# Patient Record
Sex: FEMALE | Race: WHITE | NOT HISPANIC OR LATINO | Employment: PART TIME | URBAN - NONMETROPOLITAN AREA
[De-identification: names, ages, dates, MRNs, and addresses within clinical notes are randomized per-mention and may not be internally consistent; named-entity substitution may affect disease eponyms.]

---

## 2017-03-03 ENCOUNTER — OFFICE VISIT (OUTPATIENT)
Dept: FAMILY MEDICINE CLINIC | Facility: CLINIC | Age: 37
End: 2017-03-03

## 2017-03-03 VITALS
WEIGHT: 163 LBS | SYSTOLIC BLOOD PRESSURE: 121 MMHG | TEMPERATURE: 98.5 F | DIASTOLIC BLOOD PRESSURE: 70 MMHG | BODY MASS INDEX: 26.2 KG/M2 | OXYGEN SATURATION: 98 % | HEIGHT: 66 IN | HEART RATE: 78 BPM

## 2017-03-03 DIAGNOSIS — F17.200 TOBACCO USE DISORDER: ICD-10-CM

## 2017-03-03 DIAGNOSIS — K58.0 IRRITABLE BOWEL SYNDROME WITH DIARRHEA: ICD-10-CM

## 2017-03-03 DIAGNOSIS — K21.9 GASTROESOPHAGEAL REFLUX DISEASE WITHOUT ESOPHAGITIS: Primary | ICD-10-CM

## 2017-03-03 PROCEDURE — 99214 OFFICE O/P EST MOD 30 MIN: CPT | Performed by: FAMILY MEDICINE

## 2017-03-03 PROCEDURE — 99406 BEHAV CHNG SMOKING 3-10 MIN: CPT | Performed by: FAMILY MEDICINE

## 2017-03-03 RX ORDER — PANTOPRAZOLE SODIUM 40 MG/1
40 TABLET, DELAYED RELEASE ORAL DAILY
Qty: 30 TABLET | Refills: 5 | Status: SHIPPED | OUTPATIENT
Start: 2017-03-03 | End: 2017-04-21 | Stop reason: SDUPTHER

## 2017-03-03 RX ORDER — BUPROPION HYDROCHLORIDE 100 MG/1
100 TABLET, EXTENDED RELEASE ORAL 2 TIMES DAILY
Qty: 60 TABLET | Refills: 3 | Status: SHIPPED | OUTPATIENT
Start: 2017-03-03 | End: 2017-04-21 | Stop reason: SDUPTHER

## 2017-03-03 NOTE — PROGRESS NOTES
Subjective   Vielka Jennings is a 36 y.o. female.     Chief Complaint   Patient presents with   • Follow-up     med refills       History of Present Illness   Pt here for sched f/u of TYRA/irritable bowel syndrome; GERD symptoms well controlled; pt states some inc freq of anxiety spells, situational; no dysphagia; no BRB/BTS; good uop.  The following portions of the patient's history were reviewed and updated as appropriate: allergies, current medications, past family history, past medical history, past social history, past surgical history and problem list.    Review of Systems   Constitutional: Negative for activity change, appetite change, chills, diaphoresis, fatigue, fever and unexpected weight change.   HENT: Negative for congestion, dental problem, drooling, ear discharge, ear pain, facial swelling, hearing loss, mouth sores, nosebleeds, postnasal drip, rhinorrhea, sinus pressure, sneezing, sore throat, tinnitus, trouble swallowing and voice change.    Eyes: Negative for photophobia, pain, discharge, redness, itching and visual disturbance.   Respiratory: Negative for apnea, cough, choking, chest tightness, shortness of breath, wheezing and stridor.    Cardiovascular: Negative for chest pain, palpitations and leg swelling.   Gastrointestinal: Negative for abdominal distention, abdominal pain, anal bleeding, blood in stool, constipation, diarrhea, nausea, rectal pain and vomiting.   Endocrine: Negative for cold intolerance, heat intolerance, polydipsia, polyphagia and polyuria.   Genitourinary: Negative for decreased urine volume, difficulty urinating, dysuria, enuresis, flank pain, frequency, genital sores, hematuria and urgency.   Musculoskeletal: Negative for arthralgias, back pain, gait problem, joint swelling, myalgias, neck pain and neck stiffness.   Skin: Negative for color change, pallor, rash and wound.   Allergic/Immunologic: Negative for food allergies and immunocompromised state.   Neurological:  "Negative for dizziness, tremors, seizures, syncope, facial asymmetry, speech difficulty, weakness, light-headedness, numbness and headaches.   Hematological: Negative for adenopathy. Does not bruise/bleed easily.   Psychiatric/Behavioral: Negative for agitation, behavioral problems, confusion, decreased concentration, dysphoric mood, hallucinations, self-injury, sleep disturbance and suicidal ideas. The patient is nervous/anxious. The patient is not hyperactive.        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     • PROAIR  (90 BASE) MCG/ACT inhaler   0   • [DISCONTINUED] pantoprazole (PROTONIX) 40 MG EC tablet Take 1 tablet by mouth Daily. 30 tablet 5   • [DISCONTINUED] sertraline (ZOLOFT) 50 MG tablet Take 1 tablet by mouth Daily. 30 tablet 5     No current facility-administered medications on file prior to visit.        Social History     Social History   • Marital status:      Spouse name: N/A   • Number of children: N/A   • Years of education: N/A     Occupational History   • Not on file.     Social History Main Topics   • Smoking status: Current Every Day Smoker   • Smokeless tobacco: Not on file   • Alcohol use No   • Drug use: No   • Sexual activity: Defer     Other Topics Concern   • Not on file     Social History Narrative       Objective   Blood pressure 121/70, pulse 78, temperature 98.5 °F (36.9 °C), height 66\" (167.6 cm), weight 163 lb (73.9 kg), SpO2 98 %.     Physical Exam   Constitutional: She is oriented to person, " place, and time. She appears well-developed and well-nourished. No distress.   HENT:   Head: Normocephalic and atraumatic.   Right Ear: External ear normal.   Left Ear: External ear normal.   Nose: Nose normal.   Mouth/Throat: Oropharynx is clear and moist. No oropharyngeal exudate.   Eyes: Conjunctivae and EOM are normal. Pupils are equal, round, and reactive to light. Right eye exhibits no discharge. Left eye exhibits no discharge. No scleral icterus.   Neck: Normal range of motion. Neck supple. No JVD present. No tracheal deviation present. No thyromegaly present.   Cardiovascular: Normal rate, normal heart sounds and intact distal pulses.  Exam reveals no gallop and no friction rub.    No murmur heard.  Pulmonary/Chest: Effort normal and breath sounds normal. No stridor. No respiratory distress. She has no wheezes. She has no rales. She exhibits no tenderness.   Abdominal: Soft. Bowel sounds are normal. She exhibits no distension and no mass. There is no tenderness. There is no rebound and no guarding. No hernia.   Genitourinary:   Genitourinary Comments: Pt defers   Musculoskeletal: Normal range of motion. She exhibits no edema, tenderness or deformity.   Lymphadenopathy:     She has no cervical adenopathy.   Neurological: She is alert and oriented to person, place, and time. She has normal reflexes. She displays normal reflexes. No cranial nerve deficit. She exhibits normal muscle tone. Coordination normal.   Skin: Skin is warm. No rash noted. She is not diaphoretic. No erythema. No pallor.   Chronic beefy red port wine stain to R scalp/ear/neck, involving R EAC, and R oral mucosa/post pharynx but not crossing midline.   Psychiatric: She has a normal mood and affect. Her behavior is normal. Judgment and thought content normal.   Nursing note and vitals reviewed.      Results for orders placed or performed during the hospital encounter of 06/10/16   TSH   Result Value Ref Range    TSH 1.012 0.350 - 5.350 UIU/mL    T4, free   Result Value Ref Range    Free T4 1.31 0.89 - 1.76 ng/dL   Lipoprotein NMR   Result Value Ref Range    Total Cholesterol 175 100 - 199 mg/dL    HDL-C 38 (L) >39 mg/dL    Triglycerides 63 0 - 149 mg/dL    LDL-P 1600 (H) <1000 nmol/L    HDL-P (Total) 30.2 (L) >=30.5 umol/L    Small LDL-P 881 (H) <=527 nmol/L    LDL Size 20.4 >20.5 nm    LP-IR Score** 53 (H) <=45    LDL Cholesterol  124 (H) 0 - 99 mg/dL       Assessment/Plan   Problems Addressed this Visit        Digestive    Gastroesophageal reflux disease without esophagitis - Primary    Relevant Medications    pantoprazole (PROTONIX) 40 MG EC tablet    Irritable bowel syndrome with diarrhea    Relevant Medications    buPROPion SR (WELLBUTRIN SR) 100 MG 12 hr tablet       Other    Tobacco use disorder    Relevant Medications    buPROPion SR (WELLBUTRIN SR) 100 MG 12 hr tablet               Discussion/Summary:Discussed plan of care in detail with pt today; pt verb understanding and agrees; counseled for approx 15 min of total 25 min exam time.    Discussed need for stress/anxiety reducing techniques such as prayer/meditation/breathing and counting exercises and avoidance of stress producing environments/situations; will follow clinically; consider anxiolytic med for prn use; will start with hydroxyzine and follow clinically.    Anti - reflux measures, trigger foods and drinks to avoid: Fatty foods, alcohol, chocolate, coffee, tea, caffeinated soft drinks (decaffeinated coffee still has some caffeine), peppermint and spearmint, spices and vinegar, citrus fruits and juices, tomatoes and tomato sauces, and smoking. Other antireflux measures include weight reduction if overweight, avoid tight clothing around the abdomen, elevate the head of her bed 6 inches (May use a bed wedge which is placed between the mattress in box [place]) or blocks under the head of the bed, don't drink or eat for 2 hours before going to bed and avoid lying down immediately after  meals.    Counseled on numerous health conseq of tob use for a total of 8 minutes of exam time; pt verb understanding and agrees with need for cessation; pt chooses gradual cessation; instructed pt to use meditation/exercise/prayer or any other stress reducing activities to assist; will follow clinically and assist pt as needed; will change mood stabilizer to wellbutrin and follow clinically; stop zoloft.    There are no Patient Instructions on file for this visit.

## 2017-03-13 ENCOUNTER — TELEPHONE (OUTPATIENT)
Dept: OBSTETRICS AND GYNECOLOGY | Facility: CLINIC | Age: 37
End: 2017-03-13

## 2017-03-13 RX ORDER — LEVONORGESTREL AND ETHINYL ESTRADIOL 100-20(84)
1 KIT ORAL DAILY
Qty: 91 TABLET | Refills: 0 | Status: SHIPPED | OUTPATIENT
Start: 2017-03-13 | End: 2017-06-12

## 2017-03-13 NOTE — TELEPHONE ENCOUNTER
----- Message from Bria Shen sent at 3/13/2017 10:27 AM EDT -----  Contact: pt  This is Ora's pt.  She is scheduled for annual in April, but needs 1 refill of LoSeasonique sent to [facility] in [place].  thanks

## 2017-04-21 ENCOUNTER — TELEPHONE (OUTPATIENT)
Dept: FAMILY MEDICINE CLINIC | Facility: CLINIC | Age: 37
End: 2017-04-21

## 2017-04-21 DIAGNOSIS — K58.0 IRRITABLE BOWEL SYNDROME WITH DIARRHEA: ICD-10-CM

## 2017-04-21 DIAGNOSIS — F17.200 TOBACCO USE DISORDER: ICD-10-CM

## 2017-04-21 DIAGNOSIS — K21.9 GASTROESOPHAGEAL REFLUX DISEASE WITHOUT ESOPHAGITIS: ICD-10-CM

## 2017-04-21 RX ORDER — PANTOPRAZOLE SODIUM 40 MG/1
40 TABLET, DELAYED RELEASE ORAL DAILY
Qty: 30 TABLET | Refills: 5 | Status: SHIPPED | OUTPATIENT
Start: 2017-04-21 | End: 2018-02-23 | Stop reason: SDDI

## 2017-04-21 RX ORDER — BUPROPION HYDROCHLORIDE 100 MG/1
100 TABLET, EXTENDED RELEASE ORAL 2 TIMES DAILY
Qty: 60 TABLET | Refills: 3 | Status: SHIPPED | OUTPATIENT
Start: 2017-04-21 | End: 2018-02-23 | Stop reason: SINTOL

## 2017-04-21 NOTE — TELEPHONE ENCOUNTER
"----- Message from Tess Montenegro sent at 4/21/2017  8:58 AM EDT -----  Contact: ROD   Pt came into the office for an appointment with dr melissa. i advised pt that he is no longer in the practice and that we have tried to contact her regarding appt. Pt began to be upset stating \"if we would have called she would have answered.\" i verified phone number with pt which was correct. She stated \" it doesn't matter if she says we didn't call her its only whats documented that is correct\" i advised pt i could try to get her in with another provider if she needed to be seen and that she could have a seat. She then stated again \" if you all had called me i would not have gotten my daughter out since she just had surgery\" then len came up to speak with patient.   "

## 2017-05-25 ENCOUNTER — OFFICE VISIT (OUTPATIENT)
Dept: OBSTETRICS AND GYNECOLOGY | Facility: CLINIC | Age: 37
End: 2017-05-25

## 2017-05-25 VITALS
SYSTOLIC BLOOD PRESSURE: 122 MMHG | BODY MASS INDEX: 26.84 KG/M2 | HEIGHT: 66 IN | WEIGHT: 167 LBS | DIASTOLIC BLOOD PRESSURE: 80 MMHG

## 2017-05-25 DIAGNOSIS — Z12.4 SCREENING FOR MALIGNANT NEOPLASM OF CERVIX: ICD-10-CM

## 2017-05-25 DIAGNOSIS — Z01.419 ENCOUNTER FOR GYNECOLOGICAL EXAMINATION WITHOUT ABNORMAL FINDING: Primary | ICD-10-CM

## 2017-05-25 PROCEDURE — 99395 PREV VISIT EST AGE 18-39: CPT | Performed by: PHYSICIAN ASSISTANT

## 2017-05-25 RX ORDER — LEVONORGESTREL AND ETHINYL ESTRADIOL 100-20(84)
1 KIT ORAL DAILY
Qty: 91 TABLET | Refills: 3 | Status: SHIPPED | OUTPATIENT
Start: 2017-05-25 | End: 2018-02-23 | Stop reason: SDUPTHER

## 2017-06-08 DIAGNOSIS — Z01.419 ENCOUNTER FOR GYNECOLOGICAL EXAMINATION WITHOUT ABNORMAL FINDING: ICD-10-CM

## 2017-12-01 ENCOUNTER — OFFICE VISIT (OUTPATIENT)
Dept: OBSTETRICS AND GYNECOLOGY | Facility: CLINIC | Age: 37
End: 2017-12-01

## 2017-12-01 VITALS
HEIGHT: 66 IN | DIASTOLIC BLOOD PRESSURE: 78 MMHG | SYSTOLIC BLOOD PRESSURE: 126 MMHG | BODY MASS INDEX: 27.97 KG/M2 | WEIGHT: 174 LBS

## 2017-12-01 DIAGNOSIS — R87.610 ATYPICAL SQUAMOUS CELL CHANGES OF UNDETERMINED SIGNIFICANCE (ASCUS) ON CERVICAL CYTOLOGY WITH POSITIVE HIGH RISK HUMAN PAPILLOMA VIRUS (HPV): Primary | ICD-10-CM

## 2017-12-01 DIAGNOSIS — R87.810 ATYPICAL SQUAMOUS CELL CHANGES OF UNDETERMINED SIGNIFICANCE (ASCUS) ON CERVICAL CYTOLOGY WITH POSITIVE HIGH RISK HUMAN PAPILLOMA VIRUS (HPV): Primary | ICD-10-CM

## 2017-12-01 PROCEDURE — 99213 OFFICE O/P EST LOW 20 MIN: CPT | Performed by: PHYSICIAN ASSISTANT

## 2017-12-01 NOTE — PROGRESS NOTES
Subjective   Chief Complaint   Patient presents with   • Abnormal Pap Smear     Pt here for repeat pap due to previous abnormal pap.        Vielka Jennings is a 37 y.o. 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 2016   • Heart murmur    • IBS (irritable bowel syndrome)    • Irritable bowel syndrome with diarrhea 6/3/2016        Current Outpatient Prescriptions:   •  Levonorgest-Eth Estrad 91-Day (CAMRESE LO) 0.1-0.02 & 0.01 MG tablet, Take 1 tablet by mouth Daily., Disp: 91 tablet, Rfl: 3  •  buPROPion SR (WELLBUTRIN SR) 100 MG 12 hr tablet, Take 1 tablet by mouth 2 (Two) Times a Day., Disp: 60 tablet, Rfl: 3  •  pantoprazole (PROTONIX) 40 MG EC tablet, Take 1 tablet by mouth Daily., Disp: 30 tablet, Rfl: 5  •  PROAIR  (90 BASE) MCG/ACT inhaler, , Disp: , Rfl: 0   Allergies   Allergen Reactions   • Levaquin [Levofloxacin]    • Sulfa Antibiotics    • Zithromax [Azithromycin]       Past Surgical History:   Procedure Laterality Date   • ANKLE SURGERY     • CHOLECYSTECTOMY     • DILATATION AND CURETTAGE        Social History     Social History   • Marital status:      Spouse name: N/A   • Number of children: N/A   • Years of education: N/A     Occupational History   • Not on file.     Social History Main Topics   • Smoking status: Current Every Day Smoker   • Smokeless tobacco: Never Used   • Alcohol use No   • Drug use: No   • Sexual activity: Defer     Other Topics Concern   • Not on file     Social History Narrative      Family History   Problem Relation Age of Onset   • Cancer Mother    • Hyperlipidemia Mother    • Melanoma Mother    • Hyperlipidemia Father    • Hypertension Father    •  "Hyperlipidemia Brother    • Hypertension Brother        Review of Systems        Objective   /78  Ht 66\" (167.6 cm)  Wt 174 lb (78.9 kg)  BMI 28.08 kg/m2    Physical Exam   Constitutional: She appears well-developed and well-nourished. She is cooperative.   Abdominal: Soft. Normal appearance. There is no tenderness.   Genitourinary: Vagina normal and uterus normal. There is no tenderness or lesion on the right labia. There is no tenderness or lesion on the left labia. Cervix exhibits no motion tenderness and no discharge. Right adnexum displays no mass and no tenderness. Left adnexum displays no mass and no tenderness.   Neurological: She is alert.   Skin: Skin is warm and dry.   Psychiatric: She has a normal mood and affect. Her behavior is normal.            Assessment and Plan  Vielka was seen today for abnormal pap smear.    Diagnoses and all orders for this visit:    Atypical squamous cell changes of undetermined significance (ASCUS) on cervical cytology with positive high risk human papilloma virus (HPV)  -     Liquid-based Pap Smear, Screening - ThinPrep Vial, Cervix      Patient Instructions   Will call with pap results             This note was electronically signed.    Sarah Biggs PA-C   December 1, 2017  "

## 2018-02-23 ENCOUNTER — OFFICE VISIT (OUTPATIENT)
Dept: FAMILY MEDICINE CLINIC | Facility: CLINIC | Age: 38
End: 2018-02-23

## 2018-02-23 VITALS
OXYGEN SATURATION: 99 % | BODY MASS INDEX: 27.64 KG/M2 | HEIGHT: 66 IN | HEART RATE: 86 BPM | DIASTOLIC BLOOD PRESSURE: 84 MMHG | TEMPERATURE: 99.2 F | SYSTOLIC BLOOD PRESSURE: 130 MMHG | WEIGHT: 172 LBS

## 2018-02-23 DIAGNOSIS — F17.200 TOBACCO DEPENDENCE: ICD-10-CM

## 2018-02-23 DIAGNOSIS — F41.9 ANXIETY: ICD-10-CM

## 2018-02-23 DIAGNOSIS — Z13.29 THYROID DISORDER SCREEN: ICD-10-CM

## 2018-02-23 DIAGNOSIS — E53.8 VITAMIN B 12 DEFICIENCY: ICD-10-CM

## 2018-02-23 DIAGNOSIS — R07.89 OTHER CHEST PAIN: ICD-10-CM

## 2018-02-23 DIAGNOSIS — R53.83 FATIGUE, UNSPECIFIED TYPE: ICD-10-CM

## 2018-02-23 DIAGNOSIS — H65.31 CHRONIC MUCOID OTITIS MEDIA OF RIGHT EAR: ICD-10-CM

## 2018-02-23 DIAGNOSIS — E55.9 VITAMIN D DEFICIENCY: ICD-10-CM

## 2018-02-23 DIAGNOSIS — H91.91 HEARING LOSS ASSOCIATED WITH SYNDROME OF RIGHT EAR: ICD-10-CM

## 2018-02-23 DIAGNOSIS — R00.2 INTERMITTENT PALPITATIONS: ICD-10-CM

## 2018-02-23 PROCEDURE — 99214 OFFICE O/P EST MOD 30 MIN: CPT | Performed by: NURSE PRACTITIONER

## 2018-02-23 PROCEDURE — 99406 BEHAV CHNG SMOKING 3-10 MIN: CPT | Performed by: NURSE PRACTITIONER

## 2018-02-23 PROCEDURE — 36415 COLL VENOUS BLD VENIPUNCTURE: CPT | Performed by: NURSE PRACTITIONER

## 2018-02-23 RX ORDER — LEVONORGESTREL AND ETHINYL ESTRADIOL 100-20(84)
KIT ORAL
COMMUNITY
End: 2018-05-08 | Stop reason: SDUPTHER

## 2018-02-23 RX ORDER — VENLAFAXINE HYDROCHLORIDE 37.5 MG/1
37.5 CAPSULE, EXTENDED RELEASE ORAL DAILY
Qty: 30 CAPSULE | Refills: 1 | Status: SHIPPED | OUTPATIENT
Start: 2018-02-23 | End: 2018-03-25

## 2018-02-23 RX ORDER — ALPRAZOLAM 0.5 MG/1
0.5 TABLET ORAL NIGHTLY PRN
Qty: 30 TABLET | Refills: 0 | Status: SHIPPED | OUTPATIENT
Start: 2018-02-23 | End: 2018-03-25

## 2018-02-23 NOTE — PROGRESS NOTES
Subjective   Vielka Jennings is a 37 y.o. female.     HPI Comments: Patient is a 37 year old female here today to establish care with a PCP. She has been having chest pain, palpitations and fatigue for the past 2 years. She states she was told 2 years ago, that it was thought to be anxiety causing her symptoms. She states she had an EKG and an ECHO, at [facility] and they were both normal. She states feels as well her symptoms could be anxiety related. She states she does not sleep well either, and feels fatigued a lot, so could be depressed as well. She has had anxiety in the past but has never taken anything for it.    She has history of IBS and GERD, but both are currently controlled with diet and prn meds. She states she has take Zoloft in the past, for her IBS, but it did not help anything so she stopped it.    She states she also has a history of chronic otitis media of her right ear, and hearing loss, which she follows ENT for and recently had a tube placed in the ear. It is felt that it is possible that her Port Wine stain, which extends down into her ear, is the cause of her chronic ear problems.    Social History    Marital status:          Number of children: 1              Occupational History    None on file    Social History Main Topics    Smoking status: Current Every Day Smoker                                                     Packs/day: 0.50      Years: 0.00           Types: Cigarettes    Smokeless status: Never Used                        Alcohol use: No              Drug use: No              Sexual activity: Defer                Other Topics            Concern    None on file    Social History Narrative    None on file    Review of patient's family history indicates:    Cancer                         Mother                    Hyperlipidemia                 Mother                    Melanoma                       Mother                    Hyperlipidemia                 Father                     Hypertension                   Father                    Hyperlipidemia                 Brother                   Hypertension                   Brother                   Thyroid disease                Maternal Aunt             Hyperlipidemia                 Maternal Grandmother      Hyperlipidemia                 Maternal Grandfather      Heart attack                   Paternal Grandmother      Hyperlipidemia                 Paternal Grandmother      Heart attack                   Paternal Grandfather      Hyperlipidemia                 Paternal Grandfather                 The following portions of the patient's history were reviewed and updated as appropriate: allergies, current medications, past family history, past medical history, past social history, past surgical history and problem list.    Review of Systems   Constitutional: Positive for fatigue. Negative for activity change, appetite change, chills, diaphoresis, fever and unexpected weight change.   HENT: Positive for ear pain and hearing loss. Negative for congestion, dental problem, ear discharge, postnasal drip, rhinorrhea, sinus pain, sinus pressure, sneezing, sore throat, tinnitus and trouble swallowing.    Eyes: Negative.    Respiratory: Negative for apnea, cough, chest tightness, shortness of breath and wheezing.    Cardiovascular: Positive for chest pain and palpitations. Negative for leg swelling.   Gastrointestinal: Negative.         Constipation or diarrhea at times, r/t IBS   Endocrine: Negative.    Genitourinary: Negative.    Musculoskeletal: Negative.    Skin: Negative.    Allergic/Immunologic: Negative.    Neurological: Negative.    Hematological: Negative.    Psychiatric/Behavioral: Negative for agitation, behavioral problems, confusion, decreased concentration, dysphoric mood, hallucinations, self-injury, sleep disturbance and suicidal ideas. The patient is nervous/anxious. The patient is not hyperactive.      Blood  "pressure 130/84, pulse 86, temperature 99.2 °F (37.3 °C), height 167.6 cm (66\"), weight 78 kg (172 lb), last menstrual period 01/01/2015, SpO2 99 %.  Objective   Physical Exam   Constitutional: She is oriented to person, place, and time. She appears well-developed and well-nourished. No distress.   HENT:   Head: Normocephalic.   Right Ear: External ear normal.   Left Ear: External ear normal.   Nose: Nose normal.   Mouth/Throat: Oropharynx is clear and moist. No oropharyngeal exudate.   Tympanostomy tube noted in right ear   Eyes: Conjunctivae are normal.   Neck: Normal range of motion. Neck supple. No tracheal deviation present. No thyromegaly present.   Cardiovascular: Normal rate, regular rhythm, normal heart sounds and intact distal pulses.    No murmur heard.  Pulmonary/Chest: Effort normal and breath sounds normal. No respiratory distress. She has no wheezes. She has no rales. She exhibits no tenderness.   Abdominal: Soft. Bowel sounds are normal. She exhibits no distension and no mass. There is no hepatosplenomegaly or splenomegaly. There is no tenderness. There is no rebound and no guarding. No hernia.   Musculoskeletal: Normal range of motion. She exhibits no edema or tenderness.   Lymphadenopathy:     She has no cervical adenopathy.        Right cervical: No superficial cervical, no deep cervical and no posterior cervical adenopathy present.       Left cervical: No superficial cervical, no deep cervical and no posterior cervical adenopathy present.   Neurological: She is alert and oriented to person, place, and time. Coordination and gait normal.   Skin: Skin is warm and dry. No rash noted.        Psychiatric: She has a normal mood and affect. Her behavior is normal. Judgment and thought content normal.   Nursing note and vitals reviewed.      Assessment/Plan   Vielka was seen today for establish care, fatigue and anxiety.    Diagnoses and all orders for this visit:    Other chest pain    Intermittent " palpitations    Fatigue, unspecified type  -     CBC (No Diff)  -     Comprehensive Metabolic Panel  -     TSH  -     T4, Free  -     Vitamin B12    Anxiety  -     venlafaxine XR (EFFEXOR XR) 37.5 MG 24 hr capsule; Take 1 capsule by mouth Daily for 30 days.  -     ALPRAZolam (XANAX) 0.5 MG tablet; Take 1 tablet by mouth At Night As Needed for Anxiety for up to 30 days.  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 Effexor and Xanax prescribed for treatment of her anxiety and accompanying symptoms. Risks, benefits, and potential side effects of current/new medications reviewed with patient.  Patient voiced understanding and wished to proceed with treatment. NADIA report reviewed and scanned into chart.  Last NADIA date 2/23/18.    Screening labs obtained in the clinic today, for further assessment of her symptoms. She does have history of Vitamin D and B 12 deficiency. I will contact patient regarding test results and provide instructions regarding any necessary changes in plan of care.    Patient advised to continue to follow with ENT as directed, for her chronic ear conditions.     Smoking cessation advised.  Pt voiced understanding of health risks of cont' smoking. Patient wishes to continue smoking at this time. 3 minutes spent discussing this.    Patient was encouraged to keep me informed of any acute changes, lack of improvement, or any new concerning symptoms.    Patient to RTC in 4 weeks for follow up.      New Medications Ordered This Visit   Medications   • Levonorgest-Eth Estrad  91-Day (AMETHIA LO) 0.1-0.02 & 0.01 MG tablet     Sig: Take  by mouth.   • venlafaxine XR (EFFEXOR XR) 37.5 MG 24 hr capsule     Sig: Take 1 capsule by mouth Daily for 30 days.     Dispense:  30 capsule     Refill:  1   • ALPRAZolam (XANAX) 0.5 MG tablet     Sig: Take 1 tablet by mouth At Night As Needed for Anxiety for up to 30 days.     Dispense:  30 tablet     Refill:  0

## 2018-02-24 LAB
25(OH)D3+25(OH)D2 SERPL-MCNC: 11.9 NG/ML (ref 30–100)
ALBUMIN SERPL-MCNC: 4.4 G/DL (ref 3.5–5.5)
ALBUMIN/GLOB SERPL: 1.7 {RATIO} (ref 1.2–2.2)
ALP SERPL-CCNC: 80 IU/L (ref 39–117)
ALT SERPL-CCNC: 12 IU/L (ref 0–32)
AST SERPL-CCNC: 14 IU/L (ref 0–40)
BILIRUB SERPL-MCNC: 0.2 MG/DL (ref 0–1.2)
BUN SERPL-MCNC: 9 MG/DL (ref 6–20)
BUN/CREAT SERPL: 12 (ref 9–23)
CALCIUM SERPL-MCNC: 9.7 MG/DL (ref 8.7–10.2)
CHLORIDE SERPL-SCNC: 102 MMOL/L (ref 96–106)
CO2 SERPL-SCNC: 24 MMOL/L (ref 18–29)
CREAT SERPL-MCNC: 0.75 MG/DL (ref 0.57–1)
ERYTHROCYTE [DISTWIDTH] IN BLOOD BY AUTOMATED COUNT: 14 % (ref 12.3–15.4)
GFR SERPLBLD CREATININE-BSD FMLA CKD-EPI: 103 ML/MIN/1.73
GFR SERPLBLD CREATININE-BSD FMLA CKD-EPI: 119 ML/MIN/1.73
GLOBULIN SER CALC-MCNC: 2.6 G/DL (ref 1.5–4.5)
GLUCOSE SERPL-MCNC: 84 MG/DL (ref 65–99)
HCT VFR BLD AUTO: 45.1 % (ref 34–46.6)
HGB BLD-MCNC: 15.4 G/DL (ref 11.1–15.9)
Lab: NORMAL
MCH RBC QN AUTO: 31 PG (ref 26.6–33)
MCHC RBC AUTO-ENTMCNC: 34.1 G/DL (ref 31.5–35.7)
MCV RBC AUTO: 91 FL (ref 79–97)
PLATELET # BLD AUTO: 345 X10E3/UL (ref 150–379)
POTASSIUM SERPL-SCNC: 4.3 MMOL/L (ref 3.5–5.2)
PROT SERPL-MCNC: 7 G/DL (ref 6–8.5)
RBC # BLD AUTO: 4.96 X10E6/UL (ref 3.77–5.28)
SODIUM SERPL-SCNC: 143 MMOL/L (ref 134–144)
T4 FREE SERPL-MCNC: 1.42 NG/DL (ref 0.82–1.77)
TSH SERPL DL<=0.005 MIU/L-ACNC: 2.28 UIU/ML (ref 0.45–4.5)
VIT B12 SERPL-MCNC: 386 PG/ML (ref 232–1245)
WBC # BLD AUTO: 9.3 X10E3/UL (ref 3.4–10.8)

## 2018-02-26 DIAGNOSIS — E55.9 VITAMIN D DEFICIENCY: Primary | ICD-10-CM

## 2018-02-26 RX ORDER — ERGOCALCIFEROL 1.25 MG/1
50000 CAPSULE ORAL WEEKLY
Qty: 8 CAPSULE | Refills: 0 | Status: SHIPPED | OUTPATIENT
Start: 2018-02-26 | End: 2018-04-17

## 2018-04-16 ENCOUNTER — TELEPHONE (OUTPATIENT)
Dept: FAMILY MEDICINE CLINIC | Facility: CLINIC | Age: 38
End: 2018-04-16

## 2018-04-16 NOTE — TELEPHONE ENCOUNTER
Pt had sent a my chart message that she had tried calling several times and could not get a return call and that she wanted her apt canceled.    Message was sent on 4/16. I called pt and apologized several times and stated that I didn't know she had tried to call because I didn't get a message from her on my voice mail and I asked her if there was anything else we could do and she said no that was fine.

## 2018-04-17 DIAGNOSIS — E55.9 VITAMIN D DEFICIENCY: ICD-10-CM

## 2018-04-17 RX ORDER — ERGOCALCIFEROL 1.25 MG/1
CAPSULE ORAL
Qty: 8 CAPSULE | Refills: 0 | OUTPATIENT
Start: 2018-04-17

## 2018-05-08 ENCOUNTER — TELEPHONE (OUTPATIENT)
Dept: OBSTETRICS AND GYNECOLOGY | Facility: CLINIC | Age: 38
End: 2018-05-08

## 2018-05-08 RX ORDER — LEVONORGESTREL AND ETHINYL ESTRADIOL 100-20(84)
1 KIT ORAL DAILY
Qty: 91 TABLET | Refills: 1 | Status: SHIPPED | OUTPATIENT
Start: 2018-05-08 | End: 2018-06-14 | Stop reason: SDUPTHER

## 2018-05-08 NOTE — TELEPHONE ENCOUNTER
----- Message from Filippo Vaca sent at 5/8/2018  8:51 AM EDT -----  Contact: patient  Patient is requesting a refill on her birth control.  She is scheduled 06/14/2018 for her annual.       [facility] [place]

## 2018-06-14 ENCOUNTER — OFFICE VISIT (OUTPATIENT)
Dept: OBSTETRICS AND GYNECOLOGY | Facility: CLINIC | Age: 38
End: 2018-06-14

## 2018-06-14 VITALS
DIASTOLIC BLOOD PRESSURE: 70 MMHG | HEIGHT: 66 IN | WEIGHT: 170.4 LBS | SYSTOLIC BLOOD PRESSURE: 124 MMHG | BODY MASS INDEX: 27.38 KG/M2

## 2018-06-14 DIAGNOSIS — Z87.42 HISTORY OF ABNORMAL CERVICAL PAP SMEAR: ICD-10-CM

## 2018-06-14 DIAGNOSIS — Z12.39 SCREENING FOR BREAST CANCER: ICD-10-CM

## 2018-06-14 DIAGNOSIS — Z01.419 ENCOUNTER FOR GYNECOLOGICAL EXAMINATION WITHOUT ABNORMAL FINDING: Primary | ICD-10-CM

## 2018-06-14 DIAGNOSIS — Z12.4 SCREENING FOR MALIGNANT NEOPLASM OF CERVIX: ICD-10-CM

## 2018-06-14 PROCEDURE — 99395 PREV VISIT EST AGE 18-39: CPT | Performed by: PHYSICIAN ASSISTANT

## 2018-06-14 RX ORDER — LEVONORGESTREL AND ETHINYL ESTRADIOL 100-20(84)
1 KIT ORAL DAILY
Qty: 91 TABLET | Refills: 3 | Status: SHIPPED | OUTPATIENT
Start: 2018-06-14 | End: 2019-08-22 | Stop reason: SDUPTHER

## 2018-06-14 NOTE — PROGRESS NOTES
Subjective   Chief Complaint   Patient presents with   • Gynecologic Exam     Hx of abnormal paps; last pap -LSIL       Vielka Jennings is a 38 y.o. year old  presenting to be seen for her annual gynecological exam.   She desires refills Amethia-lo  Usually does not have bleed with amethia-lo or very light bleed  Has history of ascus positive hi risk HPV pap . Colposcopy was done  confirming DAREN 1.  She has had 2 follow up paps with ASCUS positive hpv May 2017 then LSIL pap 2017  Has never had screening mammogram but would like to do. 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   • Heart murmur    • IBS (irritable bowel syndrome)    • Irritable bowel syndrome with diarrhea 6/3/2016   • Port-wine stain         Current Outpatient Prescriptions:   •  Levonorgest-Eth Estrad 91-Day (AMETHIA LO) 0.1-0.02 & 0.01 MG tablet, Take 1 tablet by mouth Daily., Disp: 91 tablet, Rfl: 3   Allergies   Allergen Reactions   • Levaquin [Levofloxacin] Hives   • Sulfa Antibiotics Hives   • Zithromax [Azithromycin] Diarrhea      Past Surgical History:   Procedure Laterality Date   • ANKLE SURGERY     • CHOLECYSTECTOMY     • DILATATION AND CURETTAGE        Social History     Social History   • Marital status:      Spouse name: N/A   • Number of children: N/A   • Years of education: N/A     Occupational History   • Not on file.     Social History Main Topics   • Smoking status: Current Every Day Smoker     Packs/day: 0.50     Types: Cigarettes   • Smokeless tobacco: Never Used   • Alcohol use No   • Drug use: No   • Sexual activity: No     Other Topics Concern   • Not on file     Social History Narrative   • No narrative on file      Family History   Problem Relation Age of Onset   • Cancer Mother    • Hyperlipidemia Mother    • Melanoma Mother    •  "Hyperlipidemia Father    • Hypertension Father    • Hyperlipidemia Brother    • Hypertension Brother    • Thyroid disease Maternal Aunt    • Hyperlipidemia Maternal Grandmother    • Hyperlipidemia Maternal Grandfather    • Heart attack Paternal Grandmother    • Hyperlipidemia Paternal Grandmother    • Heart attack Paternal Grandfather    • Hyperlipidemia Paternal Grandfather        Review of Systems   Constitutional: Negative.    Gastrointestinal: Negative.    Genitourinary: Negative.            Objective   /70   Ht 167.6 cm (66\")   Wt 77.3 kg (170 lb 6.4 oz)   Breastfeeding? No   BMI 27.50 kg/m²     Physical Exam   Constitutional: She appears well-developed and well-nourished. She is cooperative.   Pulmonary/Chest: Right breast exhibits no inverted nipple, no mass, no nipple discharge, no skin change and no tenderness. Left breast exhibits no inverted nipple, no mass, no nipple discharge, no skin change and no tenderness.   Abdominal: Soft. Normal appearance. There is no tenderness.   Genitourinary: Vagina normal and uterus normal. There is no tenderness or lesion on the right labia. There is no tenderness or lesion on the left labia. Cervix exhibits no motion tenderness and no discharge. Right adnexum displays no mass and no tenderness. Left adnexum displays no mass and no tenderness.   Neurological: She is alert.   Skin: Skin is warm and dry.   Psychiatric: She has a normal mood and affect. Her behavior is normal.            Assessment and Plan  Vielka was seen today for gynecologic exam.    Diagnoses and all orders for this visit:    Encounter for gynecological examination without abnormal finding    Screening for malignant neoplasm of cervix  -     Liquid-based Pap Smear, Screening; Future    Screening for breast cancer  -     Mammo Screening Digital Tomosynthesis Bilateral With CAD; Future    History of abnormal cervical Pap smear    Other orders  -     Levonorgest-Eth Estrad 91-Day (AMETHIA LO) " 0.1-0.02 & 0.01 MG tablet; Take 1 tablet by mouth Daily.      Patient Instructions   Self breast exam monthly  Regular exercise  Mammogram every 2 years starting at age 40 then annual mammogram at age 50              This note was electronically signed.    Sarah Biggs PA-C   June 14, 2018

## 2018-06-27 DIAGNOSIS — Z12.4 SCREENING FOR MALIGNANT NEOPLASM OF CERVIX: ICD-10-CM

## 2018-06-29 ENCOUNTER — HOSPITAL ENCOUNTER (OUTPATIENT)
Dept: MAMMOGRAPHY | Facility: HOSPITAL | Age: 38
Discharge: HOME | End: 2018-06-29
Admitting: PHYSICIAN ASSISTANT

## 2018-06-29 DIAGNOSIS — Z12.39 SCREENING FOR BREAST CANCER: ICD-10-CM

## 2018-06-29 PROCEDURE — 77067 SCR MAMMO BI INCL CAD: CPT

## 2018-06-29 PROCEDURE — 77063 BREAST TOMOSYNTHESIS BI: CPT

## 2018-07-03 ENCOUNTER — TELEPHONE (OUTPATIENT)
Dept: OBSTETRICS AND GYNECOLOGY | Facility: CLINIC | Age: 38
End: 2018-07-03

## 2018-07-03 NOTE — TELEPHONE ENCOUNTER
----- Message from Vielka Jennings sent at 7/3/2018  2:57 PM EDT -----  Regarding: Test Results Question  Contact: [phone]  I had my pap on 6/14/2018.  Are the results back yet?

## 2018-07-03 NOTE — TELEPHONE ENCOUNTER
Please inform her it is still ASCUS with positive hi risk HPV and recommend colposcopy again as has been 2 years since previous colposcopy and persistently abnormal pap . Thanks

## 2018-07-19 ENCOUNTER — OFFICE VISIT (OUTPATIENT)
Dept: OBSTETRICS AND GYNECOLOGY | Facility: CLINIC | Age: 38
End: 2018-07-19

## 2018-07-19 VITALS
DIASTOLIC BLOOD PRESSURE: 78 MMHG | BODY MASS INDEX: 27.42 KG/M2 | WEIGHT: 170.6 LBS | HEIGHT: 66 IN | SYSTOLIC BLOOD PRESSURE: 130 MMHG

## 2018-07-19 DIAGNOSIS — R87.810 ATYPICAL SQUAMOUS CELL CHANGES OF UNDETERMINED SIGNIFICANCE (ASCUS) ON CERVICAL CYTOLOGY WITH POSITIVE HIGH RISK HUMAN PAPILLOMA VIRUS (HPV): Primary | ICD-10-CM

## 2018-07-19 DIAGNOSIS — R87.610 ATYPICAL SQUAMOUS CELL CHANGES OF UNDETERMINED SIGNIFICANCE (ASCUS) ON CERVICAL CYTOLOGY WITH POSITIVE HIGH RISK HUMAN PAPILLOMA VIRUS (HPV): Primary | ICD-10-CM

## 2018-07-19 PROCEDURE — 57454 BX/CURETT OF CERVIX W/SCOPE: CPT | Performed by: PHYSICIAN ASSISTANT

## 2018-07-19 NOTE — PATIENT INSTRUCTIONS
Remove tampon in 2 hours  No intercourse for 1 week   RTO if any heavy bleeding, pelvic pain, vaginal odor, fever or chills   Will call with biopsy results when available

## 2018-07-19 NOTE — PROGRESS NOTES
I have reviewed the notes, assessments, and/or procedures performed by   Oar Biggs, I concur with her/his documentation of Vielka Jennings.

## 2018-07-19 NOTE — PROGRESS NOTES
Colposcopy    Date of procedure:  7/19/2018    Risks and benefits discussed? yes  All questions answered? yes  Consents given by the patient  Written consent obtained? yes    Pre-op indication: ASCUS with POSITIVE high risk HPV. Persistent ASCUS pap alternating with LSIL pap. Had colposcopy and cervical biopsy 2016 for ASCUS positive hi risk HPV confirming DAREN l  Procedure documentation:    The cervix was bathed in acetic acid.   The findings were as follows:      The transformation zone was able to be seen adequately.    acetowhite noted at 12 oclock and 2 oclock    Ectocervical biopsies were taken from 12 o'clock and 2 oclock.  Monsels solution was applied to the biopsy sites..    An ECC was performed.      Colposcopic Impression: 1. Adequate colposcopy  2. Colposcopic findings are consistent with PAP       Plan: Will base further treatment on pathology results      This note was electronically signed.    Sarah Biggs PA-C  July 19, 2018

## 2018-07-25 DIAGNOSIS — R87.810 ATYPICAL SQUAMOUS CELL CHANGES OF UNDETERMINED SIGNIFICANCE (ASCUS) ON CERVICAL CYTOLOGY WITH POSITIVE HIGH RISK HUMAN PAPILLOMA VIRUS (HPV): ICD-10-CM

## 2018-07-25 DIAGNOSIS — R87.610 ATYPICAL SQUAMOUS CELL CHANGES OF UNDETERMINED SIGNIFICANCE (ASCUS) ON CERVICAL CYTOLOGY WITH POSITIVE HIGH RISK HUMAN PAPILLOMA VIRUS (HPV): ICD-10-CM

## 2018-07-31 ENCOUNTER — TELEPHONE (OUTPATIENT)
Dept: OBSTETRICS AND GYNECOLOGY | Facility: CLINIC | Age: 38
End: 2018-07-31

## 2018-07-31 NOTE — TELEPHONE ENCOUNTER
----- Message from Bria Shen sent at 7/31/2018 11:35 AM EDT -----  Contact: PT  PT IS SCHEDULED FOR LEEP IN OFFICE WITH DR MARTINEZ ON 8/24/18.  SHE CALLED TODAY WANTING TO HAVE IT DONE IN THE OR INSTEAD.  THANKS

## 2018-08-03 ENCOUNTER — PREP FOR SURGERY (OUTPATIENT)
Dept: OTHER | Facility: HOSPITAL | Age: 38
End: 2018-08-03

## 2018-08-03 DIAGNOSIS — N87.9 CERVICAL DYSPLASIA: Primary | ICD-10-CM

## 2018-08-03 RX ORDER — SODIUM CHLORIDE 0.9 % (FLUSH) 0.9 %
1-10 SYRINGE (ML) INJECTION AS NEEDED
Status: CANCELLED | OUTPATIENT
Start: 2018-08-03

## 2018-08-17 ENCOUNTER — APPOINTMENT (OUTPATIENT)
Dept: PREADMISSION TESTING | Facility: HOSPITAL | Age: 38
End: 2018-08-17

## 2018-08-17 VITALS — BODY MASS INDEX: 26.84 KG/M2 | HEIGHT: 66 IN | WEIGHT: 167 LBS

## 2018-08-17 DIAGNOSIS — N87.9 CERVICAL DYSPLASIA: ICD-10-CM

## 2018-08-17 LAB
ANION GAP SERPL CALCULATED.3IONS-SCNC: 12.6 MMOL/L (ref 10–20)
BASOPHILS # BLD AUTO: 0.06 10*3/MM3 (ref 0–0.2)
BASOPHILS NFR BLD AUTO: 0.7 % (ref 0–2.5)
BUN BLD-MCNC: 10 MG/DL (ref 7–20)
BUN/CREAT SERPL: 14.3 (ref 7.1–23.5)
CALCIUM SPEC-SCNC: 9.1 MG/DL (ref 8.4–10.2)
CHLORIDE SERPL-SCNC: 106 MMOL/L (ref 98–107)
CO2 SERPL-SCNC: 25 MMOL/L (ref 26–30)
CREAT BLD-MCNC: 0.7 MG/DL (ref 0.6–1.3)
DEPRECATED RDW RBC AUTO: 43.5 FL (ref 37–54)
EOSINOPHIL # BLD AUTO: 0.01 10*3/MM3 (ref 0–0.7)
EOSINOPHIL NFR BLD AUTO: 0.1 % (ref 0–7)
ERYTHROCYTE [DISTWIDTH] IN BLOOD BY AUTOMATED COUNT: 13 % (ref 11.5–14.5)
GFR SERPL CREATININE-BSD FRML MDRD: 94 ML/MIN/1.73
GLUCOSE BLD-MCNC: 77 MG/DL (ref 74–98)
HCT VFR BLD AUTO: 43.6 % (ref 37–47)
HGB BLD-MCNC: 14.5 G/DL (ref 12–16)
IMM GRANULOCYTES # BLD: 0.02 10*3/MM3 (ref 0–0.06)
IMM GRANULOCYTES NFR BLD: 0.2 % (ref 0–0.6)
LYMPHOCYTES # BLD AUTO: 2.27 10*3/MM3 (ref 0.6–3.4)
LYMPHOCYTES NFR BLD AUTO: 27 % (ref 10–50)
MCH RBC QN AUTO: 30.3 PG (ref 27–31)
MCHC RBC AUTO-ENTMCNC: 33.3 G/DL (ref 30–37)
MCV RBC AUTO: 91 FL (ref 81–99)
MONOCYTES # BLD AUTO: 0.47 10*3/MM3 (ref 0–0.9)
MONOCYTES NFR BLD AUTO: 5.6 % (ref 0–12)
NEUTROPHILS # BLD AUTO: 5.58 10*3/MM3 (ref 2–6.9)
NEUTROPHILS NFR BLD AUTO: 66.4 % (ref 37–80)
NRBC BLD MANUAL-RTO: 0 /100 WBC (ref 0–0)
PLATELET # BLD AUTO: 316 10*3/MM3 (ref 130–400)
PMV BLD AUTO: 10.6 FL (ref 6–12)
POTASSIUM BLD-SCNC: 3.6 MMOL/L (ref 3.5–5.1)
RBC # BLD AUTO: 4.79 10*6/MM3 (ref 4.2–5.4)
SODIUM BLD-SCNC: 140 MMOL/L (ref 137–145)
WBC NRBC COR # BLD: 8.41 10*3/MM3 (ref 4.8–10.8)

## 2018-08-17 PROCEDURE — 80048 BASIC METABOLIC PNL TOTAL CA: CPT | Performed by: OBSTETRICS & GYNECOLOGY

## 2018-08-17 PROCEDURE — 36415 COLL VENOUS BLD VENIPUNCTURE: CPT

## 2018-08-17 PROCEDURE — 85025 COMPLETE CBC W/AUTO DIFF WBC: CPT | Performed by: OBSTETRICS & GYNECOLOGY

## 2018-08-17 RX ORDER — CALCIUM CARBONATE 200(500)MG
1 TABLET,CHEWABLE ORAL DAILY PRN
COMMUNITY
End: 2020-08-25

## 2018-08-17 NOTE — DISCHARGE INSTRUCTIONS

## 2018-08-21 LAB
BACTERIA UR QL AUTO: ABNORMAL /HPF
BILIRUB UR QL STRIP: NEGATIVE
CLARITY UR: CLEAR
COLOR UR: YELLOW
GLUCOSE UR STRIP-MCNC: NEGATIVE MG/DL
HGB UR QL STRIP.AUTO: ABNORMAL
HYALINE CASTS UR QL AUTO: ABNORMAL /LPF
KETONES UR QL STRIP: NEGATIVE
LEUKOCYTE ESTERASE UR QL STRIP.AUTO: ABNORMAL
NITRITE UR QL STRIP: NEGATIVE
PH UR STRIP.AUTO: 6 [PH] (ref 5–8)
PROT UR QL STRIP: NEGATIVE
RBC # UR: ABNORMAL /HPF
REF LAB TEST METHOD: ABNORMAL
SP GR UR STRIP: 1.01 (ref 1–1.03)
SQUAMOUS #/AREA URNS HPF: ABNORMAL /HPF
UROBILINOGEN UR QL STRIP: ABNORMAL
WBC UR QL AUTO: ABNORMAL /HPF

## 2018-08-21 PROCEDURE — 87086 URINE CULTURE/COLONY COUNT: CPT | Performed by: OBSTETRICS & GYNECOLOGY

## 2018-08-21 PROCEDURE — 81001 URINALYSIS AUTO W/SCOPE: CPT | Performed by: OBSTETRICS & GYNECOLOGY

## 2018-08-22 PROCEDURE — S0260 H&P FOR SURGERY: HCPCS | Performed by: OBSTETRICS & GYNECOLOGY

## 2018-08-23 LAB — BACTERIA SPEC AEROBE CULT: NORMAL

## 2018-08-24 ENCOUNTER — ANESTHESIA EVENT (OUTPATIENT)
Dept: PERIOP | Facility: HOSPITAL | Age: 38
End: 2018-08-24

## 2018-08-24 ENCOUNTER — HOSPITAL ENCOUNTER (OUTPATIENT)
Facility: HOSPITAL | Age: 38
Setting detail: HOSPITAL OUTPATIENT SURGERY
Discharge: HOME | End: 2018-08-24
Attending: OBSTETRICS & GYNECOLOGY | Admitting: OBSTETRICS & GYNECOLOGY

## 2018-08-24 ENCOUNTER — ANESTHESIA (OUTPATIENT)
Dept: PERIOP | Facility: HOSPITAL | Age: 38
End: 2018-08-24

## 2018-08-24 VITALS
HEART RATE: 72 BPM | RESPIRATION RATE: 16 BRPM | DIASTOLIC BLOOD PRESSURE: 68 MMHG | TEMPERATURE: 98 F | SYSTOLIC BLOOD PRESSURE: 121 MMHG | OXYGEN SATURATION: 100 %

## 2018-08-24 DIAGNOSIS — N87.9 CERVICAL DYSPLASIA: ICD-10-CM

## 2018-08-24 PROCEDURE — 25010000002 PROPOFOL 10 MG/ML EMULSION: Performed by: NURSE ANESTHETIST, CERTIFIED REGISTERED

## 2018-08-24 PROCEDURE — 57522 CONIZATION OF CERVIX: CPT | Performed by: OBSTETRICS & GYNECOLOGY

## 2018-08-24 PROCEDURE — 25010000002 ONDANSETRON PER 1 MG: Performed by: NURSE ANESTHETIST, CERTIFIED REGISTERED

## 2018-08-24 PROCEDURE — 81025 URINE PREGNANCY TEST: CPT | Performed by: OBSTETRICS & GYNECOLOGY

## 2018-08-24 PROCEDURE — 25010000002 MIDAZOLAM PER 1 MG: Performed by: NURSE ANESTHETIST, CERTIFIED REGISTERED

## 2018-08-24 RX ORDER — HYDROCODONE BITARTRATE AND ACETAMINOPHEN 5; 325 MG/1; MG/1
2 TABLET ORAL EVERY 4 HOURS PRN
Status: CANCELLED | OUTPATIENT
Start: 2018-08-24 | End: 2018-09-03

## 2018-08-24 RX ORDER — ONDANSETRON 2 MG/ML
INJECTION INTRAMUSCULAR; INTRAVENOUS AS NEEDED
Status: DISCONTINUED | OUTPATIENT
Start: 2018-08-24 | End: 2018-08-24 | Stop reason: SURG

## 2018-08-24 RX ORDER — LIDOCAINE HYDROCHLORIDE 10 MG/ML
INJECTION, SOLUTION INFILTRATION; PERINEURAL AS NEEDED
Status: DISCONTINUED | OUTPATIENT
Start: 2018-08-24 | End: 2018-08-24 | Stop reason: HOSPADM

## 2018-08-24 RX ORDER — PROMETHAZINE HYDROCHLORIDE 25 MG/1
25 TABLET ORAL EVERY 6 HOURS PRN
Qty: 20 TABLET | Refills: 0 | Status: SHIPPED | OUTPATIENT
Start: 2018-08-24 | End: 2019-09-16

## 2018-08-24 RX ORDER — PROMETHAZINE HYDROCHLORIDE 25 MG/ML
12.5 INJECTION, SOLUTION INTRAMUSCULAR; INTRAVENOUS ONCE AS NEEDED
Status: DISCONTINUED | OUTPATIENT
Start: 2018-08-24 | End: 2018-08-24 | Stop reason: HOSPADM

## 2018-08-24 RX ORDER — MIDAZOLAM HYDROCHLORIDE 1 MG/ML
INJECTION INTRAMUSCULAR; INTRAVENOUS AS NEEDED
Status: DISCONTINUED | OUTPATIENT
Start: 2018-08-24 | End: 2018-08-24 | Stop reason: SURG

## 2018-08-24 RX ORDER — SODIUM CHLORIDE 9 MG/ML
INJECTION, SOLUTION INTRAVENOUS CONTINUOUS PRN
Status: DISCONTINUED | OUTPATIENT
Start: 2018-08-24 | End: 2018-08-24 | Stop reason: SURG

## 2018-08-24 RX ORDER — PROMETHAZINE HYDROCHLORIDE 12.5 MG/1
12.5 TABLET ORAL ONCE AS NEEDED
Status: DISCONTINUED | OUTPATIENT
Start: 2018-08-24 | End: 2018-08-24 | Stop reason: HOSPADM

## 2018-08-24 RX ORDER — ONDANSETRON 4 MG/1
4 TABLET, FILM COATED ORAL ONCE AS NEEDED
Status: DISCONTINUED | OUTPATIENT
Start: 2018-08-24 | End: 2018-08-24 | Stop reason: HOSPADM

## 2018-08-24 RX ORDER — ONDANSETRON 2 MG/ML
4 INJECTION INTRAMUSCULAR; INTRAVENOUS ONCE AS NEEDED
Status: DISCONTINUED | OUTPATIENT
Start: 2018-08-24 | End: 2018-08-24 | Stop reason: HOSPADM

## 2018-08-24 RX ORDER — SODIUM CHLORIDE 0.9 % (FLUSH) 0.9 %
1-10 SYRINGE (ML) INJECTION AS NEEDED
Status: DISCONTINUED | OUTPATIENT
Start: 2018-08-24 | End: 2018-08-24 | Stop reason: HOSPADM

## 2018-08-24 RX ORDER — FERRIC SUBSULFATE 0.21 G/G
LIQUID TOPICAL AS NEEDED
Status: DISCONTINUED | OUTPATIENT
Start: 2018-08-24 | End: 2018-08-24 | Stop reason: HOSPADM

## 2018-08-24 RX ORDER — HYDROCODONE BITARTRATE AND ACETAMINOPHEN 5; 325 MG/1; MG/1
1 TABLET ORAL EVERY 6 HOURS PRN
Qty: 12 TABLET | Refills: 0 | Status: SHIPPED | OUTPATIENT
Start: 2018-08-24 | End: 2019-09-16

## 2018-08-24 RX ORDER — SODIUM CHLORIDE, SODIUM LACTATE, POTASSIUM CHLORIDE, CALCIUM CHLORIDE 600; 310; 30; 20 MG/100ML; MG/100ML; MG/100ML; MG/100ML
80 INJECTION, SOLUTION INTRAVENOUS CONTINUOUS
Status: DISCONTINUED | OUTPATIENT
Start: 2018-08-24 | End: 2018-08-24 | Stop reason: HOSPADM

## 2018-08-24 RX ORDER — PROPOFOL 10 MG/ML
VIAL (ML) INTRAVENOUS AS NEEDED
Status: DISCONTINUED | OUTPATIENT
Start: 2018-08-24 | End: 2018-08-24 | Stop reason: SURG

## 2018-08-24 RX ORDER — METRONIDAZOLE 500 MG/1
500 TABLET ORAL 2 TIMES DAILY
Qty: 14 TABLET | Refills: 0 | Status: SHIPPED | OUTPATIENT
Start: 2018-08-24 | End: 2018-08-31

## 2018-08-24 RX ADMIN — PROPOFOL 50 MG: 10 INJECTION, EMULSION INTRAVENOUS at 11:35

## 2018-08-24 RX ADMIN — SODIUM CHLORIDE: 9 INJECTION, SOLUTION INTRAVENOUS at 11:45

## 2018-08-24 RX ADMIN — ONDANSETRON 4 MG: 2 INJECTION INTRAMUSCULAR; INTRAVENOUS at 11:27

## 2018-08-24 RX ADMIN — LIDOCAINE HYDROCHLORIDE 60 MG: 20 INJECTION, SOLUTION INTRAVENOUS at 11:27

## 2018-08-24 RX ADMIN — MIDAZOLAM HYDROCHLORIDE 2 MG: 1 INJECTION, SOLUTION INTRAMUSCULAR; INTRAVENOUS at 11:23

## 2018-08-24 RX ADMIN — PROPOFOL 100 MG: 10 INJECTION, EMULSION INTRAVENOUS at 11:47

## 2018-08-24 RX ADMIN — PROPOFOL 50 MG: 10 INJECTION, EMULSION INTRAVENOUS at 11:30

## 2018-08-24 RX ADMIN — PROPOFOL 50 MG: 10 INJECTION, EMULSION INTRAVENOUS at 11:25

## 2018-08-24 RX ADMIN — PROPOFOL 50 MG: 10 INJECTION, EMULSION INTRAVENOUS at 11:41

## 2018-08-24 RX ADMIN — SODIUM CHLORIDE, POTASSIUM CHLORIDE, SODIUM LACTATE AND CALCIUM CHLORIDE 80 ML/HR: 600; 310; 30; 20 INJECTION, SOLUTION INTRAVENOUS at 08:30

## 2018-08-24 NOTE — ANESTHESIA PREPROCEDURE EVALUATION
Anesthesia Evaluation     Patient summary reviewed and Nursing notes reviewed   no history of anesthetic complications:  NPO Solid Status: > 8 hours  NPO Liquid Status: > 8 hours           Airway   Mallampati: II  TM distance: >3 FB  Neck ROM: full  no difficulty expected  Dental - normal exam     Pulmonary - normal exam   (+) a smoker Current Abstained day of surgery,   Cardiovascular - normal exam    Rhythm: regular  Rate: normal    (+) valvular problems/murmurs murmur,       Neuro/Psych  (+) psychiatric history Anxiety and Depression,     GI/Hepatic/Renal/Endo    (+)  GERD,      Musculoskeletal (-) negative ROS    Abdominal    Substance History - negative use     OB/GYN negative ob/gyn ROS         Other - negative ROS                       Anesthesia Plan    ASA 2     MAC   (Pt told that intravenous sedation will be used as the primary anesthetic along with local anesthesia if necessary. Every effort will be made to make sure the patient is comfortable.     The patient was told they may or may not have recall for the procedure. It was further explained that if the MAC was not adequate that a general anesthetic with either an LMA or endotracheal tube would be required.     Will proceed with the plan of care.)  intravenous induction   Anesthetic plan and risks discussed with patient.

## 2018-08-24 NOTE — ANESTHESIA POSTPROCEDURE EVALUATION
Patient: Vielka Jennings    Procedure Summary     Date:  08/24/18 Room / Location:  [facility] OR 2 /  [facility]    Anesthesia Start:  1123 Anesthesia Stop:  1203    Procedure:  LOOP ELECTROCAUTERY EXCISION PROCEDURE (N/A Vagina) Diagnosis:       Cervical dysplasia      (Cervical dysplasia [N87.9])    Surgeon:  Candy Knight MD Provider:  Micheal Correa CRNA    Anesthesia Type:  MAC ASA Status:  2          Anesthesia Type: MAC  Last vitals  BP   130/55   Temp 97.9   Pulse   99   Resp   18   SpO2   100%     Post Anesthesia Care and Evaluation    Patient location during evaluation: PHASE II  Patient participation: complete - patient participated  Level of consciousness: awake and awake and alert  Pain management: satisfactory to patient  Airway patency: patent  Anesthetic complications: No anesthetic complications  PONV Status: none  Cardiovascular status: acceptable  Respiratory status: acceptable and room air  Hydration status: acceptable

## 2018-09-06 ENCOUNTER — OFFICE VISIT (OUTPATIENT)
Dept: OBSTETRICS AND GYNECOLOGY | Facility: CLINIC | Age: 38
End: 2018-09-06

## 2018-09-06 VITALS
SYSTOLIC BLOOD PRESSURE: 124 MMHG | DIASTOLIC BLOOD PRESSURE: 72 MMHG | HEIGHT: 66 IN | BODY MASS INDEX: 27.32 KG/M2 | WEIGHT: 170 LBS

## 2018-09-06 DIAGNOSIS — Z98.890 STATUS POST LEEP (LOOP ELECTROSURGICAL EXCISION PROCEDURE) OF CERVIX: ICD-10-CM

## 2018-09-06 DIAGNOSIS — Z09 POSTOP CHECK: Primary | ICD-10-CM

## 2018-09-06 DIAGNOSIS — N89.8 VAGINAL ITCHING: ICD-10-CM

## 2018-09-06 DIAGNOSIS — R39.89 POSSIBLE URINARY TRACT INFECTION: ICD-10-CM

## 2018-09-06 LAB
BILIRUB BLD-MCNC: NEGATIVE MG/DL
CLARITY, POC: CLEAR
COLOR UR: YELLOW
GLUCOSE UR STRIP-MCNC: NEGATIVE MG/DL
KETONES UR QL: NEGATIVE
LEUKOCYTE EST, POC: ABNORMAL
NITRITE UR-MCNC: NEGATIVE MG/ML
PH UR: 5.5 [PH] (ref 5–8)
PROT UR STRIP-MCNC: NEGATIVE MG/DL
RBC # UR STRIP: ABNORMAL /UL
SP GR UR: 1.02 (ref 1–1.03)
UROBILINOGEN UR QL: NORMAL

## 2018-09-06 PROCEDURE — 81003 URINALYSIS AUTO W/O SCOPE: CPT | Performed by: PHYSICIAN ASSISTANT

## 2018-09-06 PROCEDURE — 99024 POSTOP FOLLOW-UP VISIT: CPT | Performed by: PHYSICIAN ASSISTANT

## 2018-09-06 RX ORDER — NITROFURANTOIN 25; 75 MG/1; MG/1
CAPSULE ORAL
Refills: 0 | COMMUNITY
Start: 2018-09-01 | End: 2018-09-13 | Stop reason: SINTOL

## 2018-09-06 RX ORDER — PHENAZOPYRIDINE HYDROCHLORIDE 100 MG/1
TABLET, FILM COATED ORAL
Refills: 0 | COMMUNITY
Start: 2018-09-01 | End: 2019-09-16

## 2018-09-06 RX ORDER — FLUCONAZOLE 150 MG/1
TABLET ORAL
Qty: 2 TABLET | Refills: 0 | Status: SHIPPED | OUTPATIENT
Start: 2018-09-06 | End: 2018-09-13 | Stop reason: DRUGHIGH

## 2018-09-06 NOTE — PROGRESS NOTES
"Subjective   Chief Complaint   Patient presents with   • Post-op     two weeks post-op LEEP; being treated by Urgent Care for a UTI with Macrobid and still having symptoms       Vielka Jennings is a 38 y.o. year old  presenting to be seen for post op visit  She is 12 days post op LEEP  Pathology shows DAREN lll with close endocervical margin  Patient reports he is having some vaginal itching. Having light brown discharge. She was seen at urgent care 4 days ago with UTI symptoms of dysuria and started on macrobid. Reports symptoms are better but she has only been able to take macrobid once daily as it causes a lot of nausea  Has had normal bowel function. No fever or chills      She requests diflucan for yeast infection    Past Medical History:   Diagnosis Date   • Abnormal Pap smear of cervix    • Acid reflux    • Body piercing     EARS   • Cough     REPORTS SECONDARY TO SEASONAL ALLERGIES. REPORTS BEGAN AROUND 19. REPORTS NO FEVER OR PRODUCTION OF COUGH.   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 2016   • Heart murmur     REPORTS \"MURMUR AS A BABY\"   • IBS (irritable bowel syndrome)     REPORTS PAST HISTORY, NO CURRENT ISSUES   • Irritable bowel syndrome with diarrhea 6/3/2016   • Port-wine stain    • Seasonal allergies         Current Outpatient Prescriptions:   •  calcium carbonate (TUMS) 500 MG chewable tablet, Chew 1 tablet Daily As Needed for Indigestion or Heartburn., Disp: , Rfl:   •  Levonorgest-Eth Estrad -Day (AMETHIA LO) 0.1-0.02 & 0.01 MG tablet, Take 1 tablet by mouth Daily., Disp: 91 tablet, Rfl: 3  •  nitrofurantoin, macrocrystal-monohydrate, (MACROBID) 100 MG capsule, TK 1 C PO BID, Disp: , Rfl: 0  •  fluconazole (DIFLUCAN) 150 MG tablet, One po now and repeat in 3 days, Disp: 2 tablet, Rfl: 0  •  HYDROcodone-acetaminophen (NORCO) 5-325 MG per tablet, Take 1 tablet by mouth Every 6 (Six) Hours As Needed (pain) for up to 12 doses., Disp: 12 tablet, Rfl: 0  •  " "phenazopyridine (PYRIDIUM) 100 MG tablet, TK 1 T PO BID, Disp: , Rfl: 0  •  promethazine (PHENERGAN) 25 MG tablet, Take 1 tablet by mouth Every 6 (Six) Hours As Needed for Nausea or Vomiting for up to 20 doses., Disp: 20 tablet, Rfl: 0   Allergies   Allergen Reactions   • Levaquin [Levofloxacin] Hives   • Sulfa Antibiotics Hives   • Zithromax [Azithromycin] Diarrhea      Past Surgical History:   Procedure Laterality Date   • ANKLE SURGERY Left     REPORTS LIGAMENT REPAIR, THEN LATER HAD STITCHES REMOVED (1 YEAR POST OP)   • CHOLECYSTECTOMY     • DILATATION AND CURETTAGE     • ENDOSCOPY     • HYSTEROSCOPY ENDOMETRIAL ABLATION     • LEEP N/A 8/24/2018    Procedure: LOOP ELECTROCAUTERY EXCISION PROCEDURE;  Surgeon: Candy Knight MD;  Location: [facility];  Service: Obstetrics/Gynecology   • OTHER SURGICAL HISTORY      AFTER DELIVERY OF DAUGHTER REPORTS SURGICAL INTERVENTION FROM \"TEAR THAT WAS INTERNAL\"   • SKIN BIOPSY        Social History     Social History   • Marital status:      Spouse name: N/A   • Number of children: N/A   • Years of education: N/A     Occupational History   • Not on file.     Social History Main Topics   • Smoking status: Current Every Day Smoker     Packs/day: 0.50     Years: 18.00     Types: Cigarettes   • Smokeless tobacco: Never Used   • Alcohol use No   • Drug use: No   • Sexual activity: No     Other Topics Concern   • Not on file     Social History Narrative   • No narrative on file      Family History   Problem Relation Age of Onset   • Cancer Mother    • Hyperlipidemia Mother    • Melanoma Mother    • Hyperlipidemia Father    • Hypertension Father    • Hyperlipidemia Brother    • Hypertension Brother    • Thyroid disease Maternal Aunt    • Hyperlipidemia Maternal Grandmother    • Hyperlipidemia Maternal Grandfather    • Heart attack Paternal Grandmother    • Hyperlipidemia Paternal Grandmother    • Heart attack Paternal Grandfather    • Hyperlipidemia Paternal Grandfather  " "      Review of Systems   Constitutional: Negative.    Gastrointestinal: Negative.    Genitourinary: Positive for dysuria and vaginal discharge.           Objective   /72   Ht 167.6 cm (66\")   Wt 77.1 kg (170 lb)   LMP  (LMP Unknown) Comment: REPORTS LMP APPROXIMATELY 2016.  HISTORY OF UTERINE ABLATION.   Breastfeeding? No   BMI 27.44 kg/m²     Physical Exam         Assessment and Plan  Vielka was seen today for post-op.    Diagnoses and all orders for this visit:    Postop check    Status post LEEP (loop electrosurgical excision procedure) of cervix    Possible urinary tract infection  -     POC Urinalysis Dipstick, Automated    Vaginal itching    Other orders  -     fluconazole (DIFLUCAN) 150 MG tablet; One po now and repeat in 3 days      Patient Instructions   Follow up 3 months for pap and ECC             This note was electronically signed.    Sarah Biggs PA-C   September 6, 2018  "

## 2018-09-07 NOTE — PROGRESS NOTES
I have reviewed the notes, assessments, and/or procedures performed by Ora Biggs PA-C, I concur with her/his documentation of Vielka Jennings.

## 2018-09-13 ENCOUNTER — OFFICE VISIT (OUTPATIENT)
Dept: FAMILY MEDICINE CLINIC | Facility: CLINIC | Age: 38
End: 2018-09-13

## 2018-09-13 VITALS
SYSTOLIC BLOOD PRESSURE: 122 MMHG | BODY MASS INDEX: 27.48 KG/M2 | WEIGHT: 171 LBS | TEMPERATURE: 98.4 F | DIASTOLIC BLOOD PRESSURE: 80 MMHG | OXYGEN SATURATION: 98 % | HEIGHT: 66 IN | RESPIRATION RATE: 12 BRPM | HEART RATE: 94 BPM

## 2018-09-13 DIAGNOSIS — N30.00 ACUTE CYSTITIS WITHOUT HEMATURIA: ICD-10-CM

## 2018-09-13 DIAGNOSIS — E55.9 VITAMIN D DEFICIENCY: ICD-10-CM

## 2018-09-13 DIAGNOSIS — R30.0 DYSURIA: Primary | ICD-10-CM

## 2018-09-13 LAB
25(OH)D3+25(OH)D2 SERPL-MCNC: 24.3 NG/ML
ALBUMIN SERPL-MCNC: 4 G/DL (ref 3.5–5)
ALBUMIN/GLOB SERPL: 1.7 G/DL (ref 1–2)
ALP SERPL-CCNC: 68 U/L (ref 38–126)
ALT SERPL-CCNC: 28 U/L (ref 13–69)
AST SERPL-CCNC: 22 U/L (ref 15–46)
BILIRUB SERPL-MCNC: 0.4 MG/DL (ref 0.2–1.3)
BUN SERPL-MCNC: 8 MG/DL (ref 7–20)
BUN/CREAT SERPL: 11.4 (ref 7.1–23.5)
CALCIUM SERPL-MCNC: 9.5 MG/DL (ref 8.4–10.2)
CHLORIDE SERPL-SCNC: 105 MMOL/L (ref 98–107)
CO2 SERPL-SCNC: 28 MMOL/L (ref 26–30)
CREAT SERPL-MCNC: 0.7 MG/DL (ref 0.6–1.3)
GLOBULIN SER CALC-MCNC: 2.4 GM/DL
GLUCOSE SERPL-MCNC: 86 MG/DL (ref 74–98)
POTASSIUM SERPL-SCNC: 4.1 MMOL/L (ref 3.5–5.1)
PROT SERPL-MCNC: 6.4 G/DL (ref 6.3–8.2)
SODIUM SERPL-SCNC: 140 MMOL/L (ref 137–145)

## 2018-09-13 PROCEDURE — 99214 OFFICE O/P EST MOD 30 MIN: CPT | Performed by: FAMILY MEDICINE

## 2018-09-13 RX ORDER — FLUCONAZOLE 100 MG/1
100 TABLET ORAL DAILY
Qty: 3 TABLET | Refills: 0 | Status: SHIPPED | OUTPATIENT
Start: 2018-09-13 | End: 2018-09-16

## 2018-09-13 RX ORDER — CEFDINIR 300 MG/1
300 CAPSULE ORAL 2 TIMES DAILY WITH MEALS
Qty: 10 CAPSULE | Refills: 0 | Status: SHIPPED | OUTPATIENT
Start: 2018-09-13 | End: 2018-09-18

## 2018-09-13 NOTE — PROGRESS NOTES
Established Patient        Chief Complaint:   Chief Complaint   Patient presents with   • Establish Care     Re-establish care. Recent LEEP procedure and UTI        Vielka Jennings is a 38 y.o. female    History of Present Illness:     Patient is a 38-year-old female who is here for reestablishment with a primary care provider.  Patient had a recent LEEP procedure, without complications since.  Patient admits to increased frequency of urination with mild burning for approximately 5-7 days.  She denies any hematuria.  No reported fever or chills.  Denies any nausea or vomiting.  She has had no significant vaginal discharge.  No abnormal rashes.     Subjective     The following portions of the patient's history were reviewed and updated as appropriate: allergies, current medications, past family history, past medical history, past social history, past surgical history and problem list.    Allergies   Allergen Reactions   • Levaquin [Levofloxacin] Hives   • Sulfa Antibiotics Hives   • Zithromax [Azithromycin] Diarrhea       Review of Systems  1. Constitutional: Negative for fever. Negative for chills, diaphoresis, fatigue and unexpected weight change.   2. HENT: No dysphagia; no changes to vision/hearing/smell/taste; no epistaxis  3. Eyes: Negative for redness and visual disturbance.   4. Respiratory: negative for shortness of breath. Negative for chest pain . Negative for cough and chest tightness.   5. Cardiovascular: Negative for chest pain and palpitations.   6. Gastrointestinal: Negative for abdominal distention, abdominal pain and blood in stool.   7. Endocrine: Negative for cold intolerance and heat intolerance.   8. Genitourinary: Mild burning with urination, increased frequency.  No hematuria.  9. Musculoskeletal: Negative for arthralgias, back pain and myalgias.   10. Skin: Negative for color change, rash and wound.   11. Neurological: Negative for syncope, weakness and headaches.  "  12. Hematological: Negative for adenopathy. Does not bruise/bleed easily.   13. Psychiatric/Behavioral: Negative for confusion. The patient is not nervous/anxious.    Objective     Physical Exam   Vital Signs: /80   Pulse 94   Temp 98.4 °F (36.9 °C)   Resp 12   Ht 167.6 cm (66\")   Wt 77.6 kg (171 lb)   LMP  (LMP Unknown) Comment: REPORTS LMP APPROXIMATELY 2016.  HISTORY OF UTERINE ABLATION.   SpO2 98%   BMI 27.60 kg/m²     General Appearance: alert, oriented x 3, no acute distress.  Skin: warm and dry.  Known port wine stain to the right face area  HEENT: Atraumatic.  pupils round and reactive to light and accommodation, oral mucosa pink and moist.  Nares patent without epistaxis.  External auditory canals are patent tympanic membranes intact.  Neck: supple, no JVD, trachea midline.  No thyromegaly  Lungs: CTA, unlabored breathing effort.  Heart: RRR, normal S1 and S2, no S3, no rub.  Abdomen: soft, non-tender, no palpable bladder, present bowel sounds to auscultation ×4.  No guarding or rigidity.  Extremities: no clubbing, cyanosis or edema.  Good range of motion actively and passively.  Symmetric muscle strength and development  Neuro: normal speech and mental status.  Cranial nerves II through XII intact.  No anosmia. DTR 2+; proprioception intact.  No focal motor/sensory deficits.    Assessment and Plan      Assessment:   Vielka was seen today for establish care.    Diagnoses and all orders for this visit:    Dysuria  -     cefdinir (OMNICEF) 300 MG capsule; Take 1 capsule by mouth 2 (Two) Times a Day With Meals for 5 days.  -     fluconazole (DIFLUCAN) 100 MG tablet; Take 1 tablet by mouth Daily for 3 days.    Acute cystitis without hematuria  -     cefdinir (OMNICEF) 300 MG capsule; Take 1 capsule by mouth 2 (Two) Times a Day With Meals for 5 days.  -     fluconazole (DIFLUCAN) 100 MG tablet; Take 1 tablet by mouth Daily for 3 days.    Vitamin D deficiency  -     Comprehensive Metabolic Panel  -  "    Vitamin D 25 hydroxy        Plan:  Planned initiation of antibiotic therapy, fluconazole given for 3 day course.  Follow culture results, change to treatment regimen as needed.  Check CMP and vitamin D level, patient has not been on vitamin D supplementation for an extended period of time now.  Discussion Summary:    Discussed plan of care in detail with pt today; pt verb understanding and agrees; counseled for approx 15 min of total 25 min exam time.  Follow up:  Return in about 8 weeks (around 11/8/2018) for Recheck.     Patient Instructions   Vitamin D Deficiency  Vitamin D deficiency is when your body does not have enough vitamin D. Vitamin D is important to your body for many reasons:  · It helps the body to absorb two important minerals, called calcium and phosphorus.  · It plays a role in bone health.  · It may help to prevent some diseases, such as diabetes and multiple sclerosis.  · It plays a role in muscle function, including heart function.    You can get vitamin D by:  · Eating foods that naturally contain vitamin D.  · Eating or drinking milk or other dairy products that have vitamin D added to them.  · Taking a vitamin D supplement or a multivitamin supplement that contains vitamin D.  · Being in the sun. Your body naturally makes vitamin D when your skin is exposed to sunlight. Your body changes the sunlight into a form of the vitamin that the body can use.    If vitamin D deficiency is severe, it can cause a condition in which your bones become soft. In adults, this condition is called osteomalacia. In children, this condition is called rickets.  What are the causes?  Vitamin D deficiency may be caused by:  · Not eating enough foods that contain vitamin D.  · Not getting enough sun exposure.  · Having certain digestive system diseases that make it difficult for your body to absorb vitamin D. These diseases include Crohn disease, chronic pancreatitis, and cystic fibrosis.  · Having a surgery in  which a part of the stomach or a part of the small intestine is removed.  · Being obese.  · Having chronic kidney disease or liver disease.    What increases the risk?  This condition is more likely to develop in:  · Older people.  · People who do not spend much time outdoors.  · People who live in a long-term care facility.  · People who have had broken bones.  · People with weak or thin bones (osteoporosis).  · People who have a disease or condition that changes how the body absorbs vitamin D.  · People who have dark skin.  · People who take certain medicines, such as steroid medicines or certain seizure medicines.  · People who are overweight or obese.    What are the signs or symptoms?  In mild cases of vitamin D deficiency, there may not be any symptoms. If the condition is severe, symptoms may include:  · Bone pain.  · Muscle pain.  · Falling often.  · Broken bones caused by a minor injury.    How is this diagnosed?  This condition is usually diagnosed with a blood test.  How is this treated?  Treatment for this condition may depend on what caused the condition. Treatment options include:  · Taking vitamin D supplements.  · Taking a calcium supplement. Your health care provider will suggest what dose is best for you.    Follow these instructions at home:  · Take medicines and supplements only as told by your health care provider.  · Eat foods that contain vitamin D. Choices include:  ? Fortified dairy products, cereals, or juices. Fortified means that vitamin D has been added to the food. Check the label on the package to be sure.  ? Fatty fish, such as salmon or trout.  ? Eggs.  ? Oysters.  · Do not use a tanning bed.  · Maintain a healthy weight. Lose weight, if needed.  · Keep all follow-up visits as told by your health care provider. This is important.  Contact a health care provider if:  · Your symptoms do not go away.  · You feel like throwing up (nausea) or you throw up (vomit).  · You have fewer bowel  movements than usual or it is difficult for you to have a bowel movement (constipation).  This information is not intended to replace advice given to you by your health care provider. Make sure you discuss any questions you have with your health care provider.  Document Released: 03/11/2013 Document Revised: 05/31/2017 Document Reviewed: 05/04/2016  [employer] Patient Education © 2018 [employer]        Patricio Bass DO  09/13/18  8:57 AM    Please note that portions of this note may have been completed with a voice recognition program. Efforts were made to edit the dictations, but occasionally words are mistranscribed.

## 2018-09-13 NOTE — PATIENT INSTRUCTIONS
Vitamin D Deficiency  Vitamin D deficiency is when your body does not have enough vitamin D. Vitamin D is important to your body for many reasons:  · It helps the body to absorb two important minerals, called calcium and phosphorus.  · It plays a role in bone health.  · It may help to prevent some diseases, such as diabetes and multiple sclerosis.  · It plays a role in muscle function, including heart function.    You can get vitamin D by:  · Eating foods that naturally contain vitamin D.  · Eating or drinking milk or other dairy products that have vitamin D added to them.  · Taking a vitamin D supplement or a multivitamin supplement that contains vitamin D.  · Being in the sun. Your body naturally makes vitamin D when your skin is exposed to sunlight. Your body changes the sunlight into a form of the vitamin that the body can use.    If vitamin D deficiency is severe, it can cause a condition in which your bones become soft. In adults, this condition is called osteomalacia. In children, this condition is called rickets.  What are the causes?  Vitamin D deficiency may be caused by:  · Not eating enough foods that contain vitamin D.  · Not getting enough sun exposure.  · Having certain digestive system diseases that make it difficult for your body to absorb vitamin D. These diseases include Crohn disease, chronic pancreatitis, and cystic fibrosis.  · Having a surgery in which a part of the stomach or a part of the small intestine is removed.  · Being obese.  · Having chronic kidney disease or liver disease.    What increases the risk?  This condition is more likely to develop in:  · Older people.  · People who do not spend much time outdoors.  · People who live in a long-term care facility.  · People who have had broken bones.  · People with weak or thin bones (osteoporosis).  · People who have a disease or condition that changes how the body absorbs vitamin D.  · People who have dark skin.  · People who take certain  medicines, such as steroid medicines or certain seizure medicines.  · People who are overweight or obese.    What are the signs or symptoms?  In mild cases of vitamin D deficiency, there may not be any symptoms. If the condition is severe, symptoms may include:  · Bone pain.  · Muscle pain.  · Falling often.  · Broken bones caused by a minor injury.    How is this diagnosed?  This condition is usually diagnosed with a blood test.  How is this treated?  Treatment for this condition may depend on what caused the condition. Treatment options include:  · Taking vitamin D supplements.  · Taking a calcium supplement. Your health care provider will suggest what dose is best for you.    Follow these instructions at home:  · Take medicines and supplements only as told by your health care provider.  · Eat foods that contain vitamin D. Choices include:  ? Fortified dairy products, cereals, or juices. Fortified means that vitamin D has been added to the food. Check the label on the package to be sure.  ? Fatty fish, such as salmon or trout.  ? Eggs.  ? Oysters.  · Do not use a tanning bed.  · Maintain a healthy weight. Lose weight, if needed.  · Keep all follow-up visits as told by your health care provider. This is important.  Contact a health care provider if:  · Your symptoms do not go away.  · You feel like throwing up (nausea) or you throw up (vomit).  · You have fewer bowel movements than usual or it is difficult for you to have a bowel movement (constipation).  This information is not intended to replace advice given to you by your health care provider. Make sure you discuss any questions you have with your health care provider.  Document Released: 03/11/2013 Document Revised: 05/31/2017 Document Reviewed: 05/04/2016  [employer] Interactive Patient Education © 2018 [employer]

## 2019-08-22 RX ORDER — LEVONORGESTREL AND ETHINYL ESTRADIOL 100-20(84)
KIT ORAL
Qty: 91 TABLET | Refills: 0 | Status: SHIPPED | OUTPATIENT
Start: 2019-08-22 | End: 2019-09-05 | Stop reason: SDUPTHER

## 2019-09-05 ENCOUNTER — OFFICE VISIT (OUTPATIENT)
Dept: OBSTETRICS AND GYNECOLOGY | Facility: CLINIC | Age: 39
End: 2019-09-05

## 2019-09-05 VITALS
BODY MASS INDEX: 28.28 KG/M2 | DIASTOLIC BLOOD PRESSURE: 76 MMHG | SYSTOLIC BLOOD PRESSURE: 118 MMHG | HEIGHT: 66 IN | WEIGHT: 176 LBS

## 2019-09-05 DIAGNOSIS — Z30.41 ENCOUNTER FOR SURVEILLANCE OF CONTRACEPTIVE PILLS: ICD-10-CM

## 2019-09-05 DIAGNOSIS — Z32.00 POSSIBLE PREGNANCY, NOT CONFIRMED: ICD-10-CM

## 2019-09-05 DIAGNOSIS — Z87.410 HISTORY OF CERVICAL DYSPLASIA: Primary | ICD-10-CM

## 2019-09-05 LAB
B-HCG UR QL: NEGATIVE
INTERNAL NEGATIVE CONTROL: NEGATIVE
INTERNAL POSITIVE CONTROL: POSITIVE
Lab: NORMAL

## 2019-09-05 PROCEDURE — 57505 ENDOCERVICAL CURETTAGE: CPT | Performed by: PHYSICIAN ASSISTANT

## 2019-09-05 PROCEDURE — 99213 OFFICE O/P EST LOW 20 MIN: CPT | Performed by: PHYSICIAN ASSISTANT

## 2019-09-05 PROCEDURE — 81025 URINE PREGNANCY TEST: CPT | Performed by: PHYSICIAN ASSISTANT

## 2019-09-05 RX ORDER — LEVONORGESTREL AND ETHINYL ESTRADIOL 100-20(84)
1 KIT ORAL DAILY
Qty: 91 TABLET | Refills: 3 | Status: SHIPPED | OUTPATIENT
Start: 2019-09-05 | End: 2021-08-13 | Stop reason: SDUPTHER

## 2019-09-05 NOTE — PROGRESS NOTES
"Subjective   Chief Complaint   Patient presents with   • Follow-up     Repeat pap and ECC, History of LEEP, Requesting a refill on oral contraceptives       Vielka Jennings is a 39 y.o. year old  presenting to be seen for follow up pap and ECC.  She had LEEP 2018 noting DAREN lll with close endocervical margin  This is her first follow up pap today  She is requesting refills of Seasonique oc's also  Has no bleeds with seasonique  No complaints or concerns today    Past Medical History:   Diagnosis Date   • Abnormal Pap smear of cervix    • Acid reflux    • Body piercing     EARS   • Cough     REPORTS SECONDARY TO SEASONAL ALLERGIES. REPORTS BEGAN AROUND 18. REPORTS NO FEVER OR PRODUCTION OF COUGH.   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 2016   • Heart murmur     REPORTS \"MURMUR AS A BABY\"   • IBS (irritable bowel syndrome)     REPORTS PAST HISTORY, NO CURRENT ISSUES   • Irritable bowel syndrome with diarrhea 6/3/2016   • Port-wine stain    • Seasonal allergies         Current Outpatient Medications:   •  calcium carbonate (TUMS) 500 MG chewable tablet, Chew 1 tablet Daily As Needed for Indigestion or Heartburn., Disp: , Rfl:   •  Levonorgest-Eth Estrad -Day 0.1-0.02 & 0.01 MG tablet, Take 1 tablet by mouth Daily., Disp: 91 tablet, Rfl: 3  •  cephalexin (KEFLEX) 500 MG capsule, Take 1 capsule by mouth 3 (Three) Times a Day., Disp: 30 capsule, Rfl: 0  •  guaiFENesin (MUCINEX) 600 MG 12 hr tablet, Take 1,200 mg by mouth 2 (Two) Times a Day., Disp: , Rfl:   •  HYDROcodone-acetaminophen (NORCO) 5-325 MG per tablet, Take 1 tablet by mouth Every 6 (Six) Hours As Needed (pain) for up to 12 doses., Disp: 12 tablet, Rfl: 0  •  phenazopyridine (PYRIDIUM) 100 MG tablet, TK 1 T PO BID, Disp: , Rfl: 0  •  predniSONE (DELTASONE) 10 MG tablet, Daily taper - 6/5/4/3//, Disp: 21 tablet, Rfl: 0  •  promethazine (PHENERGAN) 25 MG tablet, Take 1 tablet by mouth Every 6 (Six) Hours As " "Needed for Nausea or Vomiting for up to 20 doses., Disp: 20 tablet, Rfl: 0  •  promethazine-codeine (PHENERGAN with CODEINE) 6.25-10 MG/5ML syrup, Take 5 mL by mouth Every 4 (Four) Hours As Needed for Cough., Disp: 90 mL, Rfl: 0   Allergies   Allergen Reactions   • Levaquin [Levofloxacin] Hives   • Sulfa Antibiotics Hives   • Zithromax [Azithromycin] Diarrhea      Past Surgical History:   Procedure Laterality Date   • ANKLE SURGERY Left     REPORTS LIGAMENT REPAIR, THEN LATER HAD STITCHES REMOVED (1 YEAR POST OP)   • CERVICAL BIOPSY  W/ LOOP ELECTRODE EXCISION     • CHOLECYSTECTOMY     • DILATATION AND CURETTAGE     • ENDOSCOPY     • HYSTEROSCOPY ENDOMETRIAL ABLATION     • LEEP N/A 8/24/2018    Procedure: LOOP ELECTROCAUTERY EXCISION PROCEDURE;  Surgeon: Candy Knight MD;  Location: [facility];  Service: Obstetrics/Gynecology   • OTHER SURGICAL HISTORY      AFTER DELIVERY OF DAUGHTER REPORTS SURGICAL INTERVENTION FROM \"TEAR THAT WAS INTERNAL\"   • SKIN BIOPSY        Social History     Socioeconomic History   • Marital status:      Spouse name: Not on file   • Number of children: Not on file   • Years of education: Not on file   • Highest education level: Not on file   Tobacco Use   • Smoking status: Current Every Day Smoker     Packs/day: 0.50     Years: 18.00     Pack years: 9.00     Types: Cigarettes   • Smokeless tobacco: Never Used   Substance and Sexual Activity   • Alcohol use: No   • Drug use: No   • Sexual activity: No     Birth control/protection: OCP      Family History   Problem Relation Age of Onset   • Cancer Mother    • Hyperlipidemia Mother    • Melanoma Mother    • Hyperlipidemia Father    • Hypertension Father    • Hyperlipidemia Brother    • Hypertension Brother    • Thyroid disease Maternal Aunt    • Hyperlipidemia Maternal Grandmother    • Hyperlipidemia Maternal Grandfather    • Heart attack Paternal Grandmother    • Hyperlipidemia Paternal Grandmother    • Heart attack Paternal Grandfather " "   • Hyperlipidemia Paternal Grandfather        Review of Systems   Constitutional: Negative.    Gastrointestinal: Negative.    Genitourinary: Negative.            Objective   /76   Ht 167.6 cm (66\")   Wt 79.8 kg (176 lb)   Breastfeeding? No   BMI 28.41 kg/m²     Physical Exam   Constitutional: She appears well-developed and well-nourished. She is cooperative.   Genitourinary: Vagina normal. There is no tenderness or lesion on the right labia. There is no tenderness or lesion on the left labia. Cervix exhibits no motion tenderness, no discharge and no friability.   Genitourinary Comments: Pap and ECC done   Neurological: She is alert.   Skin: Skin is warm and dry.   Psychiatric: She has a normal mood and affect. Her behavior is normal.            Assessment and Plan  Vielka was seen today for follow-up.    Diagnoses and all orders for this visit:    History of cervical dysplasia  -     Liquid-based Pap Smear, Screening; Future    Encounter for surveillance of contraceptive pills    Possible pregnancy, not confirmed  -     POC Pregnancy, Urine    Other orders  -     Levonorgest-Eth Estrad 91-Day 0.1-0.02 & 0.01 MG tablet; Take 1 tablet by mouth Daily.      Patient Instructions   If pap normal repeat pap in 6 months             This note was electronically signed.    aSrah Biggs PA-C   September 5, 2019  "

## 2019-09-11 DIAGNOSIS — Z87.410 HISTORY OF CERVICAL DYSPLASIA: ICD-10-CM

## 2019-09-16 ENCOUNTER — OFFICE VISIT (OUTPATIENT)
Dept: FAMILY MEDICINE CLINIC | Facility: CLINIC | Age: 39
End: 2019-09-16

## 2019-09-16 VITALS
WEIGHT: 177 LBS | SYSTOLIC BLOOD PRESSURE: 120 MMHG | RESPIRATION RATE: 14 BRPM | HEIGHT: 66 IN | DIASTOLIC BLOOD PRESSURE: 82 MMHG | TEMPERATURE: 98.8 F | HEART RATE: 84 BPM | OXYGEN SATURATION: 95 % | BODY MASS INDEX: 28.45 KG/M2

## 2019-09-16 DIAGNOSIS — H65.191 ACUTE SEROMUCINOUS OTITIS MEDIA, RIGHT: ICD-10-CM

## 2019-09-16 DIAGNOSIS — J02.9 SORE THROAT: Primary | ICD-10-CM

## 2019-09-16 DIAGNOSIS — R52 BODY ACHES: ICD-10-CM

## 2019-09-16 DIAGNOSIS — J01.00 ACUTE MAXILLARY SINUSITIS, RECURRENCE NOT SPECIFIED: ICD-10-CM

## 2019-09-16 LAB
EXPIRATION DATE: NORMAL
EXPIRATION DATE: NORMAL
FLUAV AG NPH QL: NEGATIVE
FLUBV AG NPH QL: NEGATIVE
INTERNAL CONTROL: NORMAL
INTERNAL CONTROL: NORMAL
Lab: NORMAL
Lab: NORMAL
S PYO AG THROAT QL: NEGATIVE

## 2019-09-16 PROCEDURE — 87880 STREP A ASSAY W/OPTIC: CPT | Performed by: FAMILY MEDICINE

## 2019-09-16 PROCEDURE — 87804 INFLUENZA ASSAY W/OPTIC: CPT | Performed by: FAMILY MEDICINE

## 2019-09-16 PROCEDURE — 99213 OFFICE O/P EST LOW 20 MIN: CPT | Performed by: FAMILY MEDICINE

## 2019-09-16 PROCEDURE — 96372 THER/PROPH/DIAG INJ SC/IM: CPT | Performed by: FAMILY MEDICINE

## 2019-09-16 RX ORDER — RANITIDINE HCL 75 MG
TABLET ORAL
COMMUNITY
Start: 2019-07-01 | End: 2020-01-27

## 2019-09-16 RX ORDER — DEXAMETHASONE 0.5 MG/1
TABLET ORAL
Qty: 21 TABLET | Refills: 0 | Status: SHIPPED | OUTPATIENT
Start: 2019-09-16 | End: 2020-01-27 | Stop reason: SDUPTHER

## 2019-09-16 RX ORDER — CEFDINIR 300 MG/1
300 CAPSULE ORAL 2 TIMES DAILY
Qty: 14 CAPSULE | Refills: 0 | Status: SHIPPED | OUTPATIENT
Start: 2019-09-16 | End: 2020-01-27 | Stop reason: SDUPTHER

## 2019-09-16 RX ORDER — METHYLPREDNISOLONE ACETATE 40 MG/ML
80 INJECTION, SUSPENSION INTRA-ARTICULAR; INTRALESIONAL; INTRAMUSCULAR; SOFT TISSUE ONCE
Status: COMPLETED | OUTPATIENT
Start: 2019-09-16 | End: 2019-09-16

## 2019-09-16 RX ADMIN — METHYLPREDNISOLONE ACETATE 80 MG: 40 INJECTION, SUSPENSION INTRA-ARTICULAR; INTRALESIONAL; INTRAMUSCULAR; SOFT TISSUE at 11:57

## 2019-09-16 NOTE — PROGRESS NOTES
Established Patient        Chief Complaint:   Chief Complaint   Patient presents with   • Sore Throat   • CLEO Jennings is a 39 y.o. female    History of Present Illness:   Presenting with continued heavy nasal congestion and frontal facial pressure.  She describes tenderness to the face additionally.  She has had copious postnasal drainage which is resulted in persistence of mildly productive phlegm cough.  She describes pain to the right ear additionally.  She denies any fever or chills.  She denies any known contacts.       Subjective     The following portions of the patient's history were reviewed and updated as appropriate: allergies, current medications, past family history, past medical history, past social history, past surgical history and problem list.    Allergies   Allergen Reactions   • Levaquin [Levofloxacin] Hives   • Sulfa Antibiotics Hives   • Zithromax [Azithromycin] Diarrhea       Review of Systems  1. Constitutional: Negative for fever. Negative for chills, diaphoresis, fatigue and unexpected weight change.   2. HENT: No dysphagia; no changes to vision/hearing/smell/taste; no epistaxis.  As per above otherwise.  3. Eyes: Negative for redness and visual disturbance.   4. Respiratory: Cough as per above.  5. Cardiovascular: Negative for chest pain and palpitations.   6. Gastrointestinal: Negative for abdominal distention, abdominal pain and blood in stool.   7. Endocrine: Negative for cold intolerance and heat intolerance.   8. Genitourinary: Mild burning with urination, increased frequency.  No hematuria.  9. Musculoskeletal: Negative for arthralgias, back pain and myalgias.   10. Skin: Negative for color change, rash and wound.   11. Neurological: Negative for syncope, weakness and headaches.   12. Hematological: Negative for adenopathy. Does not bruise/bleed easily.   13. Psychiatric/Behavioral: Negative for confusion. The patient is not nervous/anxious.    Objective  "    Physical Exam   Vital Signs: /82   Pulse 84   Temp 98.8 °F (37.1 °C)   Resp 14   Ht 167.6 cm (66\")   Wt 80.3 kg (177 lb)   SpO2 95%   BMI 28.57 kg/m²     General Appearance: alert, oriented x 3, no acute distress.  Skin: warm and dry.  Known port wine stain to the right face area  HEENT: Atraumatic.  pupils round and reactive to light and accommodation, oral mucosa pink and moist.  Nares patent without epistaxis.  External auditory canals are patent tympanic membranes intact.  Neck: supple, no JVD, trachea midline.  No thyromegaly  Lungs: CTA, unlabored breathing effort.  Heart: RRR, normal S1 and S2, no S3, no rub.  Abdomen: soft, non-tender, no palpable bladder, present bowel sounds to auscultation ×4.  No guarding or rigidity.  Extremities: no clubbing, cyanosis or edema.  Good range of motion actively and passively.  Symmetric muscle strength and development  Neuro: normal speech and mental status.  Cranial nerves II through XII intact.  No anosmia. DTR 2+; proprioception intact.  No focal motor/sensory deficits.    Assessment and Plan      Assessment:   Vielka was seen today for sore throat and uri.    Diagnoses and all orders for this visit:    Sore throat  -     POCT rapid strep A    Body aches  -     POCT Influenza A/B    Acute maxillary sinusitis, recurrence not specified  -     methylPREDNISolone acetate (DEPO-medrol) injection 80 mg  -     cefdinir (OMNICEF) 300 MG capsule; Take 1 capsule by mouth 2 (Two) Times a Day.  -     dexamethasone (DECADRON) 0.5 MG tablet; 6 po x1d; then 5 po x 1d; then 4 po x 1d; then 3 po x 1d; then 2 po x 1d; then 1 po x1d; then STOP    Acute seromucinous otitis media, right  -     methylPREDNISolone acetate (DEPO-medrol) injection 80 mg  -     cefdinir (OMNICEF) 300 MG capsule; Take 1 capsule by mouth 2 (Two) Times a Day.  -     dexamethasone (DECADRON) 0.5 MG tablet; 6 po x1d; then 5 po x 1d; then 4 po x 1d; then 3 po x 1d; then 2 po x 1d; then 1 po x1d; then " STOP        Plan:  Flu and strep swab are negative.  Depo-Medrol 80 mg IM injection given today.  Instructions for a dexamethasone low-dose taper to begin tomorrow.  Twice daily dosing of Omnicef.  Return to clinic should she not improve, or if she worsens.  I recommended over-the-counter hypertonic nasal saline spray periodically through the day to aid in symptom relief additionally.  Discussion Summary:    Discussed plan of care in detail with pt today; pt verb understanding and agrees.  Follow up:  No Follow-up on file.     There are no Patient Instructions on file for this visit.    Patricio Bass,   09/16/19  11:56 AM    Please note that portions of this note may have been completed with a voice recognition program. Efforts were made to edit the dictations, but occasionally words are mistranscribed.

## 2019-09-21 ENCOUNTER — OFFICE VISIT (OUTPATIENT)
Dept: RETAIL CLINIC | Facility: CLINIC | Age: 39
End: 2019-09-21

## 2019-09-21 VITALS
SYSTOLIC BLOOD PRESSURE: 130 MMHG | TEMPERATURE: 98 F | DIASTOLIC BLOOD PRESSURE: 90 MMHG | HEIGHT: 66 IN | OXYGEN SATURATION: 98 % | RESPIRATION RATE: 16 BRPM | HEART RATE: 92 BPM | WEIGHT: 176.4 LBS | BODY MASS INDEX: 28.35 KG/M2

## 2019-09-21 DIAGNOSIS — J40 BRONCHITIS: ICD-10-CM

## 2019-09-21 DIAGNOSIS — Q82.5 PORT-WINE STAIN OF FACE: ICD-10-CM

## 2019-09-21 DIAGNOSIS — J06.9 UPPER RESPIRATORY TRACT INFECTION, UNSPECIFIED TYPE: Primary | ICD-10-CM

## 2019-09-21 DIAGNOSIS — H66.91 RIGHT OTITIS MEDIA, UNSPECIFIED OTITIS MEDIA TYPE: ICD-10-CM

## 2019-09-21 PROCEDURE — 99213 OFFICE O/P EST LOW 20 MIN: CPT | Performed by: NURSE PRACTITIONER

## 2019-09-21 RX ORDER — NEOMYCIN SULFATE, POLYMYXIN B SULFATE, HYDROCORTISONE 3.5; 10000; 1 MG/ML; [USP'U]/ML; MG/ML
4 SOLUTION/ DROPS AURICULAR (OTIC) 3 TIMES DAILY
Qty: 1 BOTTLE | Refills: 0 | Status: SHIPPED | OUTPATIENT
Start: 2019-09-21 | End: 2019-10-01

## 2019-09-21 RX ORDER — BENZONATATE 100 MG/1
100 CAPSULE ORAL 3 TIMES DAILY PRN
Qty: 21 CAPSULE | Refills: 0 | Status: SHIPPED | OUTPATIENT
Start: 2019-09-21 | End: 2019-09-28

## 2019-09-21 RX ORDER — CEFDINIR 300 MG/1
300 CAPSULE ORAL 2 TIMES DAILY
Qty: 6 CAPSULE | Refills: 0 | Status: SHIPPED | OUTPATIENT
Start: 2019-09-21 | End: 2019-09-24

## 2019-09-21 RX ORDER — ALBUTEROL SULFATE 90 UG/1
2 AEROSOL, METERED RESPIRATORY (INHALATION) EVERY 4 HOURS PRN
Qty: 1 INHALER | Refills: 0 | Status: SHIPPED | OUTPATIENT
Start: 2019-09-21 | End: 2020-01-27

## 2019-09-21 NOTE — PATIENT INSTRUCTIONS
"Upper Respiratory Infection, Adult  An upper respiratory infection (URI) affects the nose, throat, and upper air passages. URIs are caused by germs (viruses). The most common type of URI is often called \"the common cold.\"  Medicines cannot cure URIs, but you can do things at home to relieve your symptoms. URIs usually get better within 7-10 days.  Follow these instructions at home:  Activity  · Rest as needed.  · If you have a fever, stay home from work or school until your fever is gone, or until your doctor says you may return to work or school.  ? You should stay home until you cannot spread the infection anymore (you are not contagious).  ? Your doctor may have you wear a face mask so you have less risk of spreading the infection.  Relieving symptoms  · Gargle with a salt-water mixture 3-4 times a day or as needed. To make a salt-water mixture, completely dissolve ½-1 tsp of salt in 1 cup of warm water.  · Use a cool-mist humidifier to add moisture to the air. This can help you breathe more easily.  Eating and drinking    · Drink enough fluid to keep your pee (urine) pale yellow.  · Eat soups and other clear broths.  General instructions    · Take over-the-counter and prescription medicines only as told by your doctor. These include cold medicines, fever reducers, and cough suppressants.  · Do not use any products that contain nicotine or tobacco. These include cigarettes and e-cigarettes. If you need help quitting, ask your doctor.  · Avoid being where people are smoking (avoid secondhand smoke).  · Make sure you get regular shots and get the flu shot every year.  · Keep all follow-up visits as told by your doctor. This is important.  How to avoid spreading infection to others    · Wash your hands often with soap and water. If you do not have soap and water, use hand .  · Avoid touching your mouth, face, eyes, or nose.  · Cough or sneeze into a tissue or your sleeve or elbow. Do not cough or sneeze " "into your hand or into the air.  Contact a doctor if:  · You are getting worse, not better.  · You have any of these:  ? A fever.  ? Chills.  ? Brown or red mucus in your nose.  ? Yellow or brown fluid (discharge)coming from your nose.  ? Pain in your face, especially when you bend forward.  ? Swollen neck glands.  ? Pain with swallowing.  ? White areas in the back of your throat.  Get help right away if:  · You have shortness of breath that gets worse.  · You have very bad or constant:  ? Headache.  ? Ear pain.  ? Pain in your forehead, behind your eyes, and over your cheekbones (sinus pain).  ? Chest pain.  · You have long-lasting (chronic) lung disease along with any of these:  ? Wheezing.  ? Long-lasting cough.  ? Coughing up blood.  ? A change in your usual mucus.  · You have a stiff neck.  · You have changes in your:  ? Vision.  ? Hearing.  ? Thinking.  ? Mood.  Summary  · An upper respiratory infection (URI) is caused by a germ called a virus. The most common type of URI is often called \"the common cold.\"  · URIs usually get better within 7-10 days.  · Take over-the-counter and prescription medicines only as told by your doctor.  This information is not intended to replace advice given to you by your health care provider. Make sure you discuss any questions you have with your health care provider.  Document Released: 06/05/2009 Document Revised: 08/10/2018 Document Reviewed: 08/10/2018  [employer] Interactive Patient Education © 2019 [employer]    Acute Bronchitis, Adult  Acute bronchitis is when air tubes (bronchi) in the lungs suddenly get swollen. The condition can make it hard to breathe. It can also cause these symptoms:  · A cough.  · Coughing up clear, yellow, or green mucus.  · Wheezing.  · Chest congestion.  · Shortness of breath.  · A fever.  · Body aches.  · Chills.  · A sore throat.  Follow these instructions at home:    Medicines  · Take over-the-counter and prescription medicines only as told " by your doctor.  · If you were prescribed an antibiotic medicine, take it as told by your doctor. Do not stop taking the antibiotic even if you start to feel better.  General instructions  · Rest.  · Drink enough fluids to keep your pee (urine) pale yellow.  · Avoid smoking and secondhand smoke. If you smoke and you need help quitting, ask your doctor. Quitting will help your lungs heal faster.  · Use an inhaler, cool mist vaporizer, or humidifier as told by your doctor.  · Keep all follow-up visits as told by your doctor. This is important.  How is this prevented?  To lower your risk of getting this condition again:  · Wash your hands often with soap and water. If you cannot use soap and water, use hand .  · Avoid contact with people who have cold symptoms.  · Try not to touch your hands to your mouth, nose, or eyes.  · Make sure to get the flu shot every year.  Contact a doctor if:  · Your symptoms do not get better in 2 weeks.  Get help right away if:  · You cough up blood.  · You have chest pain.  · You have very bad shortness of breath.  · You become dehydrated.  · You faint (pass out) or keep feeling like you are going to pass out.  · You keep throwing up (vomiting).  · You have a very bad headache.  · Your fever or chills gets worse.  This information is not intended to replace advice given to you by your health care provider. Make sure you discuss any questions you have with your health care provider.  Document Released: 06/05/2009 Document Revised: 08/01/2018 Document Reviewed: 06/07/2017  [employer] Interactive Patient Education © 2019 [employer]    Otitis Media, Adult    Otitis media means that the middle ear is red and swollen (inflamed) and full of fluid. The condition usually goes away on its own.  Follow these instructions at home:  · Take over-the-counter and prescription medicines only as told by your doctor.  · If you were prescribed an antibiotic medicine, take it as told by your doctor.  Do not stop taking the antibiotic even if you start to feel better.  · Keep all follow-up visits as told by your doctor. This is important.  Contact a doctor if:  · You have bleeding from your nose.  · There is a lump on your neck.  · You are not getting better in 5 days.  · You feel worse instead of better.  Get help right away if:  · You have pain that is not helped with medicine.  · You have swelling, redness, or pain around your ear.  · You get a stiff neck.  · You cannot move part of your face (paralyzed).  · You notice that the bone behind your ear hurts when you touch it.  · You get a very bad headache.  Summary  · Otitis media means that the middle ear is red, swollen, and full of fluid.  · This condition usually goes away on its own. In some cases, treatment may be needed.  · If you were prescribed an antibiotic medicine, take it as told by your doctor.  This information is not intended to replace advice given to you by your health care provider. Make sure you discuss any questions you have with your health care provider.  Document Released: 06/05/2009 Document Revised: 01/08/2018 Document Reviewed: 01/08/2018  [employer] Interactive Patient Education © 2019 [employer]

## 2019-09-21 NOTE — PROGRESS NOTES
Subjective   Vielka Jennings is a 39 y.o. female.   Chief Complaint   Patient presents with   • URI   • Bronchitis      Pt presents to the clinic today for evaluation.  Complaints of worsening cough and chest congestion.  St and fever are better.  R ear seems to be worse.  She is a smoker, no hx of asthma.  Has several days of steroids but will finish antibiotics tomorrow.  No n/v/d.  Was seen at pcp and received steroids, a shot and rx for antibiotics earlier this week.        URI    This is a new problem. The current episode started in the past 7 days. The problem has been waxing and waning. There has been no fever. Associated symptoms include congestion, coughing, ear pain, headaches and rhinorrhea. Pertinent negatives include no abdominal pain, diarrhea, nausea, rash, sneezing, sore throat, vomiting or wheezing.   Bronchitis   Associated symptoms include congestion, coughing and headaches. Pertinent negatives include no abdominal pain, chills, fatigue, fever, nausea, rash, sore throat or vomiting.        The following portions of the patient's history were reviewed and updated as appropriate: allergies, current medications, past family history, past medical history, past social history, past surgical history and problem list.    Current Outpatient Medications:   •  albuterol sulfate  (90 Base) MCG/ACT inhaler, Inhale 2 puffs Every 4 (Four) Hours As Needed for Wheezing., Disp: 1 inhaler, Rfl: 0  •  benzonatate (TESSALON PERLES) 100 MG capsule, Take 1 capsule by mouth 3 (Three) Times a Day As Needed for Cough for up to 7 days., Disp: 21 capsule, Rfl: 0  •  calcium carbonate (TUMS) 500 MG chewable tablet, Chew 1 tablet Daily As Needed for Indigestion or Heartburn., Disp: , Rfl:   •  cefdinir (OMNICEF) 300 MG capsule, Take 1 capsule by mouth 2 (Two) Times a Day., Disp: 14 capsule, Rfl: 0  •  cefdinir (OMNICEF) 300 MG capsule, Take 1 capsule by mouth 2 (Two) Times a Day for 3 days., Disp: 6 capsule,  "Rfl: 0  •  dexamethasone (DECADRON) 0.5 MG tablet, 6 po x1d; then 5 po x 1d; then 4 po x 1d; then 3 po x 1d; then 2 po x 1d; then 1 po x1d; then STOP, Disp: 21 tablet, Rfl: 0  •  Levonorgest-Eth Estrad 91-Day 0.1-0.02 & 0.01 MG tablet, Take 1 tablet by mouth Daily., Disp: 91 tablet, Rfl: 3  •  neomycin-polymyxin-hydrocortisone (CORTISPORIN) 1 % solution otic solution, Administer 4 drops to the right ear 3 (Three) Times a Day for 10 days., Disp: 1 bottle, Rfl: 0  •  raNITIdine (ZANTAC) 75 MG tablet, , Disp: , Rfl:     Review of Systems   Constitutional: Negative for activity change, appetite change, chills, fatigue and fever.   HENT: Positive for congestion, ear pain and rhinorrhea. Negative for drooling, ear discharge, mouth sores, nosebleeds, postnasal drip, sneezing and sore throat.    Eyes: Negative for pain, discharge and visual disturbance.   Respiratory: Positive for cough. Negative for shortness of breath and wheezing.    Gastrointestinal: Negative for abdominal pain, constipation, diarrhea, nausea and vomiting.   Skin: Negative for rash and wound.   Neurological: Positive for headaches.     /90 (BP Location: Right arm, Patient Position: Sitting, Cuff Size: Adult)   Pulse 92   Temp 98 °F (36.7 °C) (Oral)   Resp 16   Ht 167.6 cm (66\")   Wt 80 kg (176 lb 6.4 oz)   SpO2 98%   BMI 28.47 kg/m²     Objective   Allergies   Allergen Reactions   • Levaquin [Levofloxacin] Hives   • Sulfa Antibiotics Hives   • Zithromax [Azithromycin] Diarrhea       Physical Exam   Constitutional: Vital signs are normal. She appears well-developed and well-nourished.   HENT:   Head: Normocephalic.   Right Ear: Tympanic membrane is erythematous.   Left Ear: Tympanic membrane normal.   Nose: Nose normal.   Mouth/Throat: Uvula is midline, oropharynx is clear and moist and mucous membranes are normal.   Port wine stain noted in R ear canal and R pharynx    Eyes: Conjunctivae and EOM are normal. Pupils are equal, round, and " reactive to light.   Neck: Normal range of motion. Neck supple. No thyromegaly present.   Cardiovascular: Normal rate and regular rhythm.   Pulmonary/Chest: Effort normal. She has wheezes (diffusely throughtout di roberts).   Deep croupy cough in exam room   Abdominal: Soft. Normal appearance and bowel sounds are normal. She exhibits no mass. There is no hepatosplenomegaly. There is no tenderness.   Musculoskeletal: Normal range of motion.   Neurological: She is alert.   Skin: Skin is warm and dry.       Assessment/Plan   Vielka was seen today for uri and bronchitis.    Diagnoses and all orders for this visit:    Upper respiratory tract infection, unspecified type    Right otitis media, unspecified otitis media type    Port-wine stain of face    Bronchitis    Other orders  -     cefdinir (OMNICEF) 300 MG capsule; Take 1 capsule by mouth 2 (Two) Times a Day for 3 days.  -     benzonatate (TESSALON PERLES) 100 MG capsule; Take 1 capsule by mouth 3 (Three) Times a Day As Needed for Cough for up to 7 days.  -     albuterol sulfate  (90 Base) MCG/ACT inhaler; Inhale 2 puffs Every 4 (Four) Hours As Needed for Wheezing.  -     neomycin-polymyxin-hydrocortisone (CORTISPORIN) 1 % solution otic solution; Administer 4 drops to the right ear 3 (Three) Times a Day for 10 days.        Continue current medications and extend antibiotic therapy, symptomatic and supportive therapy and follow up if not improving or worsening in symptoms.  She expresses understanding.

## 2019-12-02 RX ORDER — LEVONORGESTREL AND ETHINYL ESTRADIOL 100-20(84)
KIT ORAL
Qty: 91 TABLET | Refills: 2 | Status: SHIPPED | OUTPATIENT
Start: 2019-12-02 | End: 2020-01-27 | Stop reason: SDUPTHER

## 2020-01-27 ENCOUNTER — OFFICE VISIT (OUTPATIENT)
Dept: FAMILY MEDICINE CLINIC | Facility: CLINIC | Age: 40
End: 2020-01-27

## 2020-01-27 VITALS
WEIGHT: 174 LBS | SYSTOLIC BLOOD PRESSURE: 130 MMHG | TEMPERATURE: 97.9 F | OXYGEN SATURATION: 100 % | DIASTOLIC BLOOD PRESSURE: 92 MMHG | HEIGHT: 66 IN | HEART RATE: 96 BPM | BODY MASS INDEX: 27.97 KG/M2

## 2020-01-27 DIAGNOSIS — L01.00 IMPETIGO: ICD-10-CM

## 2020-01-27 DIAGNOSIS — J01.00 ACUTE MAXILLARY SINUSITIS, RECURRENCE NOT SPECIFIED: Primary | ICD-10-CM

## 2020-01-27 PROCEDURE — 99213 OFFICE O/P EST LOW 20 MIN: CPT | Performed by: FAMILY MEDICINE

## 2020-01-27 PROCEDURE — 96372 THER/PROPH/DIAG INJ SC/IM: CPT | Performed by: FAMILY MEDICINE

## 2020-01-27 RX ORDER — CEFDINIR 300 MG/1
300 CAPSULE ORAL 2 TIMES DAILY
Qty: 14 CAPSULE | Refills: 0 | OUTPATIENT
Start: 2020-01-27 | End: 2020-04-22

## 2020-01-27 RX ORDER — METHYLPREDNISOLONE ACETATE 80 MG/ML
80 INJECTION, SUSPENSION INTRA-ARTICULAR; INTRALESIONAL; INTRAMUSCULAR; SOFT TISSUE ONCE
Status: COMPLETED | OUTPATIENT
Start: 2020-01-27 | End: 2020-01-27

## 2020-01-27 RX ORDER — DEXAMETHASONE 0.5 MG/1
TABLET ORAL
Qty: 21 TABLET | Refills: 0 | OUTPATIENT
Start: 2020-01-27 | End: 2020-04-22

## 2020-01-27 RX ADMIN — METHYLPREDNISOLONE ACETATE 80 MG: 80 INJECTION, SUSPENSION INTRA-ARTICULAR; INTRALESIONAL; INTRAMUSCULAR; SOFT TISSUE at 09:50

## 2020-01-27 NOTE — PROGRESS NOTES
Established Patient        Chief Complaint:   Chief Complaint   Patient presents with   • Blister     painful blisters on face x 1 day        Vielka Jennings is a 39 y.o. female    History of Present Illness:   She is here today complaining of a crusty rash to her bilateral nares, as well as progressive and persistent nasal congestion and frontal facial pressure.  She describes postnasal drainage with productive cough of similar color.  Denies any hemoptysis.  No known sick contacts.  Denies fever or chills.    Subjective     The following portions of the patient's history were reviewed and updated as appropriate: allergies, current medications, past family history, past medical history, past social history, past surgical history and problem list.    Allergies   Allergen Reactions   • Levaquin [Levofloxacin] Hives   • Sulfa Antibiotics Hives   • Zithromax [Azithromycin] Diarrhea       Review of Systems  1. Constitutional: Negative for fever. Negative for chills, diaphoresis, fatigue and unexpected weight change.   2. HENT: No dysphagia; no changes to vision/hearing/smell/taste; no epistaxis  3. Eyes: Negative for redness and visual disturbance.   4. Respiratory: negative for shortness of breath. Negative for chest pain . Negative for cough and chest tightness.   5. Cardiovascular: Negative for chest pain and palpitations.   6. Gastrointestinal: Negative for abdominal distention, abdominal pain and blood in stool.   7. Endocrine: Negative for cold intolerance and heat intolerance.   8. Genitourinary: Negative for difficulty urinating, dysuria and frequency.   9. Musculoskeletal: Negative for arthralgias, back pain and myalgias.   10. Skin: Negative for color change.  Rash noted around her nares.  11. Neurological: Negative for syncope, weakness and headaches.   12. Hematological: Negative for adenopathy. Does not bruise/bleed easily.   13. Psychiatric/Behavioral: Negative for confusion. The patient is not  "nervous/anxious.    Objective     Physical Exam   Vital Signs: /92   Pulse 96   Temp 97.9 °F (36.6 °C)   Ht 167.6 cm (66\")   Wt 78.9 kg (174 lb)   SpO2 100%   BMI 28.08 kg/m²     General Appearance: alert, oriented x 3, no acute distress.  Mildly ill-appearing female.  Interactive during questioning and examination.  Skin: warm and dry.  Chronic port wine stain of the face.  Did brown sugar crusted areas consistent with impetigo to bilateral naris.  HEENT: Atraumatic.  pupils round and reactive to light and accommodation, oral mucosa pink and moist.  Nares patent without epistaxis.  External auditory canals are patent tympanic membranes intact.  Boggy/inflamed nasal turbinates bilaterally, mucus and interest of bilateral maxillary sinuses.  Tenderness on palpation of the sinuses additionally.  Mild effusion noted bilateral tympanic membranes, of serous quality, intact mobility on Valsalva and insufflation.  Moderate postnasal drainage without pustules or exudate.  Neck: supple, no JVD, trachea midline.  No thyromegaly  Lungs: CTA, unlabored breathing effort.  Heart: RRR, normal S1 and S2, no S3, no rub.  Abdomen: soft, non-tender, no palpable bladder, present bowel sounds to auscultation ×4.  No guarding or rigidity.  Extremities: no clubbing, cyanosis or edema.  Good range of motion actively and passively.  Symmetric muscle strength and development  Neuro: normal speech and mental status.  Cranial nerves II through XII intact.  No anosmia. DTR 2+; proprioception intact.  No focal motor/sensory deficits.    Assessment and Plan      Assessment:   Vielka was seen today for blister.    Diagnoses and all orders for this visit:    Acute maxillary sinusitis, recurrence not specified  -     cefdinir (OMNICEF) 300 MG capsule; Take 1 capsule by mouth 2 (Two) Times a Day.  -     dexamethasone (DECADRON) 0.5 MG tablet; 6 po x1d; then 5 po x 1d; then 4 po x 1d; then 3 po x 1d; then 2 po x 1d; then 1 po x1d; then " STOP  -     methylPREDNISolone acetate (DEPO-medrol) injection 80 mg    Impetigo  -     cefdinir (OMNICEF) 300 MG capsule; Take 1 capsule by mouth 2 (Two) Times a Day.        Plan:  I have recommended patient remain off work for the remainder of today.    Depo-Medrol 80 mg IM given today.    7-day course of cefdinir, twice daily dosing.  Patient was also given a low-dose 6-day dexamethasone taper.  Should she not improve, or worsen, she is to return to clinic sooner than her next scheduled follow-up visit.  Discussion Summary:    Discussed plan of care in detail with pt today; pt verb understanding and agrees.  Follow up:  Return if symptoms worsen or fail to improve.     There are no Patient Instructions on file for this visit.    Patricio Bass,   01/27/20  9:49 AM          Please note that portions of this note may have been completed with a voice recognition program. Efforts were made to edit the dictations, but occasionally words are mistranscribed.

## 2020-06-17 ENCOUNTER — TRANSCRIBE ORDERS (OUTPATIENT)
Dept: ADMINISTRATIVE | Facility: HOSPITAL | Age: 40
End: 2020-06-17

## 2020-06-17 DIAGNOSIS — M25.571 RIGHT ANKLE PAIN, UNSPECIFIED CHRONICITY: Primary | ICD-10-CM

## 2020-06-25 ENCOUNTER — HOSPITAL ENCOUNTER (OUTPATIENT)
Dept: MRI IMAGING | Facility: HOSPITAL | Age: 40
Discharge: HOME | End: 2020-06-25
Admitting: ORTHOPAEDIC SURGERY

## 2020-06-25 DIAGNOSIS — M25.571 RIGHT ANKLE PAIN, UNSPECIFIED CHRONICITY: ICD-10-CM

## 2020-06-25 PROCEDURE — 73721 MRI JNT OF LWR EXTRE W/O DYE: CPT

## 2020-08-25 ENCOUNTER — OFFICE VISIT (OUTPATIENT)
Dept: FAMILY MEDICINE CLINIC | Facility: CLINIC | Age: 40
End: 2020-08-25

## 2020-08-25 VITALS
DIASTOLIC BLOOD PRESSURE: 80 MMHG | HEIGHT: 66 IN | WEIGHT: 178 LBS | HEART RATE: 99 BPM | SYSTOLIC BLOOD PRESSURE: 130 MMHG | TEMPERATURE: 99.5 F | BODY MASS INDEX: 28.61 KG/M2 | OXYGEN SATURATION: 99 %

## 2020-08-25 DIAGNOSIS — J01.00 ACUTE MAXILLARY SINUSITIS, RECURRENCE NOT SPECIFIED: Primary | ICD-10-CM

## 2020-08-25 DIAGNOSIS — H65.02 ACUTE SEROUS OTITIS MEDIA OF LEFT EAR, RECURRENCE NOT SPECIFIED: ICD-10-CM

## 2020-08-25 PROCEDURE — 99213 OFFICE O/P EST LOW 20 MIN: CPT | Performed by: FAMILY MEDICINE

## 2020-08-25 RX ORDER — OMEPRAZOLE 20 MG/1
20 CAPSULE, DELAYED RELEASE ORAL DAILY
COMMUNITY
End: 2021-06-15

## 2020-08-25 RX ORDER — DEXAMETHASONE 0.5 MG/1
TABLET ORAL
Qty: 21 TABLET | Refills: 0 | Status: SHIPPED | OUTPATIENT
Start: 2020-08-25 | End: 2021-06-15

## 2020-08-25 RX ORDER — BROMPHENIRAMINE MALEATE, PSEUDOEPHEDRINE HYDROCHLORIDE, AND DEXTROMETHORPHAN HYDROBROMIDE 2; 30; 10 MG/5ML; MG/5ML; MG/5ML
5 SYRUP ORAL 3 TIMES DAILY PRN
Qty: 118 ML | Refills: 0 | Status: SHIPPED | OUTPATIENT
Start: 2020-08-25 | End: 2021-06-15

## 2020-08-25 RX ORDER — CEFDINIR 300 MG/1
300 CAPSULE ORAL 2 TIMES DAILY
Qty: 14 CAPSULE | Refills: 0 | Status: SHIPPED | OUTPATIENT
Start: 2020-08-25 | End: 2021-06-15

## 2020-08-25 NOTE — PROGRESS NOTES
Established Patient        Chief Complaint:   Chief Complaint   Patient presents with   • Nasal Congestion     allergies; patient had negative COVID test this AM        Vielka Jennings is a 40 y.o. female    History of Present Illness:   Here today with complaints of heavy nasal congestion and frontal facial pressure.  Patient states she had a negative cover test this morning.  She denies any fever, chills or night sweats.  No known sick contacts.  She does have a past history of recurrent maxillary sinusitis.  She describes bilateral ear fullness, slightly worse to the left.  No reports of any epistaxis or hemoptysis.  Denies any dysuria or hematuria.  Denies any aspiration or dysphagia.  Maintains good urine output, normal bowel function.       Subjective     The following portions of the patient's history were reviewed and updated as appropriate: allergies, current medications, past family history, past medical history, past social history, past surgical history and problem list.    Allergies   Allergen Reactions   • Levaquin [Levofloxacin] Hives   • Sulfa Antibiotics Hives   • Zithromax [Azithromycin] Diarrhea       Review of Systems  1. Constitutional: Negative for fever. Negative for chills, diaphoresis, fatigue and unexpected weight change.   2. HENT: No dysphagia; no changes to vision/hearing/smell/taste; no epistaxis.  As per above otherwise.  3. Eyes: Negative for redness and visual disturbance.   4. Respiratory: Cough as per above.  5. Cardiovascular: Negative for chest pain and palpitations.   6. Gastrointestinal: Negative for abdominal distention, abdominal pain and blood in stool.   7. Endocrine: Negative for cold intolerance and heat intolerance.   8. Genitourinary: Mild burning with urination, increased frequency.  No hematuria.  9. Musculoskeletal: Negative for arthralgias, back pain and myalgias.   10. Skin: Negative for color change, rash and wound.   11. Neurological: Negative for  "syncope, weakness and headaches.   12. Hematological: Negative for adenopathy. Does not bruise/bleed easily.   13. Psychiatric/Behavioral: Negative for confusion. The patient is not nervous/anxious.    Objective     Physical Exam   Vital Signs: /80   Pulse 99   Temp 99.5 °F (37.5 °C)   Ht 167.6 cm (66\")   Wt 80.7 kg (178 lb)   SpO2 99%   BMI 28.73 kg/m²     General Appearance: alert, oriented x 3, no acute distress.  Skin: warm and dry.  Known port wine stain to the right face area  HEENT: Atraumatic.  pupils round and reactive to light and accommodation, oral mucosa pink and moist.  Nares patent without epistaxis.  External auditory canals are patent tympanic membranes intact.  Boggy/inflamed nasal turbinates bilaterally with maxillary tenderness on palpation and mucus on entrance to sinuses.  Serous effusion noted to the left tympanic membrane with decreased mobility of Valsalva and insufflation.  Moderate postnasal drainage without pustules or exudate.  Neck: supple, no JVD, trachea midline.  No thyromegaly  Lungs: CTA, unlabored breathing effort.  Heart: RRR, normal S1 and S2, no S3, no rub.  Abdomen: soft, non-tender, no palpable bladder, present bowel sounds to auscultation ×4.  No guarding or rigidity.  Extremities: no clubbing, cyanosis or edema.  Good range of motion actively and passively.  Symmetric muscle strength and development  Neuro: normal speech and mental status.  Cranial nerves II through XII intact.  No anosmia. DTR 2+; proprioception intact.  No focal motor/sensory deficits.    Assessment and Plan      Assessment:   Vielka was seen today for nasal congestion.    Diagnoses and all orders for this visit:    Acute maxillary sinusitis, recurrence not specified  -     cefdinir (OMNICEF) 300 MG capsule; Take 1 capsule by mouth 2 (Two) Times a Day.  -     dexamethasone (DECADRON) 0.5 MG tablet; 6 po x1d; then 5 po x 1d; then 4 po x 1d; then 3 po x 1d; then 2 po x 1d; then 1 po x1d; then " STOP  -     brompheniramine-pseudoephedrine-DM 30-2-10 MG/5ML syrup; Take 5 mL by mouth 3 (Three) Times a Day As Needed for Congestion or Cough.    Acute serous otitis media of left ear, recurrence not specified  -     dexamethasone (DECADRON) 0.5 MG tablet; 6 po x1d; then 5 po x 1d; then 4 po x 1d; then 3 po x 1d; then 2 po x 1d; then 1 po x1d; then STOP  -     brompheniramine-pseudoephedrine-DM 30-2-10 MG/5ML syrup; Take 5 mL by mouth 3 (Three) Times a Day As Needed for Congestion or Cough.        Plan:  Planned utilization of Bromfed for symptomatic management.    Low-dose 6-day dexamethasone taper and 7-day twice daily dosing cefdinir.    Patient can utilize over-the-counter hypertonic nasal saline spray additionally to aid in her symptom management.  Return to the clinic should she not improve, or if she worsens.  Discussion Summary:    Discussed plan of care in detail with pt today; pt verb understanding and agrees.  Follow up:  No follow-ups on file.     There are no Patient Instructions on file for this visit.    Patricio Bass, DO  08/25/20  15:15    Please note that portions of this note may have been completed with a voice recognition program. Efforts were made to edit the dictations, but occasionally words are mistranscribed.

## 2021-06-15 ENCOUNTER — OFFICE VISIT (OUTPATIENT)
Dept: FAMILY MEDICINE CLINIC | Facility: CLINIC | Age: 41
End: 2021-06-15

## 2021-06-15 VITALS
SYSTOLIC BLOOD PRESSURE: 128 MMHG | OXYGEN SATURATION: 98 % | WEIGHT: 179 LBS | HEART RATE: 77 BPM | BODY MASS INDEX: 28.89 KG/M2 | RESPIRATION RATE: 16 BRPM | DIASTOLIC BLOOD PRESSURE: 82 MMHG

## 2021-06-15 DIAGNOSIS — K21.9 GASTROESOPHAGEAL REFLUX DISEASE WITHOUT ESOPHAGITIS: Primary | ICD-10-CM

## 2021-06-15 PROCEDURE — 99213 OFFICE O/P EST LOW 20 MIN: CPT | Performed by: NURSE PRACTITIONER

## 2021-06-15 RX ORDER — PANTOPRAZOLE SODIUM 40 MG/1
40 TABLET, DELAYED RELEASE ORAL EVERY MORNING
Qty: 90 TABLET | Refills: 3 | Status: SHIPPED | OUTPATIENT
Start: 2021-06-15 | End: 2022-03-03 | Stop reason: SDUPTHER

## 2021-06-15 RX ORDER — FAMOTIDINE 20 MG/1
20 TABLET, FILM COATED ORAL NIGHTLY PRN
Qty: 30 TABLET | Refills: 2 | Status: SHIPPED | OUTPATIENT
Start: 2021-06-15 | End: 2021-08-27 | Stop reason: SDUPTHER

## 2021-06-15 NOTE — PROGRESS NOTES
Subjective     Chief Complaint:    Chief Complaint   Patient presents with   • Heartburn       History of Present Illness:   Notes worsening gerd. Now happening nightly. She will gave abdominal burning, nausea, will feel acid come up in her throat, she will vomit, sometimes that helps. She sleeps on 1 pillow. She takes prilosec for about a year. She takes it at night.   No change in stool. BM are normal.   Had EGD in 2006 with Dr. Gallardo that showed ulcer. She was given protonix at that time and it worked. No h pylori.   She is having trouble sleeping due to symptoms.       Review of Systems  Gen- No fevers, chills  CV- No chest pain, palpitations  Resp- No cough, dyspnea  Neuro-No dizziness, headaches      I have reviewed and/or updated the patient's past medical, surgical, family, social history and problem list as appropriate.     Medications:    Current Outpatient Medications:   •  famotidine (Pepcid) 20 MG tablet, Take 1 tablet by mouth At Night As Needed for Heartburn., Disp: 30 tablet, Rfl: 2  •  Levonorgest-Eth Estrad 91-Day 0.1-0.02 & 0.01 MG tablet, Take 1 tablet by mouth Daily., Disp: 91 tablet, Rfl: 3  •  pantoprazole (Protonix) 40 MG EC tablet, Take 1 tablet by mouth Every Morning., Disp: 90 tablet, Rfl: 3    Allergies:  Allergies   Allergen Reactions   • Levaquin [Levofloxacin] Hives   • Sulfa Antibiotics Hives   • Zithromax [Azithromycin] Diarrhea       Objective     Vital Signs:   Vitals:    06/15/21 1307   BP: 128/82   Pulse: 77   Resp: 16   SpO2: 98%   Weight: 81.2 kg (179 lb)       Physical Exam:    Physical Exam  Vitals and nursing note reviewed.   Constitutional:       Appearance: She is well-developed.   HENT:      Head: Normocephalic and atraumatic.   Eyes:      Pupils: Pupils are equal, round, and reactive to light.   Cardiovascular:      Rate and Rhythm: Normal rate and regular rhythm.      Heart sounds: Normal heart sounds.   Pulmonary:      Effort: Pulmonary effort is normal.       Breath sounds: Normal breath sounds.   Abdominal:      General: Bowel sounds are normal. There is no distension.      Palpations: Abdomen is soft.      Tenderness: There is abdominal tenderness.      Comments: Epigastric    Musculoskeletal:      Cervical back: Neck supple.   Skin:     General: Skin is warm and dry.      Capillary Refill: Capillary refill takes less than 2 seconds.   Neurological:      General: No focal deficit present.      Mental Status: She is alert and oriented to person, place, and time.   Psychiatric:         Mood and Affect: Mood normal.         Behavior: Behavior normal.         Assessment / Plan     Assessment/Plan:   Problem List Items Addressed This Visit        Gastrointestinal Abdominal     Gastroesophageal reflux disease without esophagitis - Primary    Relevant Medications    pantoprazole (Protonix) 40 MG EC tablet    famotidine (Pepcid) 20 MG tablet        -- change to PPI to protonix. pepcid at night for 2 weeks then prn. - Avoid eating spicy foods, Avoid caffeinated beverages, Avoid carbonated beverage, Do not eat or drink within 2-3 hours of going to bed in the evening, Elevate head of bed on 4-6 inch blocks, Eat smaller portions of food throughout the day    Follow up:  Return 6-8 weeks, for F/U Dr. Bass. f/u gerd    Electronically signed by SHER Lynn   06/15/2021 13:33 EDT      Please note that portions of this note may have been completed with a voice recognition program. Efforts were made to edit the dictations, but occasionally words are mistranscribed.

## 2021-06-15 NOTE — PATIENT INSTRUCTIONS
Food Choices for Gastroesophageal Reflux Disease, Adult  When you have gastroesophageal reflux disease (GERD), the foods you eat and your eating habits are very important. Choosing the right foods can help ease your discomfort. Think about working with a food expert (dietitian) to help you make good choices.  What are tips for following this plan?  Reading food labels  · Read the label for foods that are low in saturated fat. Foods that may help with your symptoms include:  ? Foods that have less than 5% of daily value (DV) of fat.  ? Foods that have 0 grams of trans fat.  Cooking  · Do not vasquez your food. Cook your food by baking, steaming, grilling, or broiling. These are all methods that do not need a lot of fat for cooking.  · To add flavor, try to use herbs that are low in spice and acidity.  Meal planning    · Choose healthy foods that are low in fat, such as:  ? Fruits and vegetables.  ? Whole grains.  ? Low-fat dairy products.  ? Lean meats, fish, and poultry.  · Eat small meals often instead of eating 3 large meals each day. Eat your meals slowly in a place where you are relaxed. Avoid bending over or lying down until 2-3 hours after eating.  · Limit high-fat foods such as fatty meats or fried foods.  · Limit your intake of oils, butter, and shortening to less than 8 teaspoons each day.  · Avoid the following:  ? Foods that cause symptoms. These may be different for different people. Keep a food diary to keep track of foods that cause symptoms.  ? Alcohol.  ? Drinking a lot of liquid with meals.  ? Eating meals during the 2-3 hours before bed.  Lifestyle  · Stay at a healthy weight. Ask your doctor what weight is healthy for you. If you need to lose weight, work with your doctor to do so safely.  · Exercise for at least 30 minutes on 5 or more days each week, or as told by your doctor.  · Wear loose-fitting clothes.  · Do not use any products that contain nicotine or tobacco, such as cigarettes,  e-cigarettes, and chewing tobacco. If you need help quitting, ask your doctor.  · Sleep with the head of your bed higher than your feet. Use a wedge under the mattress or blocks under the bed frame to raise the head of the bed.  What foods should eat?    Eat a healthy, well-balanced diet of fruits, vegetables, whole grains, low-fat dairy products, lean meats, fish, and poultry. Each person is different. Foods that may cause symptoms in one person may not cause any symptoms in another person. Work with your doctor to find foods that are safe for you.  The items listed above may not be a complete list of foods and beverages you can eat. Contact a dietitian for more information.  What foods should I avoid?  Limiting some of these foods may help in managing the symptoms of GERD. Everyone is different. Talk with a food expert or your doctor to help you find the exact foods to avoid, if any.  Fruits  Any fruits prepared with added fat. Any fruits that cause symptoms. For some people, this may include citrus fruits, such as oranges, grapefruit, pineapple, and yolanda.  Vegetables  Deep-fried vegetables. French fries. Any vegetables prepared with added fat. Any vegetables that cause symptoms. For some people, this may include tomatoes and tomato products, chili peppers, onions and garlic, and horseradish.  Grains  Pastries or quick breads with added fat.  Meats and other proteins  High-fat meats, such as fatty beef or pork, hot dogs, ribs, ham, sausage, salami, and molina. Fried meat or protein, including fried fish and fried chicken. Nuts and nut butters.  Dairy  Whole milk and chocolate milk. Sour cream. Cream. Ice cream. Cream cheese. Milkshakes.  Fats and oils  Butter. Margarine. Shortening. Ghee.  Beverages  Coffee and tea, with or without caffeine. Carbonated beverages. Sodas. Energy drinks. Fruit juice made with acidic fruits (such as orange or grapefruit). Tomato juice. Alcoholic drinks.  Sweets and  desserts  Chocolate and cocoa. Donuts.  Seasonings and condiments  Pepper. Peppermint and spearmint. Added salt. Any condiments, herbs, or seasonings that cause symptoms. For some people, this may include castillo, hot sauce, or vinegar-based salad dressings.  The items listed above may not be a complete list of foods and beverages you should avoid. Contact a dietitian for more information.  Questions to ask your doctor  Diet and lifestyle changes are often the first steps that are taken to manage symptoms of GERD. If diet and lifestyle changes do not help, talk with your doctor about taking medicines.  Where to find more information  · [employer]: aboutgerd.org  Summary  · When you have GERD, food and lifestyle choices are very important in easing your symptoms.  · Eat small meals often instead of 3 large meals a day. Eat your meals slowly and in a place where you are relaxed.  · Avoid bending over or lying down until 2-3 hours after eating.  · Limit high-fat foods such as fatty meat or fried foods.  This information is not intended to replace advice given to you by your health care provider. Make sure you discuss any questions you have with your health care provider.  Document Revised: 10/12/2020 Document Reviewed: 10/12/2020  Elsevier Patient Education © 2021 [employer]

## 2021-07-12 ENCOUNTER — TELEPHONE (OUTPATIENT)
Dept: OBSTETRICS AND GYNECOLOGY | Facility: CLINIC | Age: 41
End: 2021-07-12

## 2021-07-12 RX ORDER — LEVONORGESTREL AND ETHINYL ESTRADIOL 100-20(84)
1 KIT ORAL DAILY
Qty: 91 TABLET | Refills: 0 | Status: CANCELLED | OUTPATIENT
Start: 2021-07-12

## 2021-07-12 NOTE — TELEPHONE ENCOUNTER
Patient has not been seen for almost 2 years. Even though she has appt scheduled will need to be seen before refills. Thanks

## 2021-07-12 NOTE — TELEPHONE ENCOUNTER
----- Message from Lula Polk sent at 7/12/2021 11:27 AM EDT -----  Pt is agnes'd with Ora on 08-13 for her annual.     She requested her birth control refill.    RX: [facility]

## 2021-08-13 ENCOUNTER — OFFICE VISIT (OUTPATIENT)
Dept: OBSTETRICS AND GYNECOLOGY | Facility: CLINIC | Age: 41
End: 2021-08-13

## 2021-08-13 VITALS
SYSTOLIC BLOOD PRESSURE: 134 MMHG | DIASTOLIC BLOOD PRESSURE: 80 MMHG | BODY MASS INDEX: 28.8 KG/M2 | HEIGHT: 66 IN | WEIGHT: 179.2 LBS

## 2021-08-13 DIAGNOSIS — Z01.419 ENCOUNTER FOR GYNECOLOGICAL EXAMINATION WITHOUT ABNORMAL FINDING: Primary | ICD-10-CM

## 2021-08-13 DIAGNOSIS — Z12.31 ENCOUNTER FOR SCREENING MAMMOGRAM FOR MALIGNANT NEOPLASM OF BREAST: ICD-10-CM

## 2021-08-13 DIAGNOSIS — Z12.4 SCREENING FOR CERVICAL CANCER: ICD-10-CM

## 2021-08-13 PROCEDURE — 99396 PREV VISIT EST AGE 40-64: CPT | Performed by: PHYSICIAN ASSISTANT

## 2021-08-13 RX ORDER — LEVONORGESTREL AND ETHINYL ESTRADIOL 100-20(84)
1 KIT ORAL DAILY
Qty: 91 TABLET | Refills: 3 | Status: SHIPPED | OUTPATIENT
Start: 2021-08-13 | End: 2022-09-15 | Stop reason: SDUPTHER

## 2021-08-13 NOTE — PROGRESS NOTES
"Subjective   Chief Complaint   Patient presents with   • Gynecologic Exam     Last pap done 19-WNL, MMG is due, requesting a refill on birth control, No complaints       Vielka Jennings is a 41 y.o. year old  presenting to be seen for her annual gynecological exam.   No complaints or concerns  Desires refills of Seasonique ocp. Usually does not have bleed on ocp  Would like screening mammogram [facility] ([place])     Past Medical History:   Diagnosis Date   • Abnormal Pap smear of cervix    • Acid reflux    • Body piercing     EARS   • Cough     REPORTS SECONDARY TO SEASONAL ALLERGIES. REPORTS BEGAN AROUND 19. REPORTS NO FEVER OR PRODUCTION OF COUGH.   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 2016   • Heart murmur     REPORTS \"MURMUR AS A BABY\"   • IBS (irritable bowel syndrome)     REPORTS PAST HISTORY, NO CURRENT ISSUES   • Irritable bowel syndrome with diarrhea 6/3/2016   • Port-wine stain    • Seasonal allergies         Current Outpatient Medications:   •  famotidine (Pepcid) 20 MG tablet, Take 1 tablet by mouth At Night As Needed for Heartburn., Disp: 30 tablet, Rfl: 2  •  Levonorgest-Eth Estrad -Day 0.1-0.02 & 0.01 MG tablet, Take 1 tablet by mouth Daily., Disp: 91 tablet, Rfl: 3  •  pantoprazole (Protonix) 40 MG EC tablet, Take 1 tablet by mouth Every Morning., Disp: 90 tablet, Rfl: 3   Allergies   Allergen Reactions   • Levaquin [Levofloxacin] Hives   • Sulfa Antibiotics Hives   • Zithromax [Azithromycin] Diarrhea      Past Surgical History:   Procedure Laterality Date   • ANKLE SURGERY Left     REPORTS LIGAMENT REPAIR, THEN LATER HAD STITCHES REMOVED (1 YEAR POST OP)   • CERVICAL BIOPSY  W/ LOOP ELECTRODE EXCISION     • CHOLECYSTECTOMY     • DILATATION AND CURETTAGE     • ENDOSCOPY     • HYSTEROSCOPY ENDOMETRIAL ABLATION     • LEEP N/A 2018    Procedure: LOOP ELECTROCAUTERY EXCISION PROCEDURE;  Surgeon: Candy Knight MD;  Location: [facility];  Service: Obstetrics/Gynecology " "  • OTHER SURGICAL HISTORY      AFTER DELIVERY OF DAUGHTER REPORTS SURGICAL INTERVENTION FROM \"TEAR THAT WAS INTERNAL\"   • SKIN BIOPSY        Social History     Socioeconomic History   • Marital status:      Spouse name: Not on file   • Number of children: Not on file   • Years of education: Not on file   • Highest education level: Not on file   Tobacco Use   • Smoking status: Current Every Day Smoker     Packs/day: 0.50     Years: 18.00     Pack years: 9.00     Types: Cigarettes     Start date: 2000   • Smokeless tobacco: Never Used   Vaping Use   • Vaping Use: Never used   Substance and Sexual Activity   • Alcohol use: No   • Drug use: No   • Sexual activity: Defer     Birth control/protection: OCP      Family History   Problem Relation Age of Onset   • Cancer Mother    • Hyperlipidemia Mother    • Melanoma Mother    • Hyperlipidemia Father    • Hypertension Father    • Hyperlipidemia Brother    • Hypertension Brother    • Thyroid disease Maternal Aunt    • Hyperlipidemia Maternal Grandmother    • Hyperlipidemia Maternal Grandfather    • Heart attack Paternal Grandmother    • Hyperlipidemia Paternal Grandmother    • Heart attack Paternal Grandfather    • Hyperlipidemia Paternal Grandfather        Review of Systems   Constitutional: Negative for chills, diaphoresis and fever.   Gastrointestinal: Negative.    Genitourinary: Negative for difficulty urinating, dysuria, menstrual problem and pelvic pain.           Objective   /80   Ht 167.6 cm (66\")   Wt 81.3 kg (179 lb 3.2 oz)   Breastfeeding No   BMI 28.92 kg/m²     Physical Exam  Constitutional:       Appearance: Normal appearance. She is well-developed and well-groomed.   Eyes:      General: Lids are normal.      Extraocular Movements: Extraocular movements intact.      Conjunctiva/sclera: Conjunctivae normal.   Neck:      Thyroid: No thyroid mass or thyromegaly.   Chest:      Breasts: Breasts are symmetrical.         Right: No inverted nipple, " mass, nipple discharge, skin change or tenderness.         Left: No inverted nipple, mass, nipple discharge, skin change or tenderness.   Abdominal:      General: There is no distension.      Palpations: Abdomen is soft. There is no hepatomegaly or splenomegaly.      Tenderness: There is no abdominal tenderness.   Genitourinary:     Exam position: Lithotomy position.      Labia:         Right: No rash, tenderness or lesion.         Left: No rash, tenderness or lesion.       Urethra: No prolapse, urethral pain, urethral swelling or urethral lesion.      Vagina: No vaginal discharge, tenderness or lesions.      Cervix: No cervical motion tenderness, discharge, friability or lesion.      Uterus: Not enlarged and not tender.       Adnexa:         Right: No mass or tenderness.          Left: No mass or tenderness.     Musculoskeletal:      Cervical back: Neck supple.   Lymphadenopathy:      Upper Body:      Right upper body: No axillary adenopathy.      Left upper body: No axillary adenopathy.   Skin:     General: Skin is warm and dry.      Findings: No lesion.   Neurological:      General: No focal deficit present.      Mental Status: She is alert and oriented to person, place, and time.   Psychiatric:         Attention and Perception: Attention normal.         Mood and Affect: Mood normal.         Speech: Speech normal.         Behavior: Behavior normal.         Thought Content: Thought content normal.            Result Review :                   Assessment and Plan  Diagnoses and all orders for this visit:    1. Encounter for gynecological examination without abnormal finding (Primary)    2. Screening for cervical cancer  -     Pap IG, HPV-hr; Future    3. Encounter for screening mammogram for malignant neoplasm of breast  -     Mammo Screening Digital Tomosynthesis Bilateral With CAD; Future    Other orders  -     Levonorgest-Eth Estrad 91-Day 0.1-0.02 & 0.01 MG tablet; Take 1 tablet by mouth Daily.  Dispense: 91  tablet; Refill: 3      Patient Instructions   Self breast exam monthly  Regular exercise                This note was electronically signed.    Sarah Biggs PA-C   August 13, 2021

## 2021-08-27 ENCOUNTER — OFFICE VISIT (OUTPATIENT)
Dept: FAMILY MEDICINE CLINIC | Facility: CLINIC | Age: 41
End: 2021-08-27

## 2021-08-27 VITALS
SYSTOLIC BLOOD PRESSURE: 128 MMHG | DIASTOLIC BLOOD PRESSURE: 80 MMHG | TEMPERATURE: 97.9 F | HEART RATE: 79 BPM | HEIGHT: 66 IN | WEIGHT: 179 LBS | BODY MASS INDEX: 28.77 KG/M2 | OXYGEN SATURATION: 98 %

## 2021-08-27 DIAGNOSIS — K21.9 GASTROESOPHAGEAL REFLUX DISEASE WITHOUT ESOPHAGITIS: ICD-10-CM

## 2021-08-27 DIAGNOSIS — R10.11 ABDOMINAL PAIN, RIGHT UPPER QUADRANT: Primary | ICD-10-CM

## 2021-08-27 PROCEDURE — 99213 OFFICE O/P EST LOW 20 MIN: CPT | Performed by: FAMILY MEDICINE

## 2021-08-27 RX ORDER — FAMOTIDINE 20 MG/1
20 TABLET, FILM COATED ORAL NIGHTLY PRN
Qty: 30 TABLET | Refills: 2 | Status: SHIPPED | OUTPATIENT
Start: 2021-08-27 | End: 2021-09-10

## 2021-08-27 NOTE — PROGRESS NOTES
Established Patient        Chief Complaint:   Chief Complaint   Patient presents with   • Follow-up     GERD; patient doing well with current medications; c/o right upper quad pain x several months intermittenty; would like to discuss smoking cessation        Vielka Jennings is a 41 y.o. female    History of Present Illness:   Answers for HPI/ROS submitted by the patient on 8/27/2021  Please describe your symptoms.: Follow up on stomach meds, Right side pain, Discuss options to quit smoking  Have you had these symptoms before?: Yes  How long have you been having these symptoms?: Greater than 2 weeks  Please list any medications you are currently taking for this condition.: Protonix pepcid  What is the primary reason for your visit?: Other    Patient is here today in scheduled follow-up visit of her GERD.  Patient states her reflux symptoms have improved considerably, however she continues to have periodic right upper quadrant and epigastric abdominal discomfort.  Denies any hematuria or dysuria.  Denies any BRB/BTS.  Denies aspiration or dysphagia.       Subjective     The following portions of the patient's history were reviewed and updated as appropriate: allergies, current medications, past family history, past medical history, past social history, past surgical history and problem list.    Allergies   Allergen Reactions   • Levaquin [Levofloxacin] Hives   • Sulfa Antibiotics Hives   • Zithromax [Azithromycin] Diarrhea       Review of Systems  1. Constitutional: Negative for fever. Negative for chills, diaphoresis, fatigue and unexpected weight change.   2. HENT: No dysphagia; no changes to vision/hearing/smell/taste; no epistaxis.  As per above otherwise.  3. Eyes: Negative for redness and visual disturbance.   4. Respiratory: Cough as per above.  5. Cardiovascular: Negative for chest pain and palpitations.   6. Gastrointestinal: As per above.  7. Endocrine: Negative for cold intolerance and heat  "intolerance.   8. Genitourinary: Mild burning with urination, increased frequency.  No hematuria.  9. Musculoskeletal: Negative for arthralgias, back pain and myalgias.   10. Skin: Negative for color change, rash and wound.   11. Neurological: Negative for syncope, weakness and headaches.   12. Hematological: Negative for adenopathy. Does not bruise/bleed easily.   13. Psychiatric/Behavioral: Negative for confusion. The patient is not nervous/anxious.    Objective     Physical Exam   Vital Signs: /80   Pulse 79   Temp 97.9 °F (36.6 °C)   Ht 167.6 cm (66\")   Wt 81.2 kg (179 lb)   SpO2 98%   BMI 28.89 kg/m²     General Appearance: alert, oriented x 3, no acute distress.  Skin: warm and dry.  Known port wine stain to the right face area  HEENT: Atraumatic.  pupils round and reactive to light and accommodation, oral mucosa pink and moist.  Nares patent without epistaxis.  External auditory canals are patent tympanic membranes intact.  Boggy/inflamed nasal turbinates bilaterally with maxillary tenderness on palpation and mucus on entrance to sinuses.  Serous effusion noted to the left tympanic membrane with decreased mobility of Valsalva and insufflation.  Moderate postnasal drainage without pustules or exudate.  Neck: supple, no JVD, trachea midline.  No thyromegaly  Lungs: CTA, unlabored breathing effort.  Heart: RRR, normal S1 and S2, no S3, no rub.  Abdomen: soft, non-tender, no palpable bladder, present bowel sounds to auscultation ×4.  No guarding or rigidity.  No CVA tenderness.  Extremities: no clubbing, cyanosis or edema.  Good range of motion actively and passively.  Symmetric muscle strength and development  Neuro: normal speech and mental status.  Cranial nerves II through XII intact.  No anosmia. DTR 2+; proprioception intact.  No focal motor/sensory deficits.    Assessment and Plan      Assessment:   Diagnoses and all orders for this visit:    1. Abdominal pain, right upper quadrant (Primary)  - "     H. Pylori Breath Test - Breath, Lung  -     CBC & Differential  -     Comprehensive Metabolic Panel    2. Gastroesophageal reflux disease without esophagitis  -     famotidine (Pepcid) 20 MG tablet; Take 1 tablet by mouth At Night As Needed for Heartburn.  Dispense: 30 tablet; Refill: 2  -     H. Pylori Breath Test - Breath, Lung        Plan:  Anti - reflux measures, trigger foods and drinks to avoid: Fatty foods, alcohol, chocolate, coffee, tea, caffeinated soft drinks (decaffeinated coffee still has some caffeine), peppermint and spearmint, spices and vinegar, citrus fruits and juices, tomatoes and tomato sauces, and smoking. Other antireflux measures include weight reduction if overweight, avoid tight clothing around the abdomen, elevate the head of her bed 6 inches (May use a bed wedge which is placed between the mattress in box Dovray) or blocks under the head of the bed, don't drink or eat for 2 hours before going to bed and avoid lying down immediately after meals.    Continue pepcid and protonix; will order urea breath test; consider referral to surgery for eval; may benefit from upper endoscopy vs. Exp lap to eval for adhesions.    Surveillance CMP/CBC.    Vital signs demonstrate hemodynamic stability, blood pressure is at goal.  Discussion Summary:    Discussed plan of care in detail with pt today; pt verb understanding and agrees.    I spent 25 minutes caring for Vielka on this date of service. This time includes time spent by me in the following activities:preparing for the visit, performing a medically appropriate examination and/or evaluation , counseling and educating the patient/family/caregiver, ordering medications, tests, or procedures, documenting information in the medical record and care coordination    I have reviewed and updated all copied forward information, as appropriate.  I attest to the accuracy and relevance of any unchanged information.    Follow up:  No follow-ups on file.      There are no Patient Instructions on file for this visit.    Patricio Bass,   08/27/21  08:44 EDT    Please note that portions of this note may have been completed with a voice recognition program. Efforts were made to edit the dictations, but occasionally words are mistranscribed.

## 2021-08-28 LAB
ALBUMIN SERPL-MCNC: 4.6 G/DL (ref 3.5–5.2)
ALBUMIN/GLOB SERPL: 2.2 G/DL
ALP SERPL-CCNC: 104 U/L (ref 39–117)
ALT SERPL-CCNC: 16 U/L (ref 1–33)
AST SERPL-CCNC: 19 U/L (ref 1–32)
BASOPHILS # BLD AUTO: 0.04 10*3/MM3 (ref 0–0.2)
BASOPHILS NFR BLD AUTO: 0.5 % (ref 0–1.5)
BILIRUB SERPL-MCNC: 0.3 MG/DL (ref 0–1.2)
BUN SERPL-MCNC: 9 MG/DL (ref 6–20)
BUN/CREAT SERPL: 13.4 (ref 7–25)
CALCIUM SERPL-MCNC: 9.5 MG/DL (ref 8.6–10.5)
CHLORIDE SERPL-SCNC: 103 MMOL/L (ref 98–107)
CO2 SERPL-SCNC: 24.7 MMOL/L (ref 22–29)
CREAT SERPL-MCNC: 0.67 MG/DL (ref 0.57–1)
EOSINOPHIL # BLD AUTO: 0 10*3/MM3 (ref 0–0.4)
EOSINOPHIL NFR BLD AUTO: 0 % (ref 0.3–6.2)
ERYTHROCYTE [DISTWIDTH] IN BLOOD BY AUTOMATED COUNT: 12.5 % (ref 12.3–15.4)
GLOBULIN SER CALC-MCNC: 2.1 GM/DL
GLUCOSE SERPL-MCNC: 79 MG/DL (ref 65–99)
HCT VFR BLD AUTO: 43.8 % (ref 34–46.6)
HGB BLD-MCNC: 14.6 G/DL (ref 12–15.9)
IMM GRANULOCYTES # BLD AUTO: 0.04 10*3/MM3 (ref 0–0.05)
IMM GRANULOCYTES NFR BLD AUTO: 0.5 % (ref 0–0.5)
LYMPHOCYTES # BLD AUTO: 1.85 10*3/MM3 (ref 0.7–3.1)
LYMPHOCYTES NFR BLD AUTO: 24.8 % (ref 19.6–45.3)
MCH RBC QN AUTO: 29.5 PG (ref 26.6–33)
MCHC RBC AUTO-ENTMCNC: 33.3 G/DL (ref 31.5–35.7)
MCV RBC AUTO: 88.5 FL (ref 79–97)
MONOCYTES # BLD AUTO: 0.59 10*3/MM3 (ref 0.1–0.9)
MONOCYTES NFR BLD AUTO: 7.9 % (ref 5–12)
NEUTROPHILS # BLD AUTO: 4.94 10*3/MM3 (ref 1.7–7)
NEUTROPHILS NFR BLD AUTO: 66.3 % (ref 42.7–76)
NRBC BLD AUTO-RTO: 0 /100 WBC (ref 0–0.2)
PLATELET # BLD AUTO: 355 10*3/MM3 (ref 140–450)
POTASSIUM SERPL-SCNC: 4.7 MMOL/L (ref 3.5–5.2)
PROT SERPL-MCNC: 6.7 G/DL (ref 6–8.5)
RBC # BLD AUTO: 4.95 10*6/MM3 (ref 3.77–5.28)
SODIUM SERPL-SCNC: 139 MMOL/L (ref 136–145)
UREA BREATH TEST QL: NEGATIVE
WBC # BLD AUTO: 7.46 10*3/MM3 (ref 3.4–10.8)

## 2021-08-30 ENCOUNTER — TELEPHONE (OUTPATIENT)
Dept: FAMILY MEDICINE CLINIC | Facility: CLINIC | Age: 41
End: 2021-08-30

## 2021-08-30 NOTE — TELEPHONE ENCOUNTER
Caller: Vielka Jennings    Relationship: Self    Best call back number: [phone]    Caller requesting test results:     What test was performed: labs    When was the test performed: 08/27/21    Where was the test performed: in office    Additional notes:

## 2021-08-30 NOTE — TELEPHONE ENCOUNTER
I CALLED AND SPOKE WITH THE PATIENT. SHE HAS LOOKED AT HER RESULTS, BUT SHE WOULD LIKE TO KNOW WHAT THE EOSINOPHIL REL % MEANS. SHE STATES THAT IT IS 0.0/

## 2021-09-07 DIAGNOSIS — Z12.4 SCREENING FOR CERVICAL CANCER: ICD-10-CM

## 2021-09-10 DIAGNOSIS — K21.9 GASTROESOPHAGEAL REFLUX DISEASE WITHOUT ESOPHAGITIS: ICD-10-CM

## 2021-09-10 RX ORDER — FAMOTIDINE 20 MG/1
20 TABLET, FILM COATED ORAL NIGHTLY PRN
Qty: 30 TABLET | Refills: 2 | Status: SHIPPED | OUTPATIENT
Start: 2021-09-10 | End: 2022-03-03

## 2021-10-07 ENCOUNTER — HOSPITAL ENCOUNTER (OUTPATIENT)
Dept: MAMMOGRAPHY | Facility: HOSPITAL | Age: 41
Discharge: HOME | End: 2021-10-07
Admitting: PHYSICIAN ASSISTANT

## 2021-10-07 DIAGNOSIS — Z12.31 ENCOUNTER FOR SCREENING MAMMOGRAM FOR MALIGNANT NEOPLASM OF BREAST: ICD-10-CM

## 2021-10-07 PROCEDURE — 77063 BREAST TOMOSYNTHESIS BI: CPT

## 2021-10-07 PROCEDURE — 77067 SCR MAMMO BI INCL CAD: CPT

## 2022-03-03 ENCOUNTER — PRIOR AUTHORIZATION (OUTPATIENT)
Dept: FAMILY MEDICINE CLINIC | Facility: CLINIC | Age: 42
End: 2022-03-03

## 2022-03-03 ENCOUNTER — OFFICE VISIT (OUTPATIENT)
Dept: FAMILY MEDICINE CLINIC | Facility: CLINIC | Age: 42
End: 2022-03-03

## 2022-03-03 VITALS
DIASTOLIC BLOOD PRESSURE: 70 MMHG | WEIGHT: 181.6 LBS | TEMPERATURE: 98.2 F | HEART RATE: 77 BPM | RESPIRATION RATE: 16 BRPM | SYSTOLIC BLOOD PRESSURE: 122 MMHG | BODY MASS INDEX: 29.18 KG/M2 | HEIGHT: 66 IN | OXYGEN SATURATION: 98 %

## 2022-03-03 DIAGNOSIS — E55.9 VITAMIN D DEFICIENCY: ICD-10-CM

## 2022-03-03 DIAGNOSIS — Z00.00 ROUTINE GENERAL MEDICAL EXAMINATION AT A HEALTH CARE FACILITY: Primary | ICD-10-CM

## 2022-03-03 DIAGNOSIS — F17.200 TOBACCO USE DISORDER: ICD-10-CM

## 2022-03-03 DIAGNOSIS — K21.9 GASTROESOPHAGEAL REFLUX DISEASE WITHOUT ESOPHAGITIS: ICD-10-CM

## 2022-03-03 DIAGNOSIS — E53.8 VITAMIN B12 DEFICIENCY: ICD-10-CM

## 2022-03-03 PROCEDURE — 99396 PREV VISIT EST AGE 40-64: CPT | Performed by: FAMILY MEDICINE

## 2022-03-03 RX ORDER — PANTOPRAZOLE SODIUM 40 MG/1
40 TABLET, DELAYED RELEASE ORAL EVERY MORNING
Qty: 90 TABLET | Refills: 3 | OUTPATIENT
Start: 2022-03-03 | End: 2022-03-24

## 2022-03-03 RX ORDER — VARENICLINE TARTRATE 1 MG/1
1 TABLET, FILM COATED ORAL 2 TIMES DAILY
Qty: 56 TABLET | Refills: 1 | OUTPATIENT
Start: 2022-03-31 | End: 2022-03-24

## 2022-03-03 NOTE — TELEPHONE ENCOUNTER
A PRIOR AUTH HAS BEEN STARTED THROUGH [employer] FOR PANTOPRAZOLE.    CURRENTLY WAITING ON A RESPONSE FROM THE INSURANCE.    KEY: RICHIE

## 2022-03-03 NOTE — PATIENT INSTRUCTIONS
Preventive Care 21-39 Years Old, Female  Preventive care refers to lifestyle choices and visits with your health care provider that can promote health and wellness. This includes:  · A yearly physical exam. This is also called an annual wellness visit.  · Regular dental and eye exams.  · Immunizations.  · Screening for certain conditions.  · Healthy lifestyle choices, such as:  ? Eating a healthy diet.  ? Getting regular exercise.  ? Not using drugs or products that contain nicotine and tobacco.  ? Limiting alcohol use.  What can I expect for my preventive care visit?  Physical exam  Your health care provider may check your:  · Height and weight. These may be used to calculate your BMI (body mass index). BMI is a measurement that tells if you are at a healthy weight.  · Heart rate and blood pressure.  · Body temperature.  · Skin for abnormal spots.  Counseling  Your health care provider may ask you questions about your:  · Past medical problems.  · Family's medical history.  · Alcohol, tobacco, and drug use.  · Emotional well-being.  · Home life and relationship well-being.  · Sexual activity.  · Diet, exercise, and sleep habits.  · Work and work environment.  · Access to firearms.  · Method of birth control.  · Menstrual cycle.  · Pregnancy history.  What immunizations do I need?    Vaccines are usually given at various ages, according to a schedule. Your health care provider will recommend vaccines for you based on your age, medical history, and lifestyle or other factors, such as travel or where you work.  What tests do I need?    Blood tests  · Lipid and cholesterol levels. These may be checked every 5 years starting at age 20.  · Hepatitis C test.  · Hepatitis B test.  Screening  · Diabetes screening. This is done by checking your blood sugar (glucose) after you have not eaten for a while (fasting).  · STD (sexually transmitted disease) testing, if you are at risk.  · BRCA-related cancer screening. This may  be done if you have a family history of breast, ovarian, tubal, or peritoneal cancers.  · Pelvic exam and Pap test. This may be done every 3 years starting at age 21. Starting at age 30, this may be done every 5 years if you have a Pap test in combination with an HPV test.  Talk with your health care provider about your test results, treatment options, and if necessary, the need for more tests.  Follow these instructions at home:  Eating and drinking    · Eat a healthy diet that includes fresh fruits and vegetables, whole grains, lean protein, and low-fat dairy products.  · Take vitamin and mineral supplements as recommended by your health care provider.  · Do not drink alcohol if:  ? Your health care provider tells you not to drink.  ? You are pregnant, may be pregnant, or are planning to become pregnant.  · If you drink alcohol:  ? Limit how much you have to 0-1 drink a day.  ? Be aware of how much alcohol is in your drink. In the [place] one drink equals one 12 oz bottle of beer (355 mL), one 5 oz glass of wine (148 mL), or one 1½ oz glass of hard liquor (44 mL).    Lifestyle  · Take daily care of your teeth and gums. Brush your teeth every morning and night with fluoride toothpaste. Floss one time each day.  · Stay active. Exercise for at least 30 minutes 5 or more days each week.  · Do not use any products that contain nicotine or tobacco, such as cigarettes, e-cigarettes, and chewing tobacco. If you need help quitting, ask your health care provider.  · Do not use drugs.  · If you are sexually active, practice safe sex. Use a condom or other form of birth control (contraception) in order to prevent pregnancy and STIs (sexually transmitted infections). If you plan to become pregnant, see your health care provider for a pre-conception visit.  · Find healthy ways to cope with stress, such as:  ? Meditation, yoga, or listening to music.  ? Journaling.  ? Talking to a trusted person.  ? Spending time with friends and  family.  Safety  · Always wear your seat belt while driving or riding in a vehicle.  · Do not drive:  ? If you have been drinking alcohol. Do not ride with someone who has been drinking.  ? When you are tired or distracted.  ? While texting.  · Wear a helmet and other protective equipment during sports activities.  · If you have firearms in your house, make sure you follow all gun safety procedures.  · Seek help if you have been physically or sexually abused.  What's next?  · Go to your health care provider once a year for an annual wellness visit.  · Ask your health care provider how often you should have your eyes and teeth checked.  · Stay up to date on all vaccines.  This information is not intended to replace advice given to you by your health care provider. Make sure you discuss any questions you have with your health care provider.  Document Revised: 09/02/2020 Document Reviewed: 08/29/2019  [employer] Patient Education © 2021 [employer]

## 2022-03-03 NOTE — PROGRESS NOTES
Established Patient        Chief Complaint:   Chief Complaint   Patient presents with   • Annual Exam/Follow-up        Vielka Jennings is a 41 y.o. female    History of Present Illness:   Here for annual/preventative health exam; also in f/u of GERD/Vit D and B12 def; wants to stop smoking, has not done well with bupropion in past, nor with self cessation.    No F/C or night sweats; no hematuria/dysuria; no CP/SOA/Palp/vertigo/syncope.     Has been out of pantoprazole for extended period of time; symptoms of reflux have worsened.    Occasional feelings of dysphagia to solids, occ liquids; very infrequent.  No BRB/BTS.    Maintains active lifestyle and balanced dietary intake.  Subjective     The following portions of the patient's history were reviewed and updated as appropriate: allergies, current medications, past family history, past medical history, past social history, past surgical history and problem list.    Allergies   Allergen Reactions   • Levaquin [Levofloxacin] Hives   • Sulfa Antibiotics Hives   • Zithromax [Azithromycin] Diarrhea       Review of Systems  1. Constitutional: Negative for fever. Negative for chills, diaphoresis, fatigue and unexpected weight change.   2. HENT: No dysphagia; no changes to vision/hearing/smell/taste; no epistaxis.   3. Eyes: Negative for redness and visual disturbance.   4. Respiratory: Cough as per above.  5. Cardiovascular: Negative for chest pain and palpitations.   6. Gastrointestinal: As per above.  7. Endocrine: Negative for cold intolerance and heat intolerance.   8. Genitourinary: Mild burning with urination, increased frequency.  No hematuria.  9. Musculoskeletal: Negative for arthralgias, back pain and myalgias.   10. Skin: Negative for color change, rash and wound.   11. Neurological: Negative for syncope, weakness and headaches.   12. Hematological: Negative for adenopathy. Does not bruise/bleed easily.   13. Psychiatric/Behavioral: Negative for  "confusion. The patient is not nervous/anxious.    Objective     Physical Exam   Vital Signs: /70   Pulse 77   Temp 98.2 °F (36.8 °C)   Resp 16   Ht 167.6 cm (66\")   Wt 82.4 kg (181 lb 9.6 oz)   SpO2 98%   BMI 29.31 kg/m²     General Appearance: alert, oriented x 3, no acute distress.  Skin: warm and dry.  Known port wine stain to the right face area  HEENT: Atraumatic.  pupils round and reactive to light and accommodation, oral mucosa pink and moist.  Nares patent without epistaxis.  External auditory canals are patent tympanic membranes intact.   Neck: supple, no JVD, trachea midline.  No thyromegaly  Lungs: CTA, unlabored breathing effort.  Heart: RRR, normal S1 and S2, no S3, no rub.  Abdomen: soft, non-tender, no palpable bladder, present bowel sounds to auscultation ×4.  No guarding or rigidity.  No CVA tenderness.  Extremities: no clubbing, cyanosis or edema.  Good range of motion actively and passively.  Symmetric muscle strength and development  Neuro: normal speech and mental status.  Cranial nerves II through XII intact.  No anosmia. DTR 2+; proprioception intact.  No focal motor/sensory deficits.    Advance Care Planning   ACP discussion was held with the patient during this visit. Patient does not have an advance directive, information provided.    Assessment and Plan      Assessment:   Diagnoses and all orders for this visit:    1. Routine general medical examination at a health care facility (Primary)  -     CBC & Differential  -     Lipid Panel  -     Comprehensive Metabolic Panel    2. Gastroesophageal reflux disease without esophagitis  -     pantoprazole (Protonix) 40 MG EC tablet; Take 1 tablet by mouth Every Morning.  Dispense: 90 tablet; Refill: 3    3. Vitamin D deficiency  -     Vitamin D 25 Hydroxy    4. Vitamin B12 deficiency  -     Vitamin B12  -     CBC & Differential    5. Tobacco use disorder  -     varenicline (CHANTIX RAIN) 0.5 MG X 11 & 1 MG X 42 tablet; Take 0.5 mg po " daily x 3 days, then 0.5 mg po bid x 4 days, then 1 mg po bid  Dispense: 53 tablet; Refill: 0  -     varenicline (Chantix Continuing Month [place]) 1 MG tablet; Take 1 tablet by mouth 2 (Two) Times a Day for 56 days.  Dispense: 56 tablet; Refill: 1        Plan:  Refilled pantoprazole today; continue dietary/lifestyle changes; if dysphagia worsens in severity or freq, will need to have EGD with possible dilatation.    VSS; BP at goal.    Counseled on numerous health conseq of tob use for a total of 5 minutes of exam time; pt verb understanding and agrees with need for cessation; pt chooses gradual cessation and Chantix; instructed pt to use meditation/exercise/prayer or any other stress reducing activities to assist; will follow clinically and assist pt as needed.    Surveillance labs today including CBC/CMP/lipid panel/vitamin B12/vitamin D.    I have discussed age/gender specific preventative healthcare issues in detail with patient today.  I have answered all of the questions.    Discussed need for reduction in sodium/salt/caffeine intake; improve sleep habits as able; inc formal CV exercise program with goal of vigorous activity most, if not all, days of the week; goal of 250 min of sustained HR CV exercise total per week.      Discussion Summary:    Discussed plan of care in detail with pt today; pt verb understanding and agrees.    I spent 35 minutes caring for Vielka on this date of service. This time includes time spent by me in the following activities:preparing for the visit, performing a medically appropriate examination and/or evaluation , counseling and educating the patient/family/caregiver, ordering medications, tests, or procedures, documenting information in the medical record and care coordination    I have reviewed and updated all copied forward information, as appropriate.  I attest to the accuracy and relevance of any unchanged information.    Follow up:  No follow-ups on file.     Patient Instructions        Preventive Care 21-39 Years Old, Female  Preventive care refers to lifestyle choices and visits with your health care provider that can promote health and wellness. This includes:  · A yearly physical exam. This is also called an annual wellness visit.  · Regular dental and eye exams.  · Immunizations.  · Screening for certain conditions.  · Healthy lifestyle choices, such as:  ? Eating a healthy diet.  ? Getting regular exercise.  ? Not using drugs or products that contain nicotine and tobacco.  ? Limiting alcohol use.  What can I expect for my preventive care visit?  Physical exam  Your health care provider may check your:  · Height and weight. These may be used to calculate your BMI (body mass index). BMI is a measurement that tells if you are at a healthy weight.  · Heart rate and blood pressure.  · Body temperature.  · Skin for abnormal spots.  Counseling  Your health care provider may ask you questions about your:  · Past medical problems.  · Family's medical history.  · Alcohol, tobacco, and drug use.  · Emotional well-being.  · Home life and relationship well-being.  · Sexual activity.  · Diet, exercise, and sleep habits.  · Work and work environment.  · Access to firearms.  · Method of birth control.  · Menstrual cycle.  · Pregnancy history.  What immunizations do I need?    Vaccines are usually given at various ages, according to a schedule. Your health care provider will recommend vaccines for you based on your age, medical history, and lifestyle or other factors, such as travel or where you work.  What tests do I need?    Blood tests  · Lipid and cholesterol levels. These may be checked every 5 years starting at age 20.  · Hepatitis C test.  · Hepatitis B test.  Screening  · Diabetes screening. This is done by checking your blood sugar (glucose) after you have not eaten for a while (fasting).  · STD (sexually transmitted disease) testing, if you are at risk.  · BRCA-related cancer screening. This  may be done if you have a family history of breast, ovarian, tubal, or peritoneal cancers.  · Pelvic exam and Pap test. This may be done every 3 years starting at age 21. Starting at age 30, this may be done every 5 years if you have a Pap test in combination with an HPV test.  Talk with your health care provider about your test results, treatment options, and if necessary, the need for more tests.  Follow these instructions at home:  Eating and drinking    · Eat a healthy diet that includes fresh fruits and vegetables, whole grains, lean protein, and low-fat dairy products.  · Take vitamin and mineral supplements as recommended by your health care provider.  · Do not drink alcohol if:  ? Your health care provider tells you not to drink.  ? You are pregnant, may be pregnant, or are planning to become pregnant.  · If you drink alcohol:  ? Limit how much you have to 0-1 drink a day.  ? Be aware of how much alcohol is in your drink. In the [place] one drink equals one 12 oz bottle of beer (355 mL), one 5 oz glass of wine (148 mL), or one 1½ oz glass of hard liquor (44 mL).    Lifestyle  · Take daily care of your teeth and gums. Brush your teeth every morning and night with fluoride toothpaste. Floss one time each day.  · Stay active. Exercise for at least 30 minutes 5 or more days each week.  · Do not use any products that contain nicotine or tobacco, such as cigarettes, e-cigarettes, and chewing tobacco. If you need help quitting, ask your health care provider.  · Do not use drugs.  · If you are sexually active, practice safe sex. Use a condom or other form of birth control (contraception) in order to prevent pregnancy and STIs (sexually transmitted infections). If you plan to become pregnant, see your health care provider for a pre-conception visit.  · Find healthy ways to cope with stress, such as:  ? Meditation, yoga, or listening to music.  ? Journaling.  ? Talking to a trusted person.  ? Spending time with friends  and family.  Safety  · Always wear your seat belt while driving or riding in a vehicle.  · Do not drive:  ? If you have been drinking alcohol. Do not ride with someone who has been drinking.  ? When you are tired or distracted.  ? While texting.  · Wear a helmet and other protective equipment during sports activities.  · If you have firearms in your house, make sure you follow all gun safety procedures.  · Seek help if you have been physically or sexually abused.  What's next?  · Go to your health care provider once a year for an annual wellness visit.  · Ask your health care provider how often you should have your eyes and teeth checked.  · Stay up to date on all vaccines.  This information is not intended to replace advice given to you by your health care provider. Make sure you discuss any questions you have with your health care provider.  Document Revised: 09/02/2020 Document Reviewed: 08/29/2019  [employer] Patient Education © 2021 [employer]        Patricio Bass DO  03/03/22  08:42 EST    Please note that portions of this note may have been completed with a voice recognition program. Efforts were made to edit the dictations, but occasionally words are mistranscribed.

## 2022-03-04 LAB
25(OH)D3+25(OH)D2 SERPL-MCNC: 12.2 NG/ML
ALBUMIN SERPL-MCNC: 4.3 G/DL (ref 3.5–5.2)
ALBUMIN/GLOB SERPL: 1.9 G/DL
ALP SERPL-CCNC: 95 U/L (ref 39–117)
ALT SERPL-CCNC: 10 U/L (ref 1–33)
AST SERPL-CCNC: 14 U/L (ref 1–32)
BASOPHILS # BLD AUTO: 0.04 10*3/MM3 (ref 0–0.2)
BASOPHILS NFR BLD AUTO: 0.5 % (ref 0–1.5)
BILIRUB SERPL-MCNC: 0.3 MG/DL (ref 0–1.2)
BUN SERPL-MCNC: 9 MG/DL (ref 6–20)
BUN/CREAT SERPL: 12.5 (ref 7–25)
CALCIUM SERPL-MCNC: 9.6 MG/DL (ref 8.6–10.5)
CHLORIDE SERPL-SCNC: 107 MMOL/L (ref 98–107)
CHOLEST SERPL-MCNC: 197 MG/DL (ref 0–200)
CO2 SERPL-SCNC: 22.3 MMOL/L (ref 22–29)
CREAT SERPL-MCNC: 0.72 MG/DL (ref 0.57–1)
EGFR GENE MUT ANL BLD/T: 108.6 ML/MIN/1.73
EOSINOPHIL # BLD AUTO: 0.4 10*3/MM3 (ref 0–0.4)
EOSINOPHIL NFR BLD AUTO: 5 % (ref 0.3–6.2)
ERYTHROCYTE [DISTWIDTH] IN BLOOD BY AUTOMATED COUNT: 13.7 % (ref 12.3–15.4)
GLOBULIN SER CALC-MCNC: 2.3 GM/DL
GLUCOSE SERPL-MCNC: 81 MG/DL (ref 65–99)
HCT VFR BLD AUTO: 42.3 % (ref 34–46.6)
HDLC SERPL-MCNC: 40 MG/DL (ref 40–60)
HGB BLD-MCNC: 13.9 G/DL (ref 12–15.9)
IMM GRANULOCYTES # BLD AUTO: 0.01 10*3/MM3 (ref 0–0.05)
IMM GRANULOCYTES NFR BLD AUTO: 0.1 % (ref 0–0.5)
LDLC SERPL CALC-MCNC: 137 MG/DL (ref 0–100)
LYMPHOCYTES # BLD AUTO: 1.98 10*3/MM3 (ref 0.7–3.1)
LYMPHOCYTES NFR BLD AUTO: 24.6 % (ref 19.6–45.3)
MCH RBC QN AUTO: 28.8 PG (ref 26.6–33)
MCHC RBC AUTO-ENTMCNC: 32.9 G/DL (ref 31.5–35.7)
MCV RBC AUTO: 87.6 FL (ref 79–97)
MONOCYTES # BLD AUTO: 0.49 10*3/MM3 (ref 0.1–0.9)
MONOCYTES NFR BLD AUTO: 6.1 % (ref 5–12)
NEUTROPHILS # BLD AUTO: 5.12 10*3/MM3 (ref 1.7–7)
NEUTROPHILS NFR BLD AUTO: 63.7 % (ref 42.7–76)
NRBC BLD AUTO-RTO: 0 /100 WBC (ref 0–0.2)
PLATELET # BLD AUTO: 357 10*3/MM3 (ref 140–450)
POTASSIUM SERPL-SCNC: 4.6 MMOL/L (ref 3.5–5.2)
PROT SERPL-MCNC: 6.6 G/DL (ref 6–8.5)
RBC # BLD AUTO: 4.83 10*6/MM3 (ref 3.77–5.28)
SODIUM SERPL-SCNC: 140 MMOL/L (ref 136–145)
TRIGL SERPL-MCNC: 108 MG/DL (ref 0–150)
VIT B12 SERPL-MCNC: 337 PG/ML (ref 211–946)
VLDLC SERPL CALC-MCNC: 20 MG/DL (ref 5–40)
WBC # BLD AUTO: 8.04 10*3/MM3 (ref 3.4–10.8)

## 2022-03-30 DIAGNOSIS — E55.9 VITAMIN D DEFICIENCY: Primary | ICD-10-CM

## 2022-03-30 DIAGNOSIS — E55.9 VITAMIN D DEFICIENCY: ICD-10-CM

## 2022-03-30 RX ORDER — ERGOCALCIFEROL 1.25 MG/1
50000 CAPSULE ORAL WEEKLY
Qty: 8 CAPSULE | Refills: 0 | Status: SHIPPED | OUTPATIENT
Start: 2022-03-30 | End: 2022-05-24

## 2022-03-31 RX ORDER — ERGOCALCIFEROL 1.25 MG/1
CAPSULE ORAL
Qty: 12 CAPSULE | OUTPATIENT
Start: 2022-03-31

## 2022-04-06 ENCOUNTER — PRIOR AUTHORIZATION (OUTPATIENT)
Dept: FAMILY MEDICINE CLINIC | Facility: CLINIC | Age: 42
End: 2022-04-06

## 2022-05-24 DIAGNOSIS — E55.9 VITAMIN D DEFICIENCY: ICD-10-CM

## 2022-05-24 RX ORDER — ERGOCALCIFEROL 1.25 MG/1
CAPSULE ORAL
Qty: 12 CAPSULE | Refills: 0 | Status: SHIPPED | OUTPATIENT
Start: 2022-05-24 | End: 2022-08-19

## 2022-05-24 NOTE — TELEPHONE ENCOUNTER
Rx Refill Note  Requested Prescriptions     Pending Prescriptions Disp Refills   • vitamin D (ERGOCALCIFEROL) 1.25 MG (99816 UT) capsule capsule [Pharmacy Med Name: VITAMIN D2 50,000IU (ERGO) CAP RX] 12 capsule      Sig: TAKE 1 CAPSULE BY MOUTH 1 TIME EVERY WEEK FOR 8 DOSES      Last office visit with prescribing clinician: 3/3/2022      Next office visit with prescribing clinician: 9/8/2022            Alexa Rincon MA  05/24/22, 08:30 EDT

## 2022-07-11 ENCOUNTER — TELEPHONE (OUTPATIENT)
Dept: FAMILY MEDICINE CLINIC | Facility: CLINIC | Age: 42
End: 2022-07-11

## 2022-07-11 NOTE — TELEPHONE ENCOUNTER
Caller: Vielka Jennings    Relationship: Self    Best call back number: [phone]  Requested Prescriptions:    PROTONIX  40MG    Pharmacy where request should be sent: [id number] - [place] [place] - [address] AT [address] - [phone]  - [phone] FX     Additional details provided by patient:   PT STATED THAT KATT BONILLA PRESCRIBED RX.  Does the patient have less than a 3 day supply:  [x] Yes  [] No    Filippo Reyes Rep   07/11/22 15:16 EDT

## 2022-07-12 DIAGNOSIS — K21.9 GASTROESOPHAGEAL REFLUX DISEASE WITHOUT ESOPHAGITIS: ICD-10-CM

## 2022-07-12 RX ORDER — PANTOPRAZOLE SODIUM 40 MG/1
40 TABLET, DELAYED RELEASE ORAL EVERY MORNING
Qty: 90 TABLET | Refills: 3 | Status: SHIPPED | OUTPATIENT
Start: 2022-07-12

## 2022-08-19 ENCOUNTER — OFFICE VISIT (OUTPATIENT)
Dept: FAMILY MEDICINE CLINIC | Facility: CLINIC | Age: 42
End: 2022-08-19

## 2022-08-19 VITALS
DIASTOLIC BLOOD PRESSURE: 70 MMHG | BODY MASS INDEX: 28.77 KG/M2 | SYSTOLIC BLOOD PRESSURE: 112 MMHG | HEART RATE: 95 BPM | WEIGHT: 179 LBS | HEIGHT: 66 IN | OXYGEN SATURATION: 100 % | TEMPERATURE: 97.6 F

## 2022-08-19 DIAGNOSIS — F41.9 ANXIETY: ICD-10-CM

## 2022-08-19 DIAGNOSIS — R00.2 PALPITATIONS: Primary | ICD-10-CM

## 2022-08-19 PROCEDURE — 99214 OFFICE O/P EST MOD 30 MIN: CPT | Performed by: NURSE PRACTITIONER

## 2022-08-19 PROCEDURE — 93000 ELECTROCARDIOGRAM COMPLETE: CPT | Performed by: NURSE PRACTITIONER

## 2022-08-19 RX ORDER — PROPRANOLOL HYDROCHLORIDE 10 MG/1
10 TABLET ORAL 2 TIMES DAILY
Qty: 30 TABLET | Refills: 1 | OUTPATIENT
Start: 2022-08-26 | End: 2022-08-31

## 2022-08-19 NOTE — PROGRESS NOTES
Established Patient        Chief Complaint:   Chief Complaint   Patient presents with   • Heart Problem     Patient feels like heart is skipping a beat         History of Present Illness:    Vielka Jennings is a 42 y.o. female who presents today for complaints of palpitations. Palpitations since . Woke her up out of a deep sleep. States it has done this before when she was nervous/anxious but this was the first time it did it out of nowhere.     Anxiety--not medicated because its just sporadic, denies increased stress, sleeping well, drinking lots of water    Feels like heart is pounding and can feel it up the side of her neck      of MI, had HLD, daughter has dilated cardiomyopathy  Subjective     The following portions of the patient's history were reviewed and updated as appropriate: allergies, current medications, past family history, past medical history, past social history, past surgical history and problem list.    ALLERGIES  Allergies   Allergen Reactions   • Levaquin [Levofloxacin] Hives   • Sulfa Antibiotics Hives   • Zithromax [Azithromycin] Diarrhea       ROS  Review of Systems  1. Constitutional: Negative for fever. Negative for chills, diaphoresis, fatigue and unexpected weight change.   2. HENT: No dysphagia; no changes to vision/hearing/smell/taste; no epistaxis  3. Eyes: Negative for redness and visual disturbance.   4. Respiratory: negative for shortness of breath. Negative for chest pain . Negative for cough and chest tightness.   5. Cardiovascular: palpitations  6. Gastrointestinal: Negative for abdominal distention, abdominal pain and blood in stool.   7. Endocrine: Negative for cold intolerance and heat intolerance.   8. Genitourinary: Negative for difficulty urinating, dysuria and frequency.   9. Musculoskeletal: Negative for arthralgias, back pain and myalgias.   10. Skin: Negative for color change, rash and wound.   11. Neurological: Negative for  "syncope, weakness and headaches.   12. Hematological: Negative for adenopathy. Does not bruise/bleed easily.   13. Psychiatric/Behavioral: Negative for confusion. The patient is not nervous/anxious.    Objective     Vital Signs:   /70 (BP Location: Right arm, Patient Position: Sitting, Cuff Size: Adult)   Pulse 95   Temp 97.6 °F (36.4 °C) (Temporal)   Ht 167.6 cm (66\")   Wt 81.2 kg (179 lb)   SpO2 100%   BMI 28.89 kg/m²     BMI is >= 25 and <30. (Overweight) The following options were offered after discussion;: exercise counseling/recommendations and nutrition counseling/recommendations    Physical Exam   Physical Exam  General Appearance: alert, oriented x 3, no acute distress.  Skin: warm and dry.   HEENT: Atraumatic.  pupils round and reactive to light and accommodation, oral mucosa pink and moist.  Nares patent without epistaxis.  External auditory canals are patent tympanic membranes intact.  Neck: supple, no JVD, trachea midline.  No thyromegaly  Lungs: CTA, unlabored breathing effort.  Heart: RRR, normal S1 and S2, no S3, no rub.  Abdomen: soft, non-tender, no palpable bladder, present bowel sounds to auscultation ×4.  No guarding or rigidity.  Extremities: no clubbing, cyanosis or edema.  Good range of motion actively and passively.  Symmetric muscle strength and development  Neuro: normal speech and mental status.  Cranial nerves II through XII intact.  No anosmia. DTR 2+; proprioception intact.  No focal motor/sensory deficits.    Assessment and Plan      Assessment/Plan:   Diagnoses and all orders for this visit:    1. Palpitations (Primary)  -     Holter Monitor - 72 Hour Up To 15 Days; Future  -     Ambulatory Referral to Cardiology  -     propranolol (INDERAL) 10 MG tablet; Take 1 tablet by mouth 2 (Two) Times a Day.  Dispense: 30 tablet; Refill: 1  -     ECG 12 Lead  -     CBC w AUTO Differential  -     Comprehensive metabolic panel  -     TSH  -     T4  -     Vitamin D 25 hydroxy  -     " Vitamin B12    2. Anxiety      Discussion Summary:  Discussed plan of care in detail with pt today; pt verb understanding and agrees.    Follow up:  Return if symptoms worsen or fail to improve.     Patient Education:  Patient Instructions       Palpitations  Palpitations are feelings that your heartbeat is irregular or is faster than normal. It may feel like your heart is fluttering or skipping a beat. Palpitations are usually not a serious problem. They may be caused by many things, including smoking, caffeine, alcohol, stress, and certain medicines or drugs. Most causes of palpitations are not serious. However, some palpitations can be a sign of a serious problem. You may need further tests to rule out serious medical problems.  Follow these instructions at home:         Pay attention to any changes in your condition. Take these actions to help manage your symptoms:  Eating and drinking  1. Avoid foods and drinks that may cause palpitations. These may include:  ? Caffeinated coffee, tea, soft drinks, diet pills, and energy drinks.  ? Chocolate.  ? Alcohol.  Lifestyle  1. Take steps to reduce your stress and anxiety. Things that can help you relax include:  ? Yoga.  ? Mind-body activities, such as deep breathing, meditation, or using words and images to create positive thoughts (guided imagery).  ? Physical activity, such as swimming, jogging, or walking. Tell your health care provider if your palpitations increase with activity. If you have chest pain or shortness of breath with activity, do not continue the activity until you are seen by your health care provider.  ? Biofeedback. This is a method that helps you learn to use your mind to control things in your body, such as your heartbeat.  2. Do not use drugs, including cocaine or ecstasy. Do not use marijuana.  3. Get plenty of rest and sleep. Keep a regular bed time.  General instructions  · Take over-the-counter and prescription medicines only as told by your  health care provider.  · Do not use any products that contain nicotine or tobacco, such as cigarettes and e-cigarettes. If you need help quitting, ask your health care provider.  · Keep all follow-up visits as told by your health care provider. This is important. These may include visits for further testing if palpitations do not go away or get worse.  Contact a health care provider if you:  · Continue to have a fast or irregular heartbeat after 24 hours.  · Notice that your palpitations occur more often.  Get help right away if you:  · Have chest pain or shortness of breath.  · Have a severe headache.  · Feel dizzy or you faint.  Summary  · Palpitations are feelings that your heartbeat is irregular or is faster than normal. It may feel like your heart is fluttering or skipping a beat.  · Palpitations may be caused by many things, including smoking, caffeine, alcohol, stress, certain medicines, and drugs.  · Although most causes of palpitations are not serious, some causes can be a sign of a serious medical problem.  · Get help right away if you faint or have chest pain, shortness of breath, a severe headache, or dizziness.  This information is not intended to replace advice given to you by your health care provider. Make sure you discuss any questions you have with your health care provider.  Document Revised: 01/30/2019 Document Reviewed: 01/30/2019  [employer] Patient Education © 2021 [employer]          SHER Vincent  08/23/22  10:44 EDT          Please note that portions of this note may have been completed with a voice recognition program. Efforts were made to edit the dictations, but occasionally words are mistranscribed.

## 2022-08-20 LAB
25(OH)D3+25(OH)D2 SERPL-MCNC: 27.9 NG/ML (ref 30–100)
ALBUMIN SERPL-MCNC: 4.1 G/DL (ref 3.5–5.2)
ALBUMIN/GLOB SERPL: 2.2 G/DL
ALP SERPL-CCNC: 77 U/L (ref 39–117)
ALT SERPL-CCNC: 12 U/L (ref 1–33)
AST SERPL-CCNC: 13 U/L (ref 1–32)
BASOPHILS # BLD AUTO: 0.04 10*3/MM3 (ref 0–0.2)
BASOPHILS NFR BLD AUTO: 0.5 % (ref 0–1.5)
BILIRUB SERPL-MCNC: <0.2 MG/DL (ref 0–1.2)
BUN SERPL-MCNC: 10 MG/DL (ref 6–20)
BUN/CREAT SERPL: 14.3 (ref 7–25)
CALCIUM SERPL-MCNC: 9.4 MG/DL (ref 8.6–10.5)
CHLORIDE SERPL-SCNC: 105 MMOL/L (ref 98–107)
CO2 SERPL-SCNC: 25.2 MMOL/L (ref 22–29)
CREAT SERPL-MCNC: 0.7 MG/DL (ref 0.57–1)
EGFRCR-CYS SERPLBLD CKD-EPI 2021: 111.6 ML/MIN/1.73
EOSINOPHIL # BLD AUTO: 0.18 10*3/MM3 (ref 0–0.4)
EOSINOPHIL NFR BLD AUTO: 2.1 % (ref 0.3–6.2)
ERYTHROCYTE [DISTWIDTH] IN BLOOD BY AUTOMATED COUNT: 13.3 % (ref 12.3–15.4)
GLOBULIN SER CALC-MCNC: 1.9 GM/DL
GLUCOSE SERPL-MCNC: 85 MG/DL (ref 65–99)
HCT VFR BLD AUTO: 42.9 % (ref 34–46.6)
HGB BLD-MCNC: 14.4 G/DL (ref 12–15.9)
IMM GRANULOCYTES # BLD AUTO: 0.03 10*3/MM3 (ref 0–0.05)
IMM GRANULOCYTES NFR BLD AUTO: 0.4 % (ref 0–0.5)
LYMPHOCYTES # BLD AUTO: 2.4 10*3/MM3 (ref 0.7–3.1)
LYMPHOCYTES NFR BLD AUTO: 28.5 % (ref 19.6–45.3)
MCH RBC QN AUTO: 29.4 PG (ref 26.6–33)
MCHC RBC AUTO-ENTMCNC: 33.6 G/DL (ref 31.5–35.7)
MCV RBC AUTO: 87.7 FL (ref 79–97)
MONOCYTES # BLD AUTO: 0.64 10*3/MM3 (ref 0.1–0.9)
MONOCYTES NFR BLD AUTO: 7.6 % (ref 5–12)
NEUTROPHILS # BLD AUTO: 5.12 10*3/MM3 (ref 1.7–7)
NEUTROPHILS NFR BLD AUTO: 60.9 % (ref 42.7–76)
NRBC BLD AUTO-RTO: 0 /100 WBC (ref 0–0.2)
PLATELET # BLD AUTO: 339 10*3/MM3 (ref 140–450)
POTASSIUM SERPL-SCNC: 4.4 MMOL/L (ref 3.5–5.2)
PROT SERPL-MCNC: 6 G/DL (ref 6–8.5)
RBC # BLD AUTO: 4.89 10*6/MM3 (ref 3.77–5.28)
SODIUM SERPL-SCNC: 139 MMOL/L (ref 136–145)
T4 SERPL-MCNC: 11.7 UG/DL (ref 4.5–12)
TSH SERPL DL<=0.005 MIU/L-ACNC: 1.74 UIU/ML (ref 0.27–4.2)
VIT B12 SERPL-MCNC: 315 PG/ML (ref 211–946)
WBC # BLD AUTO: 8.41 10*3/MM3 (ref 3.4–10.8)

## 2022-08-23 NOTE — PATIENT INSTRUCTIONS
Palpitations  Palpitations are feelings that your heartbeat is irregular or is faster than normal. It may feel like your heart is fluttering or skipping a beat. Palpitations are usually not a serious problem. They may be caused by many things, including smoking, caffeine, alcohol, stress, and certain medicines or drugs. Most causes of palpitations are not serious. However, some palpitations can be a sign of a serious problem. You may need further tests to rule out serious medical problems.  Follow these instructions at home:         Pay attention to any changes in your condition. Take these actions to help manage your symptoms:  Eating and drinking  Avoid foods and drinks that may cause palpitations. These may include:  Caffeinated coffee, tea, soft drinks, diet pills, and energy drinks.  Chocolate.  Alcohol.  Lifestyle  Take steps to reduce your stress and anxiety. Things that can help you relax include:  Yoga.  Mind-body activities, such as deep breathing, meditation, or using words and images to create positive thoughts (guided imagery).  Physical activity, such as swimming, jogging, or walking. Tell your health care provider if your palpitations increase with activity. If you have chest pain or shortness of breath with activity, do not continue the activity until you are seen by your health care provider.  Biofeedback. This is a method that helps you learn to use your mind to control things in your body, such as your heartbeat.  Do not use drugs, including cocaine or ecstasy. Do not use marijuana.  Get plenty of rest and sleep. Keep a regular bed time.  General instructions  Take over-the-counter and prescription medicines only as told by your health care provider.  Do not use any products that contain nicotine or tobacco, such as cigarettes and e-cigarettes. If you need help quitting, ask your health care provider.  Keep all follow-up visits as told by your health care provider. This is important. These may  include visits for further testing if palpitations do not go away or get worse.  Contact a health care provider if you:  Continue to have a fast or irregular heartbeat after 24 hours.  Notice that your palpitations occur more often.  Get help right away if you:  Have chest pain or shortness of breath.  Have a severe headache.  Feel dizzy or you faint.  Summary  Palpitations are feelings that your heartbeat is irregular or is faster than normal. It may feel like your heart is fluttering or skipping a beat.  Palpitations may be caused by many things, including smoking, caffeine, alcohol, stress, certain medicines, and drugs.  Although most causes of palpitations are not serious, some causes can be a sign of a serious medical problem.  Get help right away if you faint or have chest pain, shortness of breath, a severe headache, or dizziness.  This information is not intended to replace advice given to you by your health care provider. Make sure you discuss any questions you have with your health care provider.  Document Revised: 01/30/2019 Document Reviewed: 01/30/2019  [employer] Patient Education © 2021 [employer]

## 2022-08-31 ENCOUNTER — HOSPITAL ENCOUNTER (EMERGENCY)
Facility: HOSPITAL | Age: 42
Discharge: HOME | End: 2022-08-31
Attending: EMERGENCY MEDICINE | Admitting: EMERGENCY MEDICINE

## 2022-08-31 ENCOUNTER — APPOINTMENT (OUTPATIENT)
Dept: CT IMAGING | Facility: HOSPITAL | Age: 42
End: 2022-08-31

## 2022-08-31 VITALS
DIASTOLIC BLOOD PRESSURE: 90 MMHG | BODY MASS INDEX: 28.77 KG/M2 | OXYGEN SATURATION: 94 % | HEART RATE: 100 BPM | TEMPERATURE: 99.1 F | HEIGHT: 66 IN | WEIGHT: 179 LBS | SYSTOLIC BLOOD PRESSURE: 127 MMHG | RESPIRATION RATE: 18 BRPM

## 2022-08-31 DIAGNOSIS — K57.92 DIVERTICULITIS: Primary | ICD-10-CM

## 2022-08-31 LAB
ALBUMIN SERPL-MCNC: 4 G/DL (ref 3.5–5.2)
ALBUMIN/GLOB SERPL: 1.3 G/DL
ALP SERPL-CCNC: 82 U/L (ref 39–117)
ALT SERPL W P-5'-P-CCNC: 8 U/L (ref 1–33)
ANION GAP SERPL CALCULATED.3IONS-SCNC: 12.2 MMOL/L (ref 5–15)
AST SERPL-CCNC: 10 U/L (ref 1–32)
B-HCG UR QL: NEGATIVE
BACTERIA UR QL AUTO: ABNORMAL /HPF
BASOPHILS # BLD AUTO: 0.05 10*3/MM3 (ref 0–0.2)
BASOPHILS NFR BLD AUTO: 0.4 % (ref 0–1.5)
BILIRUB SERPL-MCNC: 0.4 MG/DL (ref 0–1.2)
BILIRUB UR QL STRIP: NEGATIVE
BUN SERPL-MCNC: 6 MG/DL (ref 6–20)
BUN/CREAT SERPL: 10.2 (ref 7–25)
CALCIUM SPEC-SCNC: 9.3 MG/DL (ref 8.6–10.5)
CHLORIDE SERPL-SCNC: 102 MMOL/L (ref 98–107)
CLARITY UR: CLEAR
CO2 SERPL-SCNC: 22.8 MMOL/L (ref 22–29)
COLOR UR: YELLOW
CREAT SERPL-MCNC: 0.59 MG/DL (ref 0.57–1)
D-LACTATE SERPL-SCNC: 0.8 MMOL/L (ref 0.5–2)
DEPRECATED RDW RBC AUTO: 41.5 FL (ref 37–54)
EGFRCR SERPLBLD CKD-EPI 2021: 116.3 ML/MIN/1.73
EOSINOPHIL # BLD AUTO: 0 10*3/MM3 (ref 0–0.4)
EOSINOPHIL NFR BLD AUTO: 0 % (ref 0.3–6.2)
ERYTHROCYTE [DISTWIDTH] IN BLOOD BY AUTOMATED COUNT: 13.2 % (ref 12.3–15.4)
GLOBULIN UR ELPH-MCNC: 3 GM/DL
GLUCOSE SERPL-MCNC: 118 MG/DL (ref 65–99)
GLUCOSE UR STRIP-MCNC: NEGATIVE MG/DL
HCT VFR BLD AUTO: 41.8 % (ref 34–46.6)
HGB BLD-MCNC: 14.6 G/DL (ref 12–15.9)
HGB UR QL STRIP.AUTO: ABNORMAL
HOLD SPECIMEN: NORMAL
HOLD SPECIMEN: NORMAL
HYALINE CASTS UR QL AUTO: ABNORMAL /LPF
IMM GRANULOCYTES # BLD AUTO: 0.04 10*3/MM3 (ref 0–0.05)
IMM GRANULOCYTES NFR BLD AUTO: 0.3 % (ref 0–0.5)
KETONES UR QL STRIP: NEGATIVE
LEUKOCYTE ESTERASE UR QL STRIP.AUTO: NEGATIVE
LIPASE SERPL-CCNC: 17 U/L (ref 13–60)
LYMPHOCYTES # BLD AUTO: 1.72 10*3/MM3 (ref 0.7–3.1)
LYMPHOCYTES NFR BLD AUTO: 15 % (ref 19.6–45.3)
MCH RBC QN AUTO: 30.2 PG (ref 26.6–33)
MCHC RBC AUTO-ENTMCNC: 34.9 G/DL (ref 31.5–35.7)
MCV RBC AUTO: 86.5 FL (ref 79–97)
MONOCYTES # BLD AUTO: 0.81 10*3/MM3 (ref 0.1–0.9)
MONOCYTES NFR BLD AUTO: 7.1 % (ref 5–12)
NEUTROPHILS NFR BLD AUTO: 77.2 % (ref 42.7–76)
NEUTROPHILS NFR BLD AUTO: 8.86 10*3/MM3 (ref 1.7–7)
NITRITE UR QL STRIP: NEGATIVE
NRBC BLD AUTO-RTO: 0 /100 WBC (ref 0–0.2)
PH UR STRIP.AUTO: 6.5 [PH] (ref 5–8)
PLATELET # BLD AUTO: 348 10*3/MM3 (ref 140–450)
PMV BLD AUTO: 9.7 FL (ref 6–12)
POTASSIUM SERPL-SCNC: 3.3 MMOL/L (ref 3.5–5.2)
PROT SERPL-MCNC: 7 G/DL (ref 6–8.5)
PROT UR QL STRIP: NEGATIVE
RBC # BLD AUTO: 4.83 10*6/MM3 (ref 3.77–5.28)
RBC # UR STRIP: ABNORMAL /HPF
REF LAB TEST METHOD: ABNORMAL
SODIUM SERPL-SCNC: 137 MMOL/L (ref 136–145)
SP GR UR STRIP: <=1.005 (ref 1–1.03)
SQUAMOUS #/AREA URNS HPF: ABNORMAL /HPF
UROBILINOGEN UR QL STRIP: ABNORMAL
WBC # UR STRIP: ABNORMAL /HPF
WBC NRBC COR # BLD: 11.48 10*3/MM3 (ref 3.4–10.8)
WHOLE BLOOD HOLD COAG: NORMAL
WHOLE BLOOD HOLD SPECIMEN: NORMAL

## 2022-08-31 PROCEDURE — 0 DIATRIZOATE MEGLUMINE & SODIUM PER 1 ML: Performed by: EMERGENCY MEDICINE

## 2022-08-31 PROCEDURE — 85025 COMPLETE CBC W/AUTO DIFF WBC: CPT | Performed by: EMERGENCY MEDICINE

## 2022-08-31 PROCEDURE — 83605 ASSAY OF LACTIC ACID: CPT | Performed by: EMERGENCY MEDICINE

## 2022-08-31 PROCEDURE — 25010000002 ONDANSETRON PER 1 MG: Performed by: EMERGENCY MEDICINE

## 2022-08-31 PROCEDURE — 96375 TX/PRO/DX INJ NEW DRUG ADDON: CPT

## 2022-08-31 PROCEDURE — 83690 ASSAY OF LIPASE: CPT | Performed by: EMERGENCY MEDICINE

## 2022-08-31 PROCEDURE — 74177 CT ABD & PELVIS W/CONTRAST: CPT

## 2022-08-31 PROCEDURE — 25010000002 MORPHINE PER 10 MG: Performed by: EMERGENCY MEDICINE

## 2022-08-31 PROCEDURE — 96374 THER/PROPH/DIAG INJ IV PUSH: CPT

## 2022-08-31 PROCEDURE — 81001 URINALYSIS AUTO W/SCOPE: CPT | Performed by: EMERGENCY MEDICINE

## 2022-08-31 PROCEDURE — 99284 EMERGENCY DEPT VISIT MOD MDM: CPT

## 2022-08-31 PROCEDURE — 81025 URINE PREGNANCY TEST: CPT | Performed by: EMERGENCY MEDICINE

## 2022-08-31 PROCEDURE — 25010000002 IOPAMIDOL 61 % SOLUTION: Performed by: EMERGENCY MEDICINE

## 2022-08-31 PROCEDURE — 80053 COMPREHEN METABOLIC PANEL: CPT | Performed by: EMERGENCY MEDICINE

## 2022-08-31 RX ORDER — AMOXICILLIN AND CLAVULANATE POTASSIUM 875; 125 MG/1; MG/1
1 TABLET, FILM COATED ORAL EVERY 12 HOURS
Qty: 20 TABLET | Refills: 0 | Status: SHIPPED | OUTPATIENT
Start: 2022-08-31 | End: 2022-09-09 | Stop reason: SDUPTHER

## 2022-08-31 RX ORDER — SODIUM CHLORIDE 0.9 % (FLUSH) 0.9 %
10 SYRINGE (ML) INJECTION AS NEEDED
Status: DISCONTINUED | OUTPATIENT
Start: 2022-08-31 | End: 2022-08-31 | Stop reason: HOSPADM

## 2022-08-31 RX ORDER — ONDANSETRON 2 MG/ML
4 INJECTION INTRAMUSCULAR; INTRAVENOUS ONCE
Status: COMPLETED | OUTPATIENT
Start: 2022-08-31 | End: 2022-08-31

## 2022-08-31 RX ORDER — MORPHINE SULFATE 4 MG/ML
4 INJECTION, SOLUTION INTRAMUSCULAR; INTRAVENOUS ONCE
Status: COMPLETED | OUTPATIENT
Start: 2022-08-31 | End: 2022-08-31

## 2022-08-31 RX ORDER — HYDROCODONE BITARTRATE AND ACETAMINOPHEN 5; 325 MG/1; MG/1
1 TABLET ORAL EVERY 6 HOURS PRN
Qty: 12 TABLET | Refills: 0 | Status: SHIPPED | OUTPATIENT
Start: 2022-08-31 | End: 2022-09-15

## 2022-08-31 RX ADMIN — IOPAMIDOL 100 ML: 612 INJECTION, SOLUTION INTRAVENOUS at 18:00

## 2022-08-31 RX ADMIN — DIATRIZOATE MEGLUMINE AND DIATRIZOATE SODIUM 30 ML: 660; 100 LIQUID ORAL; RECTAL at 18:00

## 2022-08-31 RX ADMIN — MORPHINE SULFATE 4 MG: 4 INJECTION, SOLUTION INTRAMUSCULAR; INTRAVENOUS at 14:59

## 2022-08-31 RX ADMIN — ONDANSETRON 4 MG: 2 INJECTION INTRAMUSCULAR; INTRAVENOUS at 14:58

## 2022-08-31 RX ADMIN — SODIUM CHLORIDE 1000 ML: 9 INJECTION, SOLUTION INTRAVENOUS at 14:59

## 2022-09-01 ENCOUNTER — OFFICE VISIT (OUTPATIENT)
Dept: FAMILY MEDICINE CLINIC | Facility: CLINIC | Age: 42
End: 2022-09-01

## 2022-09-01 VITALS
BODY MASS INDEX: 28.7 KG/M2 | DIASTOLIC BLOOD PRESSURE: 76 MMHG | HEART RATE: 109 BPM | HEIGHT: 66 IN | OXYGEN SATURATION: 96 % | TEMPERATURE: 97.5 F | SYSTOLIC BLOOD PRESSURE: 124 MMHG | RESPIRATION RATE: 16 BRPM | WEIGHT: 178.6 LBS

## 2022-09-01 DIAGNOSIS — R11.0 NAUSEA IN ADULT: ICD-10-CM

## 2022-09-01 DIAGNOSIS — K57.32 SIGMOID DIVERTICULITIS: Primary | ICD-10-CM

## 2022-09-01 PROCEDURE — 99214 OFFICE O/P EST MOD 30 MIN: CPT | Performed by: FAMILY MEDICINE

## 2022-09-01 RX ORDER — ONDANSETRON 4 MG/1
4 TABLET, ORALLY DISINTEGRATING ORAL EVERY 8 HOURS PRN
Qty: 30 TABLET | Refills: 0 | Status: SHIPPED | OUTPATIENT
Start: 2022-09-01 | End: 2022-09-15

## 2022-09-01 NOTE — PROGRESS NOTES
Established Patient        Chief Complaint:   Chief Complaint   Patient presents with   •  Diverticulitis        Vielka Jennings is a 42 y.o. female    History of Present Illness:   Here today in follow-up of recently diagnosed sigmoid diverticulitis.    Patient reports development of left lower quadrant abdominal discomfort, as well as associated nausea.  Denies any BRB/BTS.  She was seen in the emergency department where a CT scan and verified acute sigmoid diverticulitis, without signs of perforation or abscess formation.    She has had periodic diaphoresis, as well as cyclical nausea.  She has tolerated some p.o. intake.  The discomfort in her left lower quadrant is severe at times.  She was given a prescription for Augmentin, she is already started the medication.    I have reviewed completely the records from most recent emergency room visit, [facility].      Subjective     The following portions of the patient's history were reviewed and updated as appropriate: allergies, current medications, past family history, past medical history, past social history, past surgical history and problem list.    Allergies   Allergen Reactions   • Levaquin [Levofloxacin] Hives   • Sulfa Antibiotics Hives   • Zithromax [Azithromycin] Diarrhea       Review of Systems  1. Constitutional: Negative for fever. Negative for chills, diaphoresis, fatigue and unexpected weight change.   2. HENT: No dysphagia; no changes to vision/hearing/smell/taste; no epistaxis.   3. Eyes: Negative for redness and visual disturbance.   4. Respiratory: Cough as per above.  5. Cardiovascular: Negative for chest pain and palpitations.   6. Gastrointestinal: As per above.  7. Endocrine: Negative for cold intolerance and heat intolerance.   8. Genitourinary: Mild burning with urination, increased frequency.  No hematuria.  9. Musculoskeletal: Negative for arthralgias, back pain and myalgias.   10. Skin: Negative for color change,  "rash and wound.   11. Neurological: Negative for syncope, weakness and headaches.   12. Hematological: Negative for adenopathy. Does not bruise/bleed easily.   13. Psychiatric/Behavioral: Negative for confusion. The patient is not nervous/anxious.    Objective     Physical Exam   Vital Signs: /76   Pulse 109   Temp 97.5 °F (36.4 °C)   Resp 16   Ht 167.6 cm (66\")   Wt 81 kg (178 lb 9.6 oz)   SpO2 96%   BMI 28.83 kg/m²     General Appearance: alert, oriented x 3, no acute distress.  Skin: warm and dry.  Known port wine stain to the right face area  HEENT: Atraumatic.  pupils round and reactive to light and accommodation, oral mucosa pink and moist.  Nares patent without epistaxis.  External auditory canals are patent tympanic membranes intact.   Neck: supple, no JVD, trachea midline.  No thyromegaly  Lungs: CTA, unlabored breathing effort.  Heart: RRR, normal S1 and S2, no S3, no rub.  Abdomen: soft, no palpable bladder, present bowel sounds to auscultation ×4.  No guarding or rigidity.  No CVA tenderness.  Focal tenderness to the left lower quadrant.  Extremities: no clubbing, cyanosis or edema.  Good range of motion actively and passively.  Symmetric muscle strength and development  Neuro: normal speech and mental status.  Cranial nerves II through XII intact.  No anosmia. DTR 2+; proprioception intact.  No focal motor/sensory deficits.    Advance Care Planning   ACP discussion was held with the patient during this visit. Patient does not have an advance directive, information provided.    Assessment and Plan      Assessment:   Diagnoses and all orders for this visit:    1. Sigmoid diverticulitis (Primary)  -     Ambulatory Referral to General Surgery    2. Nausea in adult  -     ondansetron ODT (Zofran ODT) 4 MG disintegrating tablet; Place 1 tablet on the tongue Every 8 (Eight) Hours As Needed for Nausea or Vomiting.  Dispense: 30 tablet; Refill: 0        Plan:  Complete full course of Augmentin as " prescribed.  Advance diet as tolerated.  I have asked that she utilize the antiemetic I have sent to her pharmacy today scheduled every 8 hours for the next 2 days.  At that time she can then change to just as needed.    Have asked that she monitor closely her urine output, as well as her oral hydration.  Should she lose the ability to tolerate any p.o. intake, and/or demonstrate loss of urine output, she is to be seen in the emergency department for probable needed IV rehydration.    Patient has not yet undergone any form of screening colonoscopy; I have discussed with her that this will likely be needed in lieu of the new diagnosis of diverticulitis.  I have scheduled an appointment to be seen by one of our surgeons, Dr. Manzano, for needed endoscopy approximately 6 weeks from now.      Discussion Summary:    Discussed plan of care in detail with pt today; pt verb understanding and agrees.    I spent 30 minutes caring for Vielka on this date of service. This time includes time spent by me in the following activities:preparing for the visit, reviewing tests, performing a medically appropriate examination and/or evaluation , counseling and educating the patient/family/caregiver, ordering medications, tests, or procedures, referring and communicating with other health care professionals , documenting information in the medical record and care coordination    I have reviewed and updated all copied forward information, as appropriate.  I attest to the accuracy and relevance of any unchanged information.    Follow up:  No follow-ups on file.     There are no Patient Instructions on file for this visit.    Patricio Bass DO  09/01/22  11:38 EDT    Please note that portions of this note may have been completed with a voice recognition program. Efforts were made to edit the dictations, but occasionally words are mistranscribed.

## 2022-09-06 ENCOUNTER — TRANSCRIBE ORDERS (OUTPATIENT)
Dept: ADMINISTRATIVE | Facility: HOSPITAL | Age: 42
End: 2022-09-06

## 2022-09-06 DIAGNOSIS — Z12.31 VISIT FOR SCREENING MAMMOGRAM: Primary | ICD-10-CM

## 2022-09-07 NOTE — PROGRESS NOTES
Patient: Vielka Jennings    YOB: 1980    Date: 09/09/2022    Primary Care Provider: Patricio Bass DO    Chief Complaint   Patient presents with   • Diverticulitis       SUBJECTIVE:    History of present illness: Patient with a 2-week history of left lower abdominal pain.  Ultimately worsened and went to the emergency room and was evaluated and found to have diverticulitis.  She was placed on Augmentin and is almost finished with that.  She has improved but she still continues to have pain.  She has diarrhea alternating with constipation.    The following portions of the patient's history were reviewed and updated as appropriate: allergies, current medications, past family history, past medical history, past social history, past surgical history and problem list.      Review of Systems    Allergies:  Allergies   Allergen Reactions   • Levaquin [Levofloxacin] Hives   • Sulfa Antibiotics Hives   • Zithromax [Azithromycin] Diarrhea       Medications:    Current Outpatient Medications:   •  amoxicillin-clavulanate (AUGMENTIN) 875-125 MG per tablet, Take 1 tablet by mouth Every 12 (Twelve) Hours for 10 days., Disp: 20 tablet, Rfl: 0  •  HYDROcodone-acetaminophen (NORCO) 5-325 MG per tablet, Take 1 tablet by mouth Every 6 (Six) Hours As Needed for Moderate Pain., Disp: 12 tablet, Rfl: 0  •  Levonorgest-Eth Estrad 91-Day 0.1-0.02 & 0.01 MG tablet, Take 1 tablet by mouth Daily., Disp: 91 tablet, Rfl: 3  •  ondansetron ODT (Zofran ODT) 4 MG disintegrating tablet, Place 1 tablet on the tongue Every 8 (Eight) Hours As Needed for Nausea or Vomiting., Disp: 30 tablet, Rfl: 0  •  pantoprazole (Protonix) 40 MG EC tablet, Take 1 tablet by mouth Every Morning., Disp: 90 tablet, Rfl: 3    History:  Past Medical History:   Diagnosis Date   • Abnormal Pap smear of cervix    • Acid reflux    • Body piercing     EARS   • Cough     REPORTS SECONDARY TO SEASONAL ALLERGIES. REPORTS BEGAN AROUND 08-14-18. REPORTS NO  "FEVER OR PRODUCTION OF COUGH.   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 11/4/2016   • Heart murmur     REPORTS \"MURMUR AS A BABY\"   • IBS (irritable bowel syndrome)     REPORTS PAST HISTORY, NO CURRENT ISSUES   • Irritable bowel syndrome with diarrhea 6/3/2016   • Port-wine stain    • Seasonal allergies        Past Surgical History:   Procedure Laterality Date   • ANKLE SURGERY Left     REPORTS LIGAMENT REPAIR, THEN LATER HAD STITCHES REMOVED (1 YEAR POST OP)   • CERVICAL BIOPSY  W/ LOOP ELECTRODE EXCISION     • CHOLECYSTECTOMY     • DILATATION AND CURETTAGE     • ENDOSCOPY     • HYSTEROSCOPY ENDOMETRIAL ABLATION     • LEEP N/A 8/24/2018    Procedure: LOOP ELECTROCAUTERY EXCISION PROCEDURE;  Surgeon: Candy Knight MD;  Location: [facility];  Service: Obstetrics/Gynecology   • OTHER SURGICAL HISTORY      AFTER DELIVERY OF DAUGHTER REPORTS SURGICAL INTERVENTION FROM \"TEAR THAT WAS INTERNAL\"   • SKIN BIOPSY         Family History   Problem Relation Age of Onset   • Cancer Mother    • Hyperlipidemia Mother    • Melanoma Mother    • Hyperlipidemia Father    • Hypertension Father    • Hyperlipidemia Brother    • Hypertension Brother    • Thyroid disease Maternal Aunt    • Hyperlipidemia Maternal Grandmother    • Hyperlipidemia Maternal Grandfather    • Heart attack Paternal Grandmother    • Hyperlipidemia Paternal Grandmother    • Heart attack Paternal Grandfather    • Hyperlipidemia Paternal Grandfather        Social History     Tobacco Use   • Smoking status: Current Every Day Smoker     Packs/day: 0.50     Years: 18.00     Pack years: 9.00     Types: Cigarettes     Start date: 2000   • Smokeless tobacco: Never Used   Vaping Use   • Vaping Use: Never used   Substance Use Topics   • Alcohol use: No   • Drug use: No        OBJECTIVE:    Vital Signs:   Vitals:    09/09/22 1022   BP: 122/76   BP Location: Right arm   Patient Position: Sitting   Cuff Size: Adult   Pulse: 79   Temp: 97.9 °F (36.6 °C)   TempSrc: " "Temporal   SpO2: 97%   Weight: 80.3 kg (177 lb)   Height: 167.6 cm (66\")       Physical Exam:   General Appearance:    Alert, cooperative, in no acute distress   Head:    Normocephalic, without obvious abnormality, atraumatic   Eyes:            Normal.  No scleral icterus.  PERRLA    Lungs:     Clear to auscultation,respirations regular, even and                  unlabored    Heart:    Regular rhythm and normal rate, normal S1 and S2, no            murmur   Abdomen:    Soft and nondistended.  No hepatosplenomegaly.  Tender in the left lower quadrant without peritoneal signs.   Extremities:   Moves all extremities well, no edema, no cyanosis, no             redness   Skin:   No bleeding, bruising or rash   Neurologic:   Normal without gross deficits.   Psychiatric: No evidence of depression or anxiety        Results Review:   I reviewed the patient's new clinical results.  I reviewed her labs and CT including films and I agree with the interpretation.    Review of Systems was reviewed and confirmed as accurate as documented by the MA.    ASSESSMENT/PLAN:    1. Diverticulitis        Patient with diverticulitis involving the sigmoid colon.  She is improving on the Augmentin.  I would like her to continue this until the prescription is finished.  No acute surgical indication.  Patient ultimately will require colonoscopy.  We will hold off on this for 4 to 6 weeks.  She will communicate with me next week regarding her progress.  As far as the colonoscopy is concerned I explained the procedure to the patient as well as the risks of bleeding and perforation and they understand the ramifications of these potential complications and they wish to proceed once this has resolved.        Electronically signed by Tico Manzano MD  09/09/22          "

## 2022-09-09 ENCOUNTER — OFFICE VISIT (OUTPATIENT)
Dept: SURGERY | Facility: CLINIC | Age: 42
End: 2022-09-09

## 2022-09-09 VITALS
BODY MASS INDEX: 28.45 KG/M2 | TEMPERATURE: 97.9 F | SYSTOLIC BLOOD PRESSURE: 122 MMHG | HEIGHT: 66 IN | DIASTOLIC BLOOD PRESSURE: 76 MMHG | WEIGHT: 177 LBS | HEART RATE: 79 BPM | OXYGEN SATURATION: 97 %

## 2022-09-09 DIAGNOSIS — K57.92 DIVERTICULITIS: Primary | ICD-10-CM

## 2022-09-09 PROCEDURE — 99204 OFFICE O/P NEW MOD 45 MIN: CPT | Performed by: SURGERY

## 2022-09-09 RX ORDER — AMOXICILLIN AND CLAVULANATE POTASSIUM 875; 125 MG/1; MG/1
1 TABLET, FILM COATED ORAL EVERY 12 HOURS
Qty: 20 TABLET | Refills: 0 | Status: SHIPPED | OUTPATIENT
Start: 2022-09-09 | End: 2022-09-15

## 2022-09-14 ENCOUNTER — PREP FOR SURGERY (OUTPATIENT)
Dept: OTHER | Facility: HOSPITAL | Age: 42
End: 2022-09-14

## 2022-09-14 DIAGNOSIS — K57.32 DIVERTICULITIS OF LARGE INTESTINE WITHOUT PERFORATION OR ABSCESS WITHOUT BLEEDING: ICD-10-CM

## 2022-09-14 DIAGNOSIS — R93.3 ABNORMAL CT SCAN, GASTROINTESTINAL TRACT: Primary | ICD-10-CM

## 2022-09-15 ENCOUNTER — OFFICE VISIT (OUTPATIENT)
Dept: OBSTETRICS AND GYNECOLOGY | Facility: CLINIC | Age: 42
End: 2022-09-15

## 2022-09-15 VITALS
DIASTOLIC BLOOD PRESSURE: 78 MMHG | HEIGHT: 66 IN | BODY MASS INDEX: 28.57 KG/M2 | WEIGHT: 177.8 LBS | SYSTOLIC BLOOD PRESSURE: 126 MMHG

## 2022-09-15 DIAGNOSIS — R82.90 ABNORMAL URINE FINDINGS: ICD-10-CM

## 2022-09-15 DIAGNOSIS — Z30.41 ENCOUNTER FOR SURVEILLANCE OF CONTRACEPTIVE PILLS: ICD-10-CM

## 2022-09-15 DIAGNOSIS — R30.0 BURNING WITH URINATION: Primary | ICD-10-CM

## 2022-09-15 LAB
BILIRUB BLD-MCNC: NEGATIVE MG/DL
CLARITY, POC: ABNORMAL
COLOR UR: YELLOW
EXPIRATION DATE: ABNORMAL
GLUCOSE UR STRIP-MCNC: NEGATIVE MG/DL
KETONES UR QL: NEGATIVE
LEUKOCYTE EST, POC: ABNORMAL
Lab: ABNORMAL
NITRITE UR-MCNC: POSITIVE MG/ML
PH UR: 6 [PH] (ref 5–8)
PROT UR STRIP-MCNC: ABNORMAL MG/DL
RBC # UR STRIP: ABNORMAL /UL
SP GR UR: 1.02 (ref 1–1.03)
UROBILINOGEN UR QL: NORMAL

## 2022-09-15 PROCEDURE — 99214 OFFICE O/P EST MOD 30 MIN: CPT | Performed by: PHYSICIAN ASSISTANT

## 2022-09-15 PROCEDURE — 81003 URINALYSIS AUTO W/O SCOPE: CPT | Performed by: PHYSICIAN ASSISTANT

## 2022-09-15 RX ORDER — NITROFURANTOIN 25; 75 MG/1; MG/1
100 CAPSULE ORAL 2 TIMES DAILY
Qty: 14 CAPSULE | Refills: 0 | Status: SHIPPED | OUTPATIENT
Start: 2022-09-15 | End: 2022-09-22

## 2022-09-15 RX ORDER — LEVONORGESTREL AND ETHINYL ESTRADIOL 100-20(84)
1 KIT ORAL DAILY
Qty: 91 TABLET | Refills: 1 | Status: SHIPPED | OUTPATIENT
Start: 2022-09-15 | End: 2022-12-15 | Stop reason: SDUPTHER

## 2022-09-15 RX ORDER — PHENAZOPYRIDINE HYDROCHLORIDE 100 MG/1
100 TABLET, FILM COATED ORAL 3 TIMES DAILY PRN
Qty: 15 TABLET | Refills: 0 | Status: SHIPPED | OUTPATIENT
Start: 2022-09-15 | End: 2022-12-15

## 2022-09-15 NOTE — PATIENT INSTRUCTIONS
Start Macrobid today with her dysuria and abnormal UA findings.  We will contact patient with results of urine culture when available.  Follow-up 2 to 3 months for Pap/annual.

## 2022-09-15 NOTE — PROGRESS NOTES
"Subjective   Chief Complaint   Patient presents with   • Contraception     Patient is requesting a refill on birth control, will schedule for Annual at a later time.  C/O burning with urination, having bowel issues, CT done 2 weeks ago       Vielka Jennings is a 42 y.o. year old  who presents to get refills on birth control and also complaining of dysuria.  Dysuria started last night.  Significant burning with urination.  Has not seen any hematuria.  No flank pain.  No fever or chills.  She is using Seasonique OCPs and has bleeds every 3 months.  She had been experiencing some abdominal pain and had been evaluated in the emergency department about 2 weeks ago.  CT scan done noted the patient had acute diverticulitis.  She was treated with Augmentin which she finished yesterday and has GI follow-up arranged.  She wants to return a little bit later for annual exam once the diverticulitis completely settles down.    Past Medical History:   Diagnosis Date   • Abnormal Pap smear of cervix    • Acid reflux    • Body piercing     EARS   • Cough     REPORTS SECONDARY TO SEASONAL ALLERGIES. REPORTS BEGAN AROUND 18. REPORTS NO FEVER OR PRODUCTION OF COUGH.   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 2016   • Heart murmur     REPORTS \"MURMUR AS A BABY\"   • IBS (irritable bowel syndrome)     REPORTS PAST HISTORY, NO CURRENT ISSUES   • Irritable bowel syndrome with diarrhea 6/3/2016   • Port-wine stain    • Seasonal allergies         Current Outpatient Medications:   •  Levonorgest-Eth Estrad -Day 0.1-0.02 & 0.01 MG tablet, Take 1 tablet by mouth Daily., Disp: 91 tablet, Rfl: 1  •  pantoprazole (Protonix) 40 MG EC tablet, Take 1 tablet by mouth Every Morning., Disp: 90 tablet, Rfl: 3  •  nitrofurantoin, macrocrystal-monohydrate, (Macrobid) 100 MG capsule, Take 1 capsule by mouth 2 (Two) Times a Day for 7 days., Disp: 14 capsule, Rfl: 0  •  phenazopyridine (Pyridium) 100 MG tablet, Take 1 " "tablet by mouth 3 (Three) Times a Day As Needed for Bladder Spasms., Disp: 15 tablet, Rfl: 0   Allergies   Allergen Reactions   • Levaquin [Levofloxacin] Hives   • Sulfa Antibiotics Hives   • Zithromax [Azithromycin] Diarrhea      Past Surgical History:   Procedure Laterality Date   • ANKLE SURGERY Left     REPORTS LIGAMENT REPAIR, THEN LATER HAD STITCHES REMOVED (1 YEAR POST OP)   • CERVICAL BIOPSY  W/ LOOP ELECTRODE EXCISION     • CHOLECYSTECTOMY     • DILATATION AND CURETTAGE     • ENDOSCOPY     • HYSTEROSCOPY ENDOMETRIAL ABLATION     • LEEP N/A 8/24/2018    Procedure: LOOP ELECTROCAUTERY EXCISION PROCEDURE;  Surgeon: Candy Knight MD;  Location: [facility];  Service: Obstetrics/Gynecology   • OTHER SURGICAL HISTORY      AFTER DELIVERY OF DAUGHTER REPORTS SURGICAL INTERVENTION FROM \"TEAR THAT WAS INTERNAL\"   • SKIN BIOPSY        Social History     Socioeconomic History   • Marital status:    Tobacco Use   • Smoking status: Current Every Day Smoker     Packs/day: 0.50     Years: 18.00     Pack years: 9.00     Types: Cigarettes     Start date: 2000   • Smokeless tobacco: Never Used   Vaping Use   • Vaping Use: Never used   Substance and Sexual Activity   • Alcohol use: No   • Drug use: No   • Sexual activity: Defer     Birth control/protection: OCP      Family History   Problem Relation Age of Onset   • Cancer Mother    • Hyperlipidemia Mother    • Melanoma Mother    • Hyperlipidemia Father    • Hypertension Father    • Hyperlipidemia Brother    • Hypertension Brother    • Thyroid disease Maternal Aunt    • Hyperlipidemia Maternal Grandmother    • Hyperlipidemia Maternal Grandfather    • Heart attack Paternal Grandmother    • Hyperlipidemia Paternal Grandmother    • Heart attack Paternal Grandfather    • Hyperlipidemia Paternal Grandfather        Review of Systems   Constitutional: Negative for chills, diaphoresis and fever.   Gastrointestinal: Positive for abdominal pain.   Genitourinary: Positive for " "dysuria. Negative for flank pain, hematuria, vaginal bleeding and vaginal discharge.           Objective   /78   Ht 167.6 cm (66\")   Wt 80.6 kg (177 lb 12.8 oz)   LMP 07/09/2022 (Exact Date)   Breastfeeding No   BMI 28.70 kg/m²     Physical Exam  Constitutional:       Appearance: Normal appearance. She is well-groomed.   Eyes:      General: Lids are normal.      Extraocular Movements: Extraocular movements intact.      Conjunctiva/sclera: Conjunctivae normal.   Skin:     General: Skin is warm and dry.      Findings: No bruising or lesion.   Neurological:      Mental Status: She is alert.   Psychiatric:         Attention and Perception: Attention normal.         Mood and Affect: Mood normal.         Speech: Speech normal.         Behavior: Behavior is cooperative.            Result Review :   The following data was reviewed by: Sarah Biggs PA-C on 09/15/2022:    Data reviewed: CT scan results            Assessment and Plan  Diagnoses and all orders for this visit:    1. Burning with urination (Primary)  -     POC Urinalysis Dipstick, Automated  -     Urine Culture - Urine, Urine, Clean Catch  -     Urinalysis With Microscopic - Urine, Clean Catch    2. Abnormal urine findings  -     Urine Culture - Urine, Urine, Clean Catch  -     Urinalysis With Microscopic - Urine, Clean Catch    3. Encounter for surveillance of contraceptive pills    Other orders  -     nitrofurantoin, macrocrystal-monohydrate, (Macrobid) 100 MG capsule; Take 1 capsule by mouth 2 (Two) Times a Day for 7 days.  Dispense: 14 capsule; Refill: 0  -     Levonorgest-Eth Estrad 91-Day 0.1-0.02 & 0.01 MG tablet; Take 1 tablet by mouth Daily.  Dispense: 91 tablet; Refill: 1  -     phenazopyridine (Pyridium) 100 MG tablet; Take 1 tablet by mouth 3 (Three) Times a Day As Needed for Bladder Spasms.  Dispense: 15 tablet; Refill: 0      Patient Instructions   Start Macrobid today with her dysuria and abnormal UA findings.  We will contact " patient with results of urine culture when available.  Follow-up 2 to 3 months for Pap/annual.             This note was electronically signed.    Sarah Biggs PA-C   September 15, 2022

## 2022-09-21 LAB
APPEARANCE UR: ABNORMAL
BACTERIA #/AREA URNS HPF: ABNORMAL /HPF
BACTERIA UR CULT: ABNORMAL
BILIRUB UR QL STRIP: NEGATIVE
CASTS URNS MICRO: ABNORMAL
COLOR UR: YELLOW
EPI CELLS #/AREA URNS HPF: ABNORMAL /HPF
GLUCOSE UR QL STRIP: NEGATIVE
HGB UR QL STRIP: ABNORMAL
KETONES UR QL STRIP: NEGATIVE
LEUKOCYTE ESTERASE UR QL STRIP: ABNORMAL
NITRITE UR QL STRIP: POSITIVE
OTHER ANTIBIOTIC SUSC ISLT: ABNORMAL
PH UR STRIP: 6 [PH] (ref 5–8)
PROT UR QL STRIP: ABNORMAL
RBC #/AREA URNS HPF: ABNORMAL /HPF
SP GR UR STRIP: 1.01 (ref 1–1.03)
UROBILINOGEN UR STRIP-MCNC: ABNORMAL MG/DL
WBC #/AREA URNS HPF: ABNORMAL /HPF

## 2022-09-23 PROBLEM — K57.32 DIVERTICULITIS OF LARGE INTESTINE WITHOUT PERFORATION OR ABSCESS WITHOUT BLEEDING: Status: ACTIVE | Noted: 2022-09-23

## 2022-09-23 PROBLEM — R93.3 ABNORMAL CT SCAN, GASTROINTESTINAL TRACT: Status: ACTIVE | Noted: 2022-09-23

## 2022-09-23 RX ORDER — CEPHALEXIN 500 MG/1
500 CAPSULE ORAL 2 TIMES DAILY
Qty: 10 CAPSULE | Refills: 0 | Status: SHIPPED | OUTPATIENT
Start: 2022-09-23 | End: 2022-09-28

## 2022-09-23 NOTE — PROGRESS NOTES
Does she think symptoms are urinary or from recent diverticulitis? The macrobid should have resolved the UTI.

## 2022-10-06 ENCOUNTER — OFFICE VISIT (OUTPATIENT)
Dept: FAMILY MEDICINE CLINIC | Facility: CLINIC | Age: 42
End: 2022-10-06

## 2022-10-06 VITALS
OXYGEN SATURATION: 97 % | BODY MASS INDEX: 29.09 KG/M2 | DIASTOLIC BLOOD PRESSURE: 80 MMHG | HEIGHT: 66 IN | RESPIRATION RATE: 18 BRPM | SYSTOLIC BLOOD PRESSURE: 114 MMHG | HEART RATE: 82 BPM | TEMPERATURE: 97.9 F | WEIGHT: 181 LBS

## 2022-10-06 DIAGNOSIS — J20.9 ACUTE BRONCHITIS, UNSPECIFIED ORGANISM: Primary | ICD-10-CM

## 2022-10-06 PROCEDURE — 99213 OFFICE O/P EST LOW 20 MIN: CPT | Performed by: FAMILY MEDICINE

## 2022-10-06 PROCEDURE — 96372 THER/PROPH/DIAG INJ SC/IM: CPT | Performed by: FAMILY MEDICINE

## 2022-10-06 RX ORDER — IPRATROPIUM BROMIDE AND ALBUTEROL SULFATE 2.5; .5 MG/3ML; MG/3ML
3 SOLUTION RESPIRATORY (INHALATION) EVERY 6 HOURS PRN
Qty: 360 ML | Refills: 1 | Status: SHIPPED | OUTPATIENT
Start: 2022-10-06

## 2022-10-06 RX ORDER — METHYLPREDNISOLONE ACETATE 80 MG/ML
80 INJECTION, SUSPENSION INTRA-ARTICULAR; INTRALESIONAL; INTRAMUSCULAR; SOFT TISSUE ONCE
Status: COMPLETED | OUTPATIENT
Start: 2022-10-06 | End: 2022-10-06

## 2022-10-06 RX ORDER — DEXAMETHASONE 0.5 MG/1
TABLET ORAL
Qty: 35 TABLET | Refills: 0 | Status: ON HOLD | OUTPATIENT
Start: 2022-10-06 | End: 2022-11-04

## 2022-10-06 RX ORDER — METHYLPREDNISOLONE ACETATE 40 MG/ML
80 INJECTION, SUSPENSION INTRA-ARTICULAR; INTRALESIONAL; INTRAMUSCULAR; SOFT TISSUE ONCE
Status: DISCONTINUED | OUTPATIENT
Start: 2022-10-06 | End: 2022-10-06

## 2022-10-06 RX ADMIN — METHYLPREDNISOLONE ACETATE 80 MG: 80 INJECTION, SUSPENSION INTRA-ARTICULAR; INTRALESIONAL; INTRAMUSCULAR; SOFT TISSUE at 13:13

## 2022-10-06 NOTE — PROGRESS NOTES
Established Patient        Chief Complaint:   Chief Complaint   Patient presents with   •  Diverticulitis        Vielka Jennings is a 42 y.o. female    History of Present Illness:   Here today for evaluation of persistent cough and upper airway congestion for approximately 4 weeks.  Patient states she has had noticeable wheezing at times, cough seems to be worsened in the evening hours.  She denies any fever, chills or night sweats.  Denies any aspiration/dysphagia.  She has tested COVID negative for numerous times.    She denies any abnormal rashes.  Denies orthopnea, syncope, palpitations or vertigo.  Maintains an active lifestyle, balanced dietary intake.  Cough is productive of clear/yellow phlegm at times.  Denies any epistaxis or hemoptysis.    Maintains good urine output without hematuria.  Denies any BRB/BTS.    Patient reports that she was seen in urgent treatment center several days ago and was started on a course of cefdinir and a Medrol Dosepak.      Subjective     The following portions of the patient's history were reviewed and updated as appropriate: allergies, current medications, past family history, past medical history, past social history, past surgical history and problem list.    Allergies   Allergen Reactions   • Levaquin [Levofloxacin] Hives   • Sulfa Antibiotics Hives   • Zithromax [Azithromycin] Diarrhea       Review of Systems  1. Constitutional: Negative for fever. Negative for chills, diaphoresis, fatigue and unexpected weight change.   2. HENT: No dysphagia; no changes to vision/hearing/smell/taste; no epistaxis.   3. Eyes: Negative for redness and visual disturbance.   4. Respiratory: Cough as per above.  5. Cardiovascular: Negative for chest pain and palpitations.   6. Gastrointestinal: No BRB/BTS.  Tolerating all p.o. intake.  7. Endocrine: Negative for cold intolerance and heat intolerance.   8. Genitourinary: Mild burning with urination, increased frequency.  No  "hematuria.  9. Musculoskeletal: Negative for arthralgias, back pain and myalgias.   10. Skin: Negative for color change, rash and wound.   11. Neurological: Negative for syncope, weakness and headaches.   12. Hematological: Negative for adenopathy. Does not bruise/bleed easily.   13. Psychiatric/Behavioral: Negative for confusion. The patient is not nervous/anxious.    Objective     Physical Exam   Vital Signs: /80 (BP Location: Left arm, Patient Position: Sitting, Cuff Size: Adult)   Pulse 82   Temp 97.9 °F (36.6 °C) (Oral)   Resp 18   Ht 167.6 cm (66\")   Wt 82.1 kg (181 lb)   SpO2 97%   BMI 29.21 kg/m²     General Appearance: alert, oriented x 3, no acute distress.  Skin: warm and dry.  Known port wine stain to the right face area  HEENT: Atraumatic.  pupils round and reactive to light and accommodation, oral mucosa pink and moist.  Nares patent without epistaxis.  External auditory canals are patent tympanic membranes intact.   Neck: supple, no JVD, trachea midline.  No thyromegaly  Lungs: Rhonchus, referred upper airway congestion that is cleared with cough, unlabored breathing effort.  Trace end expiratory wheezes bilaterally.  Audible air exchange noted all lung fields.  Heart: RRR, normal S1 and S2, no S3, no rub.  Abdomen: soft, no palpable bladder, present bowel sounds to auscultation ×4.  No guarding or rigidity.  No CVA tenderness.  Without areas of focal tenderness.  Extremities: no clubbing, cyanosis or edema.  Good range of motion actively and passively.  Symmetric muscle strength and development  Neuro: normal speech and mental status.  Cranial nerves II through XII intact.  No anosmia. DTR 2+; proprioception intact.  No focal motor/sensory deficits.    Advance Care Planning   ACP discussion was held with the patient during this visit. Patient does not have an advance directive, information provided.    Assessment and Plan      Assessment:   Diagnoses and all orders for this visit:    1. " Acute bronchitis, unspecified organism (Primary)  -     dexamethasone (DECADRON) 0.5 MG tablet; 6 po x1d; then 5 po x 2d; then 4 po x 2d; then 3 po x 2d; then 2 po x 2d; then 1 po x1d; then STOP  Dispense: 35 tablet; Refill: 0  -     methylPREDNISolone acetate (DEPO-medrol) injection 80 mg  -     ipratropium-albuterol (DUO-NEB) 0.5-2.5 mg/3 ml nebulizer; Take 3 mL by nebulization Every 6 (Six) Hours As Needed for Wheezing.  Dispense: 360 mL; Refill: 1        Plan:    Nebulizer provided in office today; duoneb solution prn.    Depo-Medrol IM today, 80 mg.  I have also provided patient with a 10-day low-dose dexamethasone taper.    I have asked the patient finish course of cefdinir that was provided by urgent treatment center.  She is to notify the office should she worsen, or have any problems with above medications after starting.    Vital signs demonstrate hemodynamic stability.    Discussion Summary:    Discussed plan of care in detail with pt today; pt verb understanding and agrees.    I spent 25 minutes caring for Vielka on this date of service. This time includes time spent by me in the following activities:preparing for the visit, performing a medically appropriate examination and/or evaluation , counseling and educating the patient/family/caregiver, ordering medications, tests, or procedures, documenting information in the medical record and care coordination    I have reviewed and updated all copied forward information, as appropriate.  I attest to the accuracy and relevance of any unchanged information.    Follow up:  No follow-ups on file.     There are no Patient Instructions on file for this visit.    Patricio Bass DO  10/06/22  13:03 EDT    Please note that portions of this note may have been completed with a voice recognition program. Efforts were made to edit the dictations, but occasionally words are mistranscribed.

## 2022-10-28 RX ORDER — BISACODYL 5 MG
5 TABLET, DELAYED RELEASE (ENTERIC COATED) ORAL TAKE AS DIRECTED
Qty: 4 TABLET | Refills: 0 | Status: SHIPPED | OUTPATIENT
Start: 2022-10-28 | End: 2022-11-04 | Stop reason: HOSPADM

## 2022-10-28 RX ORDER — POLYETHYLENE GLYCOL 3350 17 G/17G
238 POWDER, FOR SOLUTION ORAL ONCE
Qty: 17 PACKET | Refills: 0 | Status: SHIPPED | OUTPATIENT
Start: 2022-10-28 | End: 2022-10-28

## 2022-11-01 ENCOUNTER — TELEPHONE (OUTPATIENT)
Dept: SURGERY | Facility: CLINIC | Age: 42
End: 2022-11-01

## 2022-11-01 NOTE — TELEPHONE ENCOUNTER
SPOKE WITH ROD TO CONFIRM  THE PROCEDURE ON 11/04/2022 @ [facility]..

## 2022-11-02 ENCOUNTER — TELEPHONE (OUTPATIENT)
Dept: SURGERY | Facility: CLINIC | Age: 42
End: 2022-11-02

## 2022-11-02 NOTE — TELEPHONE ENCOUNTER
Patient called and is having some cold symptoms and wants to make sure she can do colonoscopy scheduled 11/4/2022.  Please call at [phone].

## 2022-11-03 NOTE — PAT
Called Dr. Manzano's office and spoke with Tonya Hawk MA.  Informed that pt has a colonoscopy scheduled tomorrow with Dr. Manzano.  Pt reports that she has a cough and nasal congestion.  States that she had bronchitis last month and was treated, but still has residual cough and nasal congestion.  Pt denies any fever or any other S/S.  Informed that pt states that she contacted Dr. Manzano's office yesterday and was told that she can proceed as long as she doesn't have a fever.   Asked Tonya to inform Dr. Manzano regarding pt symptoms.  Verbalized understanding.

## 2022-11-03 NOTE — PRE-PROCEDURE INSTRUCTIONS
PAT phone history completed with pt for upcoming procedure on 11/4/22 with Dr. Gomes.      PAT PASS GIVEN/REVIEWED WITH PT.  VERBALIZED UNDERSTANDING OF THE FOLLOWING:  DO NOT EAT, DRINK, SMOKE, USE SMOKELESS TOBACCO OR CHEW GUM AFTER MIDNIGHT THE NIGHT BEFORE SURGERY.  THIS ALSO INCLUDES HARD CANDIES AND MINTS.    DO NOT SHAVE THE AREA TO BE OPERATED ON AT LEAST 48 HOURS PRIOR TO THE PROCEDURE.  DO NOT WEAR MAKE UP OR NAIL POLISH.  DO NOT LEAVE IN ANY PIERCING OR WEAR JEWELRY THE DAY OF SURGERY.      DO NOT USE ADHESIVES IF YOU WEAR DENTURES.    DO NOT WEAR EYE CONTACTS; BRING IN YOUR GLASSES.    ONLY TAKE MEDICATION THE MORNING OF YOUR PROCEDURE IF INSTRUCTED BY YOUR SURGEON WITH ENOUGH WATER TO SWALLOW THE MEDICATION.  IF YOUR SURGEON DID NOT SPECIFY WHICH MEDICATIONS TO TAKE, YOU WILL NEED TO CALL THEIR OFFICE FOR FURTHER INSTRUCTIONS AND DO AS THEY INSTRUCT.    LEAVE ANYTHING YOU CONSIDER VALUABLE AT HOME.    YOU WILL NEED TO ARRANGE FOR SOMEONE TO DRIVE YOU HOME AFTER SURGERY.  IT IS RECOMMENDED THAT YOU DO NOT DRIVE, WORK, DRINK ALCOHOL OR MAKE MAJOR DECISIONS FOR AT LEAST 24 HOURS AFTER YOUR PROCEDURE IS COMPLETE.      THE DAY OF YOUR PROCEDURE, BRING IN THE FOLLOWING IF APPLICABLE:   PICTURE ID AND INSURANCE/MEDICARE OR MEDICAID CARDS/ANY CO-PAY THAT MAY BE DUE   COPY OF ADVANCED DIRECTIVE/LIVING WILL/POWER OR    CPAP/BIPAP/INHALERS   SKIN PREP SHEET   YOUR PREADMISSION TESTING PASS (IF NOT A PHONE HISTORY)

## 2022-11-04 ENCOUNTER — HOSPITAL ENCOUNTER (OUTPATIENT)
Facility: HOSPITAL | Age: 42
Setting detail: HOSPITAL OUTPATIENT SURGERY
Discharge: HOME | End: 2022-11-04
Attending: SURGERY | Admitting: SURGERY

## 2022-11-04 ENCOUNTER — ANESTHESIA EVENT (OUTPATIENT)
Dept: GASTROENTEROLOGY | Facility: HOSPITAL | Age: 42
End: 2022-11-04

## 2022-11-04 ENCOUNTER — ANESTHESIA (OUTPATIENT)
Dept: GASTROENTEROLOGY | Facility: HOSPITAL | Age: 42
End: 2022-11-04

## 2022-11-04 VITALS
DIASTOLIC BLOOD PRESSURE: 90 MMHG | RESPIRATION RATE: 16 BRPM | OXYGEN SATURATION: 98 % | TEMPERATURE: 97.6 F | BODY MASS INDEX: 29.09 KG/M2 | SYSTOLIC BLOOD PRESSURE: 151 MMHG | HEIGHT: 66 IN | HEART RATE: 83 BPM | WEIGHT: 181 LBS

## 2022-11-04 DIAGNOSIS — K57.32 DIVERTICULITIS OF LARGE INTESTINE WITHOUT PERFORATION OR ABSCESS WITHOUT BLEEDING: ICD-10-CM

## 2022-11-04 DIAGNOSIS — R93.3 ABNORMAL CT SCAN, GASTROINTESTINAL TRACT: ICD-10-CM

## 2022-11-04 LAB
B-HCG UR QL: NEGATIVE
EXPIRATION DATE: NORMAL
INTERNAL NEGATIVE CONTROL: NORMAL
INTERNAL POSITIVE CONTROL: NORMAL
Lab: NORMAL

## 2022-11-04 PROCEDURE — 81025 URINE PREGNANCY TEST: CPT | Performed by: SURGERY

## 2022-11-04 PROCEDURE — 25010000002 PROPOFOL 1000 MG/100ML EMULSION: Performed by: NURSE ANESTHETIST, CERTIFIED REGISTERED

## 2022-11-04 PROCEDURE — S0260 H&P FOR SURGERY: HCPCS | Performed by: SURGERY

## 2022-11-04 PROCEDURE — 88305 TISSUE EXAM BY PATHOLOGIST: CPT

## 2022-11-04 RX ORDER — MAGNESIUM HYDROXIDE 1200 MG/15ML
LIQUID ORAL AS NEEDED
Status: DISCONTINUED | OUTPATIENT
Start: 2022-11-04 | End: 2022-11-04 | Stop reason: HOSPADM

## 2022-11-04 RX ORDER — SODIUM CHLORIDE, SODIUM LACTATE, POTASSIUM CHLORIDE, CALCIUM CHLORIDE 600; 310; 30; 20 MG/100ML; MG/100ML; MG/100ML; MG/100ML
1000 INJECTION, SOLUTION INTRAVENOUS CONTINUOUS
Status: DISCONTINUED | OUTPATIENT
Start: 2022-11-04 | End: 2022-11-04 | Stop reason: HOSPADM

## 2022-11-04 RX ORDER — PROPOFOL 10 MG/ML
INJECTION, EMULSION INTRAVENOUS AS NEEDED
Status: DISCONTINUED | OUTPATIENT
Start: 2022-11-04 | End: 2022-11-04 | Stop reason: SURG

## 2022-11-04 RX ORDER — ONDANSETRON 2 MG/ML
4 INJECTION INTRAMUSCULAR; INTRAVENOUS ONCE AS NEEDED
Status: DISCONTINUED | OUTPATIENT
Start: 2022-11-04 | End: 2022-11-04 | Stop reason: HOSPADM

## 2022-11-04 RX ORDER — LIDOCAINE HYDROCHLORIDE 20 MG/ML
INJECTION, SOLUTION INTRAVENOUS AS NEEDED
Status: DISCONTINUED | OUTPATIENT
Start: 2022-11-04 | End: 2022-11-04 | Stop reason: SURG

## 2022-11-04 RX ORDER — SODIUM CHLORIDE 0.9 % (FLUSH) 0.9 %
10 SYRINGE (ML) INJECTION AS NEEDED
Status: DISCONTINUED | OUTPATIENT
Start: 2022-11-04 | End: 2022-11-04 | Stop reason: HOSPADM

## 2022-11-04 RX ADMIN — PROPOFOL 50 MG: 10 INJECTION, EMULSION INTRAVENOUS at 12:37

## 2022-11-04 RX ADMIN — LIDOCAINE HYDROCHLORIDE 60 MG: 20 INJECTION, SOLUTION INTRAVENOUS at 12:17

## 2022-11-04 RX ADMIN — PROPOFOL 50 MG: 10 INJECTION, EMULSION INTRAVENOUS at 12:26

## 2022-11-04 RX ADMIN — PROPOFOL 50 MG: 10 INJECTION, EMULSION INTRAVENOUS at 12:17

## 2022-11-04 RX ADMIN — SODIUM CHLORIDE, POTASSIUM CHLORIDE, SODIUM LACTATE AND CALCIUM CHLORIDE 1000 ML: 600; 310; 30; 20 INJECTION, SOLUTION INTRAVENOUS at 11:45

## 2022-11-04 NOTE — ANESTHESIA POSTPROCEDURE EVALUATION
Patient: Vielka Jennings    Procedure Summary     Date: 11/04/22 Room / Location: [facility] ENDOSCOPY 2 / [facility] ENDOSCOPY    Anesthesia Start: 1219 Anesthesia Stop: 1248    Procedure: COLONOSCOPY WITH POLYPECTOMY X3 (Anus) Diagnosis:       Abnormal CT scan, gastrointestinal tract      Diverticulitis of large intestine without perforation or abscess without bleeding      (Abnormal CT scan, gastrointestinal tract [R93.3])      (Diverticulitis of large intestine without perforation or abscess without bleeding [K57.32])    Surgeons: Tico Manzano MD Provider: Radhames Hodge CRNA    Anesthesia Type: Not recorded ASA Status: Not recorded          Anesthesia Type: No value filed.    Vitals  No vitals data found for the desired time range.          Post Anesthesia Care and Evaluation    Patient location during evaluation: bedside  Patient participation: complete - patient participated  Level of consciousness: awake  Pain score: 0  Pain management: adequate    Airway patency: patent  Anesthetic complications: No anesthetic complications  PONV Status: controlled  Cardiovascular status: acceptable and stable  Respiratory status: acceptable and room air  Hydration status: acceptable    Comments: Vital signs as noted in nursing documentation as per protocol

## 2022-11-04 NOTE — ANESTHESIA PREPROCEDURE EVALUATION
Anesthesia Evaluation     Patient summary reviewed and Nursing notes reviewed   no history of anesthetic complications:  NPO Solid Status: > 8 hours  NPO Liquid Status: > 8 hours           Airway   Mallampati: II  TM distance: >3 FB  Neck ROM: full  no difficulty expected  Dental - normal exam     Pulmonary - normal exam   (+) a smoker Current Abstained day of surgery,   Cardiovascular - normal exam    Rhythm: regular  Rate: normal    (+) valvular problems/murmurs murmur,       Neuro/Psych  (+) psychiatric history Anxiety and Depression,    GI/Hepatic/Renal/Endo    (+)  GERD,      Musculoskeletal (-) negative ROS    Abdominal    Substance History - negative use     OB/GYN negative ob/gyn ROS         Other - negative ROS                         Anesthesia Plan    ASA 2     MAC     (Pt told that intravenous sedation will be used as the primary anesthetic along with local anesthesia if necessary. Every effort will be made to make sure the patient is comfortable.     The patient was told they may or may not have recall for the procedure. It was further explained that if the MAC was not adequate that a general anesthetic with either an LMA or endotracheal tube would be required.     Will proceed with the plan of care.)  intravenous induction     Anesthetic plan, risks, benefits, and alternatives have been provided, discussed and informed consent has been obtained with: patient.  Pre-procedure education provided  Plan discussed with CRNA.

## 2022-11-04 NOTE — DISCHARGE INSTRUCTIONS
Rest today  No pushing,pulling,tugging,heavy lifting, or strenuous activity   No major decision making,driving,or drinking alcoholic beverages for 24 hours due to the sedation you received  Always use good hand hygiene/washing technique  No driving on pain medication.

## 2022-11-04 NOTE — H&P
"    [facility]      Name:  Vielka Jennings   Age:  42 y.o.  Sex:  female  :  1980  MRN:  [id number]   Visit Number:  [id number]  Admission Date:  2022  Date Of Service:  22  Primary Care Physician:  Patricio Bass DO    Chief Complaint:     I need a colonoscopy    History Of Presenting Illness:      Here for a colonoscopy due to her history of apparent diverticulitis.  No new complaints.  No pain today.    Review Of Systems:     General ROS: Patient denies any fevers, chills or loss of consciousness.  No complaints of generalized weakness  Psychological ROS: Denies any hallucinations and delusions.  Respiratory ROS: Denies cough or shortness of breath.   Cardiovascular ROS: Denies chest pain or palpitations. No history of exertional chest pain.   Gastrointestinal ROS: Denies nausea and vomiting. Denies any abdominal pain. No diarrhea.   Genito-Urinary ROS: Denies dysuria or hematuria.  Neurological ROS: Denies any focal weakness. No loss of consciousness. Denies any numbness.   Dermatological ROS: Denies any redness or pruritis.     Past Medical History:    Past Medical History:   Diagnosis Date   • Abnormal Pap smear of cervix    • Acid reflux    • Body piercing     EARS   • Bronchitis 10/03/2022    has residual cough and nasal congestion (assessed 11/3/22)   • Diverticulitis    • Heart murmur     REPORTS \"MURMUR AS A BABY\"   • IBS (irritable bowel syndrome)     REPORTS PAST HISTORY, NO CURRENT ISSUES   • Irritable bowel syndrome with diarrhea 2016   • Port-wine stain    • Seasonal allergies        Past Surgical history:    Past Surgical History:   Procedure Laterality Date   • ANKLE SURGERY Left     REPORTS LIGAMENT REPAIR, THEN LATER HAD STITCHES REMOVED (1 YEAR POST OP)   • CERVICAL BIOPSY  W/ LOOP ELECTRODE EXCISION     • CHOLECYSTECTOMY     • DILATATION AND CURETTAGE     • ENDOSCOPY     • HYSTEROSCOPY ENDOMETRIAL ABLATION     • " "LEEP N/A 08/24/2018    Procedure: LOOP ELECTROCAUTERY EXCISION PROCEDURE;  Surgeon: Candy Knight MD;  Location: [facility];  Service: Obstetrics/Gynecology   • OTHER SURGICAL HISTORY      AFTER DELIVERY OF DAUGHTER REPORTS SURGICAL INTERVENTION FROM \"TEAR THAT WAS INTERNAL\"   • SKIN BIOPSY      benign       Social History:    Social History     Socioeconomic History   • Marital status:    Tobacco Use   • Smoking status: Every Day     Packs/day: 0.50     Years: 18.00     Pack years: 9.00     Types: Cigarettes     Start date: 2000   • Smokeless tobacco: Never   Vaping Use   • Vaping Use: Never used   Substance and Sexual Activity   • Alcohol use: No   • Drug use: No   • Sexual activity: Defer       Family History:    Family History   Problem Relation Age of Onset   • Cancer Mother    • Hyperlipidemia Mother    • Melanoma Mother    • Hyperlipidemia Father    • Hypertension Father    • Hyperlipidemia Brother    • Hypertension Brother    • Thyroid disease Maternal Aunt    • Hyperlipidemia Maternal Grandmother    • Hyperlipidemia Maternal Grandfather    • Heart attack Paternal Grandmother    • Hyperlipidemia Paternal Grandmother    • Heart attack Paternal Grandfather    • Hyperlipidemia Paternal Grandfather        Allergies:      Levaquin [levofloxacin], Sulfa antibiotics, and Zithromax [azithromycin]    Home Medications:    Prior to Admission Medications     Prescriptions Last Dose Informant Patient Reported? Taking?    pantoprazole (Protonix) 40 MG EC tablet 11/3/2022 Self No Yes    Take 1 tablet by mouth Every Morning.    bisacodyl (Dulcolax) 5 MG EC tablet 11/3/2022 Self No Yes    Take 1 tablet by mouth Take As Directed. Take 2 tablets 3pm and take 2 tablets at 5pm    ipratropium-albuterol (DUO-NEB) 0.5-2.5 mg/3 ml nebulizer Past Month Self No Yes    Take 3 mL by nebulization Every 6 (Six) Hours As Needed for Wheezing.    Levonorgest-Eth Estrad 91-Day 0.1-0.02 & 0.01 MG tablet 11/3/2022 Self No Yes    Take 1 " tablet by mouth Daily.    phenazopyridine (Pyridium) 100 MG tablet Unknown Self No No    Take 1 tablet by mouth 3 (Three) Times a Day As Needed for Bladder Spasms.    Patient not taking:  No sig reported             ED Medications:    Medications   lactated ringers infusion 1,000 mL ( Intravenous Currently Infusing 11/4/22 1219)       Vital Signs:    Temp:  [98 °F (36.7 °C)] 98 °F (36.7 °C)  Heart Rate:  [90] 90  Resp:  [18] 18  BP: (142)/(86) 142/86        11/03/22  1246   Weight: 82.1 kg (181 lb)       Body mass index is 29.21 kg/m².    Physical Exam:      General Appearance:  Alert and cooperative, not in any acute distress.   Head:  Atraumatic and normocephalic, without obvious abnormality.   Eyes:          PERRLA, conjunctivae and sclerae normal, no Icterus. No pallor. Extra-occular movements are within normal limits.   Ears:  Ears appear intact with no abnormalities noted.   Respiratory/Lungs:   Breath sounds heard bilaterally equally.  No crackles or wheezing. No Pleural rub or bronchial breathing. Normal respiratory effort.    Cardiovascular/Heart:  Normal S1 and S2,    GI/Abdomen:   No masses, no hepatosplenomegaly. Soft, non-tender, non-distended, no hernia                 Musculoskeletal/ Extremities:   Moves all extremities well   Skin: No bleeding, bruising or rash, no induration   Psychiatric : Alert and oriented ×3.  No depression or anxiety    Neurologic: Cranial nerves 2 - 12 grossly intact, sensation intact, Motor power is normal and equal bilaterally.       EKG:          Labs:    Lab Results (last 24 hours)     Procedure Component Value Units Date/Time    POC Pregnancy, Urine [[id number]] Collected: 11/04/22 1146    Specimen: Urine Updated: 11/04/22 1146     HCG, Urine, QL Negative     Lot Number [id number]     Internal Positive Control Passed     Internal Negative Control Passed     Expiration Date 02-          Radiology:    Imaging Results (Last 72 Hours)     ** No results found for the  last 72 hours. **          Assessment:    Patient with history of diverticulitis.  Presents here for colonoscopy.  No new complaints.    Plan:     I recommend a  colonoscopy to the patient.  The patient understands the procedure and the reason for the procedure.  The patient also understands the risks which include but are not limited to bleeding and perforation and they understand the ramifications of these potential complications including operative intervention and wish to proceed.    Tico Manzano MD  11/04/22  12:22 EDT

## 2022-11-07 LAB — REF LAB TEST METHOD: NORMAL

## 2022-11-17 ENCOUNTER — HOSPITAL ENCOUNTER (OUTPATIENT)
Dept: MAMMOGRAPHY | Facility: HOSPITAL | Age: 42
Discharge: HOME | End: 2022-11-17
Admitting: PHYSICIAN ASSISTANT

## 2022-11-17 DIAGNOSIS — Z12.31 VISIT FOR SCREENING MAMMOGRAM: ICD-10-CM

## 2022-11-17 PROCEDURE — 77063 BREAST TOMOSYNTHESIS BI: CPT

## 2022-11-17 PROCEDURE — 77067 SCR MAMMO BI INCL CAD: CPT

## 2022-12-15 ENCOUNTER — OFFICE VISIT (OUTPATIENT)
Dept: OBSTETRICS AND GYNECOLOGY | Facility: CLINIC | Age: 42
End: 2022-12-15

## 2022-12-15 VITALS
BODY MASS INDEX: 29.41 KG/M2 | WEIGHT: 183 LBS | SYSTOLIC BLOOD PRESSURE: 130 MMHG | DIASTOLIC BLOOD PRESSURE: 80 MMHG | HEIGHT: 66 IN

## 2022-12-15 DIAGNOSIS — N92.1 BREAKTHROUGH BLEEDING ON OCPS: ICD-10-CM

## 2022-12-15 DIAGNOSIS — Z30.41 ENCOUNTER FOR SURVEILLANCE OF CONTRACEPTIVE PILLS: ICD-10-CM

## 2022-12-15 DIAGNOSIS — Z01.419 ENCOUNTER FOR GYNECOLOGICAL EXAMINATION WITHOUT ABNORMAL FINDING: Primary | ICD-10-CM

## 2022-12-15 DIAGNOSIS — Z12.4 SCREENING FOR CERVICAL CANCER: ICD-10-CM

## 2022-12-15 PROCEDURE — 99396 PREV VISIT EST AGE 40-64: CPT | Performed by: PHYSICIAN ASSISTANT

## 2022-12-15 RX ORDER — LEVONORGESTREL AND ETHINYL ESTRADIOL 100-20(84)
1 KIT ORAL DAILY
Qty: 91 TABLET | Refills: 3 | Status: SHIPPED | OUTPATIENT
Start: 2022-12-15

## 2022-12-15 NOTE — PATIENT INSTRUCTIONS
Self breast exam monthly  Regular exercise    Patient is advised when she gets into her next pill pack if she continues to have breakthrough bleeding recommend she call to schedule pelvic ultrasound for evaluation of endometrium.  Discussed considering alternate contraception with progesterone only contraception as patient is a smoker.  She request to continue with Seasonique for now.

## 2022-12-15 NOTE — PROGRESS NOTES
"Subjective   Chief Complaint   Patient presents with   • Gynecologic Exam     Last pap done 21-WNL, MMG done 22-WNL, requesting a refill on birth control, No complaints       Vielka Jennings is a 42 y.o. year old  presenting to be seen for her annual gynecological exam.   She has no complaints.  She is desiring to continue Seasonique OCP as it suppresses her menses and pain with history of endometriosis.  Reports she has had frequent breakthrough bleeding since September when she was treated for urinary tract infection.  She experiences light bleeding about every 2 weeks lasting 3 to 4 days.  She has 2 weeks of active pills left in her current Seasonique pack.    Past Medical History:   Diagnosis Date   • Abnormal Pap smear of cervix    • Acid reflux    • Body piercing     EARS   • Bronchitis 10/03/2022    has residual cough and nasal congestion (assessed 11/3/22)   • Diverticulitis    • Heart murmur     REPORTS \"MURMUR AS A BABY\"   • IBS (irritable bowel syndrome)     REPORTS PAST HISTORY, NO CURRENT ISSUES   • Irritable bowel syndrome with diarrhea 2016   • Port-wine stain    • Seasonal allergies         Current Outpatient Medications:   •  ipratropium-albuterol (DUO-NEB) 0.5-2.5 mg/3 ml nebulizer, Take 3 mL by nebulization Every 6 (Six) Hours As Needed for Wheezing., Disp: 360 mL, Rfl: 1  •  Levonorgest-Eth Estrad -Day 0.1-0.02 & 0.01 MG tablet, Take 1 tablet by mouth Daily., Disp: 91 tablet, Rfl: 3  •  pantoprazole (Protonix) 40 MG EC tablet, Take 1 tablet by mouth Every Morning., Disp: 90 tablet, Rfl: 3   Allergies   Allergen Reactions   • Levaquin [Levofloxacin] Hives   • Sulfa Antibiotics Hives   • Zithromax [Azithromycin] Diarrhea      Past Surgical History:   Procedure Laterality Date   • ANKLE SURGERY Left     REPORTS LIGAMENT REPAIR, THEN LATER HAD STITCHES REMOVED (1 YEAR POST OP)   • CERVICAL BIOPSY  W/ LOOP ELECTRODE EXCISION     • CHOLECYSTECTOMY     • COLONOSCOPY N/A " "11/4/2022    Procedure: COLONOSCOPY WITH POLYPECTOMY X3;  Surgeon: Tico Manzano MD;  Location: [facility] ENDOSCOPY;  Service: Gastroenterology;  Laterality: N/A;   • DILATATION AND CURETTAGE     • ENDOSCOPY     • HYSTEROSCOPY ENDOMETRIAL ABLATION     • LEEP N/A 08/24/2018    Procedure: LOOP ELECTROCAUTERY EXCISION PROCEDURE;  Surgeon: Candy Knight MD;  Location: [facility] OR;  Service: Obstetrics/Gynecology   • OTHER SURGICAL HISTORY      AFTER DELIVERY OF DAUGHTER REPORTS SURGICAL INTERVENTION FROM \"TEAR THAT WAS INTERNAL\"   • SKIN BIOPSY      benign      Social History     Socioeconomic History   • Marital status:    Tobacco Use   • Smoking status: Every Day     Packs/day: 0.50     Years: 18.00     Pack years: 9.00     Types: Cigarettes     Start date: 2000   • Smokeless tobacco: Never   Vaping Use   • Vaping Use: Never used   Substance and Sexual Activity   • Alcohol use: No   • Drug use: No   • Sexual activity: Defer     Birth control/protection: Birth control pill      Family History   Problem Relation Age of Onset   • Cancer Mother    • Hyperlipidemia Mother    • Melanoma Mother    • Hyperlipidemia Father    • Hypertension Father    • Hyperlipidemia Brother    • Hypertension Brother    • Thyroid disease Maternal Aunt    • Hyperlipidemia Maternal Grandmother    • Hyperlipidemia Maternal Grandfather    • Heart attack Paternal Grandmother    • Hyperlipidemia Paternal Grandmother    • Heart attack Paternal Grandfather    • Hyperlipidemia Paternal Grandfather        Review of Systems   Constitutional: Negative for chills, diaphoresis and fever.   Gastrointestinal: Negative for constipation, diarrhea, nausea and vomiting.   Genitourinary: Positive for vaginal bleeding. Negative for difficulty urinating, dysuria and pelvic pain.           Objective   /80   Ht 167.6 cm (66\")   Wt 83 kg (183 lb)   BMI 29.54 kg/m²     Physical Exam  Exam conducted with a chaperone present.   Constitutional:       " Appearance: Normal appearance. She is well-developed and well-groomed.   Eyes:      General: Lids are normal.      Extraocular Movements: Extraocular movements intact.      Conjunctiva/sclera: Conjunctivae normal.   Abdominal:      General: There is no distension.      Palpations: Abdomen is soft.      Tenderness: There is no abdominal tenderness.   Genitourinary:     Labia:         Right: No rash, tenderness or lesion.         Left: No rash, tenderness or lesion.       Urethra: No prolapse, urethral pain, urethral swelling or urethral lesion.      Vagina: Bleeding present. No vaginal discharge, tenderness or lesions.      Cervix: Cervical bleeding present. No cervical motion tenderness, discharge, friability or lesion.      Uterus: Not enlarged and not tender.       Adnexa:         Right: No mass or tenderness.          Left: No mass or tenderness.        Comments: Light bleeding observed  Skin:     General: Skin is warm and dry.      Findings: No bruising or lesion.   Neurological:      General: No focal deficit present.      Mental Status: She is alert and oriented to person, place, and time.   Psychiatric:         Attention and Perception: Attention normal.         Mood and Affect: Mood normal.         Speech: Speech normal.         Behavior: Behavior is cooperative.            Result Review :                   Assessment and Plan  Diagnoses and all orders for this visit:    1. Encounter for gynecological examination without abnormal finding (Primary)  -     LIQUID-BASED PAP SMEAR, [facility] ([facility])    2. Screening for cervical cancer    3. Encounter for surveillance of contraceptive pills    4. Breakthrough bleeding on OCPs    Other orders  -     Levonorgest-Eth Estrad 91-Day 0.1-0.02 & 0.01 MG tablet; Take 1 tablet by mouth Daily.  Dispense: 91 tablet; Refill: 3      Patient Instructions   Self breast exam monthly  Regular exercise    Patient is advised when she gets into her next pill pack if she  continues to have breakthrough bleeding recommend she call to schedule pelvic ultrasound for evaluation of endometrium.  Discussed considering alternate contraception with progesterone only contraception as patient is a smoker.  She request to continue with Seasonique for now.             This note was electronically signed.    Sarah Biggs PA-C   December 15, 2022

## 2022-12-19 LAB — REF LAB TEST METHOD: NORMAL

## 2023-02-20 ENCOUNTER — TELEPHONE (OUTPATIENT)
Dept: OBSTETRICS AND GYNECOLOGY | Facility: CLINIC | Age: 43
End: 2023-02-20
Payer: COMMERCIAL

## 2023-02-20 NOTE — TELEPHONE ENCOUNTER
----- Message from Vielka Jennings sent at 2/20/2023 10:18 AM EST -----  Regarding: Spotting  Contact: [phone]  Russell Payan!  You told me to tell you if I don’t stop spotting. I am still spotting. Some days are heavier than others.

## 2023-02-24 ENCOUNTER — TELEPHONE (OUTPATIENT)
Dept: OBSTETRICS AND GYNECOLOGY | Facility: CLINIC | Age: 43
End: 2023-02-24
Payer: COMMERCIAL

## 2023-02-27 ENCOUNTER — PREP FOR SURGERY (OUTPATIENT)
Dept: OTHER | Facility: HOSPITAL | Age: 43
End: 2023-02-27
Payer: COMMERCIAL

## 2023-02-27 DIAGNOSIS — R93.5 ABNORMAL ULTRASOUND OF ENDOMETRIUM: ICD-10-CM

## 2023-02-27 DIAGNOSIS — N92.1 MENORRHAGIA WITH IRREGULAR CYCLE: Primary | ICD-10-CM

## 2023-02-27 RX ORDER — SODIUM CHLORIDE 0.9 % (FLUSH) 0.9 %
3 SYRINGE (ML) INJECTION EVERY 12 HOURS SCHEDULED
Status: CANCELLED | OUTPATIENT
Start: 2023-02-27

## 2023-02-27 RX ORDER — SODIUM CHLORIDE 0.9 % (FLUSH) 0.9 %
10 SYRINGE (ML) INJECTION AS NEEDED
Status: CANCELLED | OUTPATIENT
Start: 2023-02-27

## 2023-02-27 RX ORDER — SODIUM CHLORIDE 9 MG/ML
40 INJECTION, SOLUTION INTRAVENOUS AS NEEDED
Status: CANCELLED | OUTPATIENT
Start: 2023-02-27

## 2023-03-23 ENCOUNTER — TELEPHONE (OUTPATIENT)
Dept: PREADMISSION TESTING | Facility: HOSPITAL | Age: 43
End: 2023-03-23

## 2023-03-24 ENCOUNTER — PRE-ADMISSION TESTING (OUTPATIENT)
Dept: PREADMISSION TESTING | Facility: HOSPITAL | Age: 43
End: 2023-03-24
Payer: COMMERCIAL

## 2023-03-24 VITALS — WEIGHT: 182.6 LBS | HEIGHT: 66 IN | BODY MASS INDEX: 29.35 KG/M2

## 2023-03-24 DIAGNOSIS — R93.5 ABNORMAL ULTRASOUND OF ENDOMETRIUM: ICD-10-CM

## 2023-03-24 DIAGNOSIS — N92.1 MENORRHAGIA WITH IRREGULAR CYCLE: ICD-10-CM

## 2023-03-24 LAB
BACTERIA UR QL AUTO: ABNORMAL /HPF
BASOPHILS # BLD AUTO: 0.03 10*3/MM3 (ref 0–0.2)
BASOPHILS NFR BLD AUTO: 0.2 % (ref 0–1.5)
BILIRUB UR QL STRIP: NEGATIVE
CLARITY UR: CLEAR
COLOR UR: YELLOW
DEPRECATED RDW RBC AUTO: 45.9 FL (ref 37–54)
EOSINOPHIL # BLD AUTO: 0.01 10*3/MM3 (ref 0–0.4)
EOSINOPHIL NFR BLD AUTO: 0.1 % (ref 0.3–6.2)
ERYTHROCYTE [DISTWIDTH] IN BLOOD BY AUTOMATED COUNT: 14.3 % (ref 12.3–15.4)
GLUCOSE UR STRIP-MCNC: NEGATIVE MG/DL
HCT VFR BLD AUTO: 44 % (ref 34–46.6)
HGB BLD-MCNC: 14.5 G/DL (ref 12–15.9)
HGB UR QL STRIP.AUTO: ABNORMAL
HYALINE CASTS UR QL AUTO: ABNORMAL /LPF
IMM GRANULOCYTES # BLD AUTO: 0.05 10*3/MM3 (ref 0–0.05)
IMM GRANULOCYTES NFR BLD AUTO: 0.4 % (ref 0–0.5)
KETONES UR QL STRIP: NEGATIVE
LEUKOCYTE ESTERASE UR QL STRIP.AUTO: NEGATIVE
LYMPHOCYTES # BLD AUTO: 2.24 10*3/MM3 (ref 0.7–3.1)
LYMPHOCYTES NFR BLD AUTO: 18.3 % (ref 19.6–45.3)
MCH RBC QN AUTO: 28.4 PG (ref 26.6–33)
MCHC RBC AUTO-ENTMCNC: 33 G/DL (ref 31.5–35.7)
MCV RBC AUTO: 86.3 FL (ref 79–97)
MONOCYTES # BLD AUTO: 0.8 10*3/MM3 (ref 0.1–0.9)
MONOCYTES NFR BLD AUTO: 6.6 % (ref 5–12)
NEUTROPHILS NFR BLD AUTO: 74.4 % (ref 42.7–76)
NEUTROPHILS NFR BLD AUTO: 9.08 10*3/MM3 (ref 1.7–7)
NITRITE UR QL STRIP: NEGATIVE
NRBC BLD AUTO-RTO: 0 /100 WBC (ref 0–0.2)
PH UR STRIP.AUTO: 6 [PH] (ref 5–8)
PLATELET # BLD AUTO: 414 10*3/MM3 (ref 140–450)
PMV BLD AUTO: 9.7 FL (ref 6–12)
PROT UR QL STRIP: NEGATIVE
RBC # BLD AUTO: 5.1 10*6/MM3 (ref 3.77–5.28)
RBC # UR STRIP: ABNORMAL /HPF
REF LAB TEST METHOD: ABNORMAL
SP GR UR STRIP: <=1.005 (ref 1–1.03)
SQUAMOUS #/AREA URNS HPF: ABNORMAL /HPF
UROBILINOGEN UR QL STRIP: ABNORMAL
WBC # UR STRIP: ABNORMAL /HPF
WBC NRBC COR # BLD: 12.21 10*3/MM3 (ref 3.4–10.8)

## 2023-03-24 PROCEDURE — 36415 COLL VENOUS BLD VENIPUNCTURE: CPT

## 2023-03-24 PROCEDURE — 81001 URINALYSIS AUTO W/SCOPE: CPT

## 2023-03-24 PROCEDURE — 85025 COMPLETE CBC W/AUTO DIFF WBC: CPT

## 2023-04-03 ENCOUNTER — ANESTHESIA EVENT (OUTPATIENT)
Dept: PERIOP | Facility: HOSPITAL | Age: 43
End: 2023-04-03
Payer: COMMERCIAL

## 2023-04-03 ENCOUNTER — ANESTHESIA (OUTPATIENT)
Dept: PERIOP | Facility: HOSPITAL | Age: 43
End: 2023-04-03
Payer: COMMERCIAL

## 2023-04-03 ENCOUNTER — HOSPITAL ENCOUNTER (OUTPATIENT)
Facility: HOSPITAL | Age: 43
Setting detail: HOSPITAL OUTPATIENT SURGERY
Discharge: HOME | End: 2023-04-03
Attending: OBSTETRICS & GYNECOLOGY | Admitting: OBSTETRICS & GYNECOLOGY
Payer: COMMERCIAL

## 2023-04-03 VITALS
RESPIRATION RATE: 16 BRPM | SYSTOLIC BLOOD PRESSURE: 154 MMHG | OXYGEN SATURATION: 95 % | TEMPERATURE: 97.2 F | HEART RATE: 82 BPM | DIASTOLIC BLOOD PRESSURE: 85 MMHG

## 2023-04-03 DIAGNOSIS — N92.1 MENORRHAGIA WITH IRREGULAR CYCLE: ICD-10-CM

## 2023-04-03 DIAGNOSIS — R93.5 ABNORMAL ULTRASOUND OF ENDOMETRIUM: ICD-10-CM

## 2023-04-03 PROCEDURE — 81025 URINE PREGNANCY TEST: CPT | Performed by: OBSTETRICS & GYNECOLOGY

## 2023-04-03 PROCEDURE — S0260 H&P FOR SURGERY: HCPCS | Performed by: OBSTETRICS & GYNECOLOGY

## 2023-04-03 PROCEDURE — 25010000002 FENTANYL CITRATE (PF) 50 MCG/ML SOLUTION: Performed by: NURSE ANESTHETIST, CERTIFIED REGISTERED

## 2023-04-03 PROCEDURE — 25010000002 MIDAZOLAM PER 1MG: Performed by: NURSE ANESTHETIST, CERTIFIED REGISTERED

## 2023-04-03 PROCEDURE — 25010000002 PROPOFOL 10 MG/ML EMULSION: Performed by: NURSE ANESTHETIST, CERTIFIED REGISTERED

## 2023-04-03 PROCEDURE — 0 LIDOCAINE 1 % SOLUTION: Performed by: OBSTETRICS & GYNECOLOGY

## 2023-04-03 PROCEDURE — 58558 HYSTEROSCOPY BIOPSY: CPT | Performed by: OBSTETRICS & GYNECOLOGY

## 2023-04-03 RX ORDER — ONDANSETRON 2 MG/ML
4 INJECTION INTRAMUSCULAR; INTRAVENOUS ONCE AS NEEDED
Status: DISCONTINUED | OUTPATIENT
Start: 2023-04-03 | End: 2023-04-03 | Stop reason: HOSPADM

## 2023-04-03 RX ORDER — SODIUM CHLORIDE, SODIUM LACTATE, POTASSIUM CHLORIDE, CALCIUM CHLORIDE 600; 310; 30; 20 MG/100ML; MG/100ML; MG/100ML; MG/100ML
INJECTION, SOLUTION INTRAVENOUS CONTINUOUS PRN
Status: DISCONTINUED | OUTPATIENT
Start: 2023-04-03 | End: 2023-04-03 | Stop reason: SURG

## 2023-04-03 RX ORDER — HYDROCODONE BITARTRATE AND ACETAMINOPHEN 5; 325 MG/1; MG/1
1 TABLET ORAL EVERY 6 HOURS PRN
Qty: 12 TABLET | Refills: 0 | Status: SHIPPED | OUTPATIENT
Start: 2023-04-03 | End: 2023-04-14

## 2023-04-03 RX ORDER — LIDOCAINE HYDROCHLORIDE 10 MG/ML
INJECTION, SOLUTION INFILTRATION; PERINEURAL AS NEEDED
Status: DISCONTINUED | OUTPATIENT
Start: 2023-04-03 | End: 2023-04-03 | Stop reason: HOSPADM

## 2023-04-03 RX ORDER — SODIUM CHLORIDE 0.9 % (FLUSH) 0.9 %
3 SYRINGE (ML) INJECTION EVERY 12 HOURS SCHEDULED
Status: DISCONTINUED | OUTPATIENT
Start: 2023-04-03 | End: 2023-04-03 | Stop reason: HOSPADM

## 2023-04-03 RX ORDER — SODIUM CHLORIDE 9 MG/ML
40 INJECTION, SOLUTION INTRAVENOUS AS NEEDED
Status: DISCONTINUED | OUTPATIENT
Start: 2023-04-03 | End: 2023-04-03 | Stop reason: HOSPADM

## 2023-04-03 RX ORDER — ONDANSETRON HYDROCHLORIDE 8 MG/1
8 TABLET, FILM COATED ORAL EVERY 8 HOURS PRN
Qty: 15 TABLET | Refills: 0 | Status: SHIPPED | OUTPATIENT
Start: 2023-04-03

## 2023-04-03 RX ORDER — SODIUM CHLORIDE, SODIUM LACTATE, POTASSIUM CHLORIDE, CALCIUM CHLORIDE 600; 310; 30; 20 MG/100ML; MG/100ML; MG/100ML; MG/100ML
1000 INJECTION, SOLUTION INTRAVENOUS CONTINUOUS
Status: DISCONTINUED | OUTPATIENT
Start: 2023-04-03 | End: 2023-04-03 | Stop reason: HOSPADM

## 2023-04-03 RX ORDER — HYDROCODONE BITARTRATE AND ACETAMINOPHEN 5; 325 MG/1; MG/1
1 TABLET ORAL ONCE AS NEEDED
Status: DISCONTINUED | OUTPATIENT
Start: 2023-04-03 | End: 2023-04-03 | Stop reason: HOSPADM

## 2023-04-03 RX ORDER — KETAMINE HCL IN NACL, ISO-OSM 100MG/10ML
SYRINGE (ML) INJECTION AS NEEDED
Status: DISCONTINUED | OUTPATIENT
Start: 2023-04-03 | End: 2023-04-03 | Stop reason: SURG

## 2023-04-03 RX ORDER — SODIUM CHLORIDE 0.9 % (FLUSH) 0.9 %
10 SYRINGE (ML) INJECTION AS NEEDED
Status: DISCONTINUED | OUTPATIENT
Start: 2023-04-03 | End: 2023-04-03 | Stop reason: HOSPADM

## 2023-04-03 RX ORDER — FENTANYL CITRATE 50 UG/ML
INJECTION, SOLUTION INTRAMUSCULAR; INTRAVENOUS AS NEEDED
Status: DISCONTINUED | OUTPATIENT
Start: 2023-04-03 | End: 2023-04-03 | Stop reason: SURG

## 2023-04-03 RX ORDER — PROMETHAZINE HYDROCHLORIDE 12.5 MG/1
12.5 TABLET ORAL ONCE AS NEEDED
Status: DISCONTINUED | OUTPATIENT
Start: 2023-04-03 | End: 2023-04-03 | Stop reason: HOSPADM

## 2023-04-03 RX ORDER — MIDAZOLAM HYDROCHLORIDE 2 MG/2ML
INJECTION, SOLUTION INTRAMUSCULAR; INTRAVENOUS AS NEEDED
Status: DISCONTINUED | OUTPATIENT
Start: 2023-04-03 | End: 2023-04-03 | Stop reason: SURG

## 2023-04-03 RX ORDER — MAGNESIUM HYDROXIDE 1200 MG/15ML
LIQUID ORAL AS NEEDED
Status: DISCONTINUED | OUTPATIENT
Start: 2023-04-03 | End: 2023-04-03 | Stop reason: HOSPADM

## 2023-04-03 RX ORDER — IBUPROFEN 600 MG/1
600 TABLET ORAL EVERY 6 HOURS PRN
Qty: 60 TABLET | Refills: 0 | Status: SHIPPED | OUTPATIENT
Start: 2023-04-03 | End: 2023-04-14

## 2023-04-03 RX ORDER — ONDANSETRON 4 MG/1
4 TABLET, FILM COATED ORAL ONCE AS NEEDED
Status: DISCONTINUED | OUTPATIENT
Start: 2023-04-03 | End: 2023-04-03 | Stop reason: HOSPADM

## 2023-04-03 RX ADMIN — PROPOFOL 150 MCG/KG/MIN: 10 INJECTION, EMULSION INTRAVENOUS at 13:12

## 2023-04-03 RX ADMIN — FENTANYL CITRATE 50 MCG: 50 INJECTION INTRAMUSCULAR; INTRAVENOUS at 13:19

## 2023-04-03 RX ADMIN — MIDAZOLAM HYDROCHLORIDE 2 MG: 1 INJECTION, SOLUTION INTRAMUSCULAR; INTRAVENOUS at 13:09

## 2023-04-03 RX ADMIN — SODIUM CHLORIDE, POTASSIUM CHLORIDE, SODIUM LACTATE AND CALCIUM CHLORIDE 1000 ML: 600; 310; 30; 20 INJECTION, SOLUTION INTRAVENOUS at 12:20

## 2023-04-03 RX ADMIN — FENTANYL CITRATE 50 MCG: 50 INJECTION INTRAMUSCULAR; INTRAVENOUS at 13:12

## 2023-04-03 RX ADMIN — Medication 25 MG: at 12:45

## 2023-04-03 RX ADMIN — SODIUM CHLORIDE, POTASSIUM CHLORIDE, SODIUM LACTATE AND CALCIUM CHLORIDE: 600; 310; 30; 20 INJECTION, SOLUTION INTRAVENOUS at 12:42

## 2023-04-03 NOTE — H&P
" Rafael  Vielka Jennings  : 1980  MRN: [id number]  CSN: [id number]    History and Physical  Subjective   Vielka Jennings is a 43 y.o. year old  who presents for surgery due to metrorrhagia on oral contraceptives.  Patient was recently seen in the office.  She had complaints of irregular bleeding as well as breakthrough bleeding on oral contraceptives.  She had a transvaginal ultrasound showing a 3 cm intramural fibroid with possible distortion of the endometrium.  There is also a small calcification seen in the endometrium.  She is here for further evaluation with D&C and diagnostic hysteroscopy.    History  Past Medical History:   Diagnosis Date   • Abnormal Pap smear of cervix    • Acid reflux    • Body piercing     EARS   • Bronchitis 10/03/2022    has residual cough and nasal congestion (assessed 11/3/22)   • Diverticulitis    • Heart murmur     REPORTS \"MURMUR AS A BABY\"   • IBS (irritable bowel syndrome)     REPORTS PAST HISTORY, NO CURRENT ISSUES   • Irritable bowel syndrome with diarrhea 2016   • Port-wine stain    • Seasonal allergies      No current facility-administered medications on file prior to encounter.     Current Outpatient Medications on File Prior to Encounter   Medication Sig Dispense Refill   • ipratropium-albuterol (DUO-NEB) 0.5-2.5 mg/3 ml nebulizer Take 3 mL by nebulization Every 6 (Six) Hours As Needed for Wheezing. 360 mL 1   • Levonorgest-Eth Estrad -Day 0.1-0.02 & 0.01 MG tablet Take 1 tablet by mouth Daily. 91 tablet 3   • pantoprazole (Protonix) 40 MG EC tablet Take 1 tablet by mouth Every Morning. 90 tablet 3   • [DISCONTINUED] propranolol (INDERAL) 10 MG tablet Take 1 tablet by mouth 2 (Two) Times a Day. 30 tablet 1     Allergies   Allergen Reactions   • Levaquin [Levofloxacin] Hives   • Sulfa Antibiotics Hives   • Zithromax [Azithromycin] Diarrhea     Past Surgical History:   Procedure Laterality Date   • ANKLE SURGERY Left     REPORTS " "LIGAMENT REPAIR, THEN LATER HAD STITCHES REMOVED (1 YEAR POST OP)   • CERVICAL BIOPSY  W/ LOOP ELECTRODE EXCISION     • CHOLECYSTECTOMY     • COLONOSCOPY N/A 11/4/2022    Procedure: COLONOSCOPY WITH POLYPECTOMY X3;  Surgeon: Tico Manzano MD;  Location: [facility] ENDOSCOPY;  Service: Gastroenterology;  Laterality: N/A;   • DILATATION AND CURETTAGE     • ENDOSCOPY     • HYSTEROSCOPY ENDOMETRIAL ABLATION     • LEEP N/A 08/24/2018    Procedure: LOOP ELECTROCAUTERY EXCISION PROCEDURE;  Surgeon: Candy Knight MD;  Location: [facility] OR;  Service: Obstetrics/Gynecology   • OTHER SURGICAL HISTORY      AFTER DELIVERY OF DAUGHTER REPORTS SURGICAL INTERVENTION FROM \"TEAR THAT WAS INTERNAL\"   • SKIN BIOPSY      benign     Family History   Problem Relation Age of Onset   • Cancer Mother    • Hyperlipidemia Mother    • Melanoma Mother    • Hyperlipidemia Father    • Hypertension Father    • Hyperlipidemia Brother    • Hypertension Brother    • Thyroid disease Maternal Aunt    • Hyperlipidemia Maternal Grandmother    • Hyperlipidemia Maternal Grandfather    • Heart attack Paternal Grandmother    • Hyperlipidemia Paternal Grandmother    • Heart attack Paternal Grandfather    • Hyperlipidemia Paternal Grandfather      Social History     Socioeconomic History   • Marital status:    Tobacco Use   • Smoking status: Every Day     Packs/day: 0.50     Years: 18.00     Pack years: 9.00     Types: Cigarettes     Start date: 2000   • Smokeless tobacco: Never   Vaping Use   • Vaping Use: Never used   Substance and Sexual Activity   • Alcohol use: No   • Drug use: No   • Sexual activity: Defer     Review of Systems  The following systems were reviewed and negative:  constitution, eyes, ENT, respiratory, cardiovascular, gastrointestinal, genitourinary, integument, breast, hematologic / lymphatic, musculoskeletal, neurological, behavioral/psych, endocrine and allergies / immunologic    Objective  Recent Vitals       11/4/2022 11/4/2022 " 12/15/2022       BP: 119/85 151/90 130/80     Pulse: 93 83 --     Temp: 98.6 °F (37 °C) 97.6 °F (36.4 °C) --     Weight: -- -- 83 kg (183 lb)     BMI (Calculated): -- -- 29.6         Physical Exam:  General Appearance:  Alert and cooperative, not in any acute distress.   Head:  Atraumatic and normocephalic, without obvious abnormality.   Eyes:          PERRLA, conjunctivae and sclerae normal, no Icterus. No pallor. Extraocular movements are within normal limits.   Ears:  Ears appear intact with no abnormalities noted.   Throat: No oral lesions, no thrush, oral mucosa moist.   Neck: Supple, trachea midline, no thyromegaly, no carotid bruit.   Back:   No kyphoscoliosis present. No tenderness to palpation,   range of motion normal.  No CVAT.   Lungs:   Chest shape is normal. Breath sounds heard bilaterally equally.  No crackles or wheezing. No Pleural rub or bronchial breathing. Normal respiratory effort.   Heart:  Normal S1 and S2, no murmur, no gallop, no rub. No JVD.   Abdomen:   Normal bowel sounds, no masses, no hepatomegaly, no splenomegaly. Soft, non-tender, no guarding, no rebound tenderness.   Extremities: Moves all extremities well, no edema, no cyanosis, no clubbing.  Normal range of motion. Normal strength and tone.   Skin: No bleeding, bruising or rash. No palpable masses noted or induration.   Psychiatric: Alert and oriented  to time, place, and person. Appropriate mood and affect. Thought content organized and appropriate judgment.       Recent Labs  Lab Results (last 7 days)     ** No results found for the last 168 hours. **           Recent Imaging  No radiology results for the last 10 days        Assessment   1. Metromenorrhagia with breakthrough bleeding on oral contraceptives  2. Leiomyoma  3. Abnormal endometrium on ultrasound     Plan   1. D&C with diagnostic hysteroscopy.  2. ABx and DVT prophylaxis as indicated.  3. Risks, complications, benefits, and other alternatives discussed.  4. All  questions answered and pt in agreement with plan.    Candy Knight M.D.  4/3/2023  07:43 EDT

## 2023-04-03 NOTE — ANESTHESIA PREPROCEDURE EVALUATION
Anesthesia Evaluation     Patient summary reviewed and Nursing notes reviewed   no history of anesthetic complications:  NPO Solid Status: > 8 hours  NPO Liquid Status: > 8 hours           Airway   Mallampati: II  TM distance: >3 FB  Neck ROM: full  no difficulty expected and Possible difficult intubation  Dental - normal exam     Pulmonary    (+) a smoker Current Abstained day of surgery, COPD, asthma,shortness of breath, sleep apnea, decreased breath sounds,   Cardiovascular - normal exam    Rhythm: regular  Rate: normal    (+) valvular problems/murmurs murmur, PVD,       Neuro/Psych  (+) psychiatric history Anxiety and Depression,    GI/Hepatic/Renal/Endo    (+) obesity,  GERD,      Musculoskeletal (-) negative ROS    Abdominal    Substance History - negative use     OB/GYN negative ob/gyn ROS         Other - negative ROS       ROS/Med Hx Other: Labs reviewed   2020 ekg sr  2016 echo ef 56% . Normal cardiac anatomy.    2. Right ventricle is normal in size and the systolic function is normal.    3. Left ventricle is normal in size and the systolic function is normal.    4. Left sided aortic arch with normal branching.    5. The ascending aorta, transverse arch and descending aorta are unobstructed.    6. There is no significant pericardial effusion.                         Anesthesia Plan    ASA 3     MAC     (Pt told that intravenous sedation will be used as the primary anesthetic along with local anesthesia if necessary. Every effort will be made to make sure the patient is comfortable.       Will proceed with the plan of care.)  intravenous induction     Anesthetic plan, risks, benefits, and alternatives have been provided, discussed and informed consent has been obtained with: patient.  Pre-procedure education provided  Plan discussed with CRNA.

## 2023-04-03 NOTE — ANESTHESIA POSTPROCEDURE EVALUATION
Patient: Vielka Jennings    Procedure Summary     Date: 04/03/23 Room / Location: [facility] OR 2 /  [facility]    Anesthesia Start: 1311 Anesthesia Stop:     Procedure: DILATATION AND CURETTAGE HYSTEROSCOPY (Uterus) Diagnosis:       Menorrhagia with irregular cycle      Abnormal ultrasound of endometrium      (Menorrhagia with irregular cycle [N92.1])      (Abnormal ultrasound of endometrium [R93.5])    Surgeons: Candy Knight MD Provider: Sha Haro CRNA    Anesthesia Type: MAC ASA Status: 3          Anesthesia Type: MAC    Vitals  No vitals data found for the desired time range.          Post Anesthesia Care and Evaluation    Patient location during evaluation: PHASE II  Patient participation: complete - patient participated  Level of consciousness: awake  Pain score: 0  Pain management: adequate    Airway patency: patent  Anesthetic complications: No anesthetic complications  PONV Status: none  Cardiovascular status: acceptable  Respiratory status: acceptable and face mask  Hydration status: acceptable    Comments: vsss resp spont, reflexes intact, responsive, report given to pacu nurseSee R.N. note for postop vital signs.

## 2023-04-03 NOTE — OP NOTE
Rafael Jennings  : 1980  MRN: [id number]  CSN: [id number]  Date: 4/3/2023    Operative Report    Pre-op Diagnosis:  Menorrhagia with irregular cycle [N92.1]  Abnormal ultrasound of endometrium [R93.5]  Leiomyoma     Post-op Diagnosis:  Post-Op Diagnosis Codes:     * Menorrhagia with irregular cycle [N92.1]     * Abnormal ultrasound of endometrium [R93.5]       Cervical stenosis       Same      Procedure: Procedure(s):  DILATATION AND CURETTAGE HYSTEROSCOPY     Surgeon: Candy Knight M.D.     Assist: Staff was responsible for performing the following activities: Retraction and Suction and their skilled assistance was necessary for the success of this case.     OR Staff: Circulator: Inna Mclean RN  Scrub Person: Liss Zayas     Anesthesia: IV sedation with 1% lidocaine paracervical block     Estimated Blood Loss: 5 mls     Specimens:  Endometrial     Findings: Cervix stenotic.  Endometrium deviated to the right with distortion from leiomyoma.  Moderate amount of tissue seen and retrieved.      Complications: none       Description of Procedure:  After the appropriate time out and adequate dosing of her anesthesia, the patient had been prepped and draped in the usual sterile fashion.  She was placed in the dorsal lithotomy position using Ousmane stirrups.  The bladder had been drained with a red rubber catheter per nursing.  A weighted speculum was placed in the vagina.  The anterior lip of the cervix was grasped with a single-tooth tenaculum.  The cervix was injected at the 3 o'clock and 9 o'clock position with 1% lidocaine plain without any complications.  The cervix was then progressively dilated using Arevalo dilators.  Rigid hysteroscopy was then performed with the above findings noted.  Sharp curettings were then obtained with a good cry throughout with tissue retrieved and sent for pathologic specimen.  The cervical tenaculum was removed and the cervix was noted to be hemostatic.   All instrument and sponge counts were correct at the end of the procedure.  The patient tolerated the procedure well.  There were no complications.  She was taken to the postoperative recovery room in stable condition.    Candy Knight M.D.  4/3/2023  13:47 EDT

## 2023-04-05 LAB — REF LAB TEST METHOD: NORMAL

## 2023-04-14 ENCOUNTER — OFFICE VISIT (OUTPATIENT)
Dept: OBSTETRICS AND GYNECOLOGY | Facility: CLINIC | Age: 43
End: 2023-04-14
Payer: COMMERCIAL

## 2023-04-14 VITALS
SYSTOLIC BLOOD PRESSURE: 160 MMHG | DIASTOLIC BLOOD PRESSURE: 100 MMHG | WEIGHT: 182 LBS | BODY MASS INDEX: 29.25 KG/M2 | HEIGHT: 66 IN

## 2023-04-14 DIAGNOSIS — Z09 POSTOPERATIVE FOLLOW-UP: Primary | ICD-10-CM

## 2023-04-14 PROCEDURE — 99024 POSTOP FOLLOW-UP VISIT: CPT | Performed by: MIDWIFE

## 2023-04-14 NOTE — PROGRESS NOTES
"Subjective   Chief Complaint   Patient presents with   • Post-op Follow-up     No Complaints/concerns      Vielka Jennings is a 43 y.o. year old  presenting to be seen for her post-operative visit.  Currently she reports no problems with eating, bowel movements, voiding, or wound drainage and pain is well controlled.    The pathology results from her procedure are in Vielka's record and are benign.      OTHER COMPLAINTS:  Nothing else       Objective   /100   Ht 167.6 cm (66\")   Wt 82.6 kg (182 lb)   BMI 29.38 kg/m²     General:  well developed; well nourished  no acute distress   Abdomen: Not performed.   Pelvis: Not performed.          Assessment   1. S/P hysteroscopy and D+C     Plan   1. May return to full activity with no restrictions  2. Follow up PRN     No orders of the defined types were placed in this encounter.         This note was electronically signed.  Inna Curry, APRN  2023  "

## 2023-05-08 ENCOUNTER — APPOINTMENT (OUTPATIENT)
Dept: GENERAL RADIOLOGY | Facility: HOSPITAL | Age: 43
End: 2023-05-08
Payer: COMMERCIAL

## 2023-05-08 ENCOUNTER — HOSPITAL ENCOUNTER (EMERGENCY)
Facility: HOSPITAL | Age: 43
Discharge: HOME | End: 2023-05-08
Attending: STUDENT IN AN ORGANIZED HEALTH CARE EDUCATION/TRAINING PROGRAM | Admitting: STUDENT IN AN ORGANIZED HEALTH CARE EDUCATION/TRAINING PROGRAM
Payer: COMMERCIAL

## 2023-05-08 VITALS
HEIGHT: 66 IN | HEART RATE: 92 BPM | BODY MASS INDEX: 29.28 KG/M2 | SYSTOLIC BLOOD PRESSURE: 159 MMHG | TEMPERATURE: 98.2 F | WEIGHT: 182.2 LBS | OXYGEN SATURATION: 99 % | RESPIRATION RATE: 18 BRPM | DIASTOLIC BLOOD PRESSURE: 85 MMHG
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
## 2023-05-08 PROCEDURE — 73030 X-RAY EXAM OF SHOULDER: CPT

## 2023-05-08 PROCEDURE — 99283 EMERGENCY DEPT VISIT LOW MDM: CPT

## 2023-05-08 RX ORDER — NAPROXEN 500 MG/1
500 TABLET ORAL 2 TIMES DAILY PRN
Qty: 14 TABLET | Refills: 0 | Status: SHIPPED | OUTPATIENT
Start: 2023-05-08

## 2023-05-08 RX ORDER — NAPROXEN 500 MG/1
500 TABLET ORAL ONCE
Status: COMPLETED | OUTPATIENT
Start: 2023-05-08 | End: 2023-05-08

## 2023-05-08 RX ADMIN — NAPROXEN 500 MG: 500 TABLET ORAL at 08:20

## 2023-05-08 NOTE — Clinical Note
[facility] EMERGENCY DEPARTMENT  [address] [address]  Phone: [phone]    Vielka Jennings was seen and treated in our emergency department on 5/8/2023.  She may return to work on 05/09/2023.         Thank you for choosing [facility].    Selvin Alex MD       Comment: all four lesions have a different presentation and not sure that I would classify as a rash. I will punch bx the newest lesion on her right arm. Detail Level: Simple
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
## 2023-08-31 DIAGNOSIS — J20.9 ACUTE BRONCHITIS, UNSPECIFIED ORGANISM: ICD-10-CM

## 2023-08-31 RX ORDER — IPRATROPIUM BROMIDE AND ALBUTEROL SULFATE 2.5; .5 MG/3ML; MG/3ML
SOLUTION RESPIRATORY (INHALATION)
Qty: 360 ML | Refills: 1 | Status: SHIPPED | OUTPATIENT
Start: 2023-08-31

## 2023-09-02 DIAGNOSIS — J20.9 ACUTE BRONCHITIS, UNSPECIFIED ORGANISM: ICD-10-CM

## 2023-09-05 RX ORDER — IPRATROPIUM BROMIDE AND ALBUTEROL SULFATE 2.5; .5 MG/3ML; MG/3ML
SOLUTION RESPIRATORY (INHALATION)
OUTPATIENT
Start: 2023-09-05

## 2024-03-22 ENCOUNTER — OFFICE VISIT (OUTPATIENT)
Dept: FAMILY MEDICINE CLINIC | Facility: CLINIC | Age: 44
End: 2024-03-22
Payer: COMMERCIAL

## 2024-03-22 VITALS
OXYGEN SATURATION: 97 % | BODY MASS INDEX: 30.22 KG/M2 | HEART RATE: 93 BPM | HEIGHT: 66 IN | DIASTOLIC BLOOD PRESSURE: 78 MMHG | SYSTOLIC BLOOD PRESSURE: 140 MMHG | WEIGHT: 188 LBS

## 2024-03-22 DIAGNOSIS — K21.9 GASTROESOPHAGEAL REFLUX DISEASE WITHOUT ESOPHAGITIS: Primary | ICD-10-CM

## 2024-03-22 DIAGNOSIS — F17.200 TOBACCO USE DISORDER: ICD-10-CM

## 2024-03-22 PROCEDURE — 99213 OFFICE O/P EST LOW 20 MIN: CPT | Performed by: FAMILY MEDICINE

## 2024-03-22 RX ORDER — BROMPHENIRAMINE MALEATE, PSEUDOEPHEDRINE HYDROCHLORIDE, AND DEXTROMETHORPHAN HYDROBROMIDE 2; 30; 10 MG/5ML; MG/5ML; MG/5ML
SYRUP ORAL
COMMUNITY
Start: 2023-12-02 | End: 2024-03-22

## 2024-03-22 RX ORDER — DEXLANSOPRAZOLE 30 MG/1
30 CAPSULE, DELAYED RELEASE ORAL DAILY
Qty: 90 CAPSULE | Refills: 0 | Status: SHIPPED | OUTPATIENT
Start: 2024-03-22

## 2024-03-22 RX ORDER — HYDROCODONE BITARTRATE AND ACETAMINOPHEN 5; 325 MG/1; MG/1
TABLET ORAL SEE ADMIN INSTRUCTIONS
COMMUNITY
Start: 2024-01-05 | End: 2024-03-22

## 2024-03-22 RX ORDER — CHLORHEXIDINE GLUCONATE ORAL RINSE 1.2 MG/ML
SOLUTION DENTAL
COMMUNITY
Start: 2024-01-03 | End: 2024-03-22

## 2024-03-22 RX ORDER — DOXYCYCLINE HYCLATE 100 MG/1
1 CAPSULE ORAL EVERY 12 HOURS SCHEDULED
COMMUNITY
Start: 2023-12-02 | End: 2024-03-22

## 2024-03-22 RX ORDER — PREDNISONE 10 MG/1
TABLET ORAL SEE ADMIN INSTRUCTIONS
COMMUNITY
Start: 2023-12-02 | End: 2024-03-22

## 2024-03-22 RX ORDER — BUPROPION HYDROCHLORIDE 100 MG/1
100 TABLET, EXTENDED RELEASE ORAL 2 TIMES DAILY
Qty: 180 TABLET | Refills: 0 | Status: SHIPPED | OUTPATIENT
Start: 2024-03-22

## 2024-03-22 RX ORDER — LEVONORGESTREL AND ETHINYL ESTRADIOL 100-20(84)
1 KIT ORAL DAILY
Qty: 90 TABLET | Refills: 0 | Status: SHIPPED | OUTPATIENT
Start: 2024-03-22

## 2024-05-16 ENCOUNTER — OFFICE VISIT (OUTPATIENT)
Dept: OBSTETRICS AND GYNECOLOGY | Facility: CLINIC | Age: 44
End: 2024-05-16
Payer: COMMERCIAL

## 2024-05-16 VITALS
SYSTOLIC BLOOD PRESSURE: 142 MMHG | BODY MASS INDEX: 29.96 KG/M2 | WEIGHT: 186.4 LBS | DIASTOLIC BLOOD PRESSURE: 80 MMHG | HEIGHT: 66 IN

## 2024-05-16 DIAGNOSIS — Z12.4 SCREENING FOR CERVICAL CANCER: ICD-10-CM

## 2024-05-16 DIAGNOSIS — Z12.31 ENCOUNTER FOR SCREENING MAMMOGRAM FOR MALIGNANT NEOPLASM OF BREAST: ICD-10-CM

## 2024-05-16 DIAGNOSIS — Z30.41 ENCOUNTER FOR SURVEILLANCE OF CONTRACEPTIVE PILLS: ICD-10-CM

## 2024-05-16 DIAGNOSIS — Z01.419 WELL WOMAN EXAM WITH ROUTINE GYNECOLOGICAL EXAM: Primary | ICD-10-CM

## 2024-05-16 RX ORDER — ACETAMINOPHEN AND CODEINE PHOSPHATE 120; 12 MG/5ML; MG/5ML
1 SOLUTION ORAL DAILY
Qty: 28 TABLET | Refills: 12 | Status: SHIPPED | OUTPATIENT
Start: 2024-05-16 | End: 2025-05-16

## 2024-05-16 NOTE — PROGRESS NOTES
"Subjective   Chief Complaint   Patient presents with    Gynecologic Exam     Last pap done 12/15/22-WNL, MMG is due, C/O tenderness in left breast       Vielka Jennings is a 44 y.o. year old  presenting to be seen for her annual gynecological exam.   No complaints  She is using Seasonique for cycle suppression and history of endometriosis  She is still smoking but trying to quit with wellbutrin  Intermittent  discomfort left breast for the past month. Has not felt breast lump. No nipple discharge. No injury to breast  Last mammogram . No family history of breast cancer     Past Medical History:   Diagnosis Date    Abnormal Pap smear of cervix     Acid reflux     Body piercing     EARS    Bronchitis 10/03/2022    has residual cough and nasal congestion (assessed 11/3/22)    Diverticulitis     Heart murmur     REPORTS \"MURMUR AS A BABY\"    IBS (irritable bowel syndrome)     REPORTS PAST HISTORY, NO CURRENT ISSUES    Irritable bowel syndrome with diarrhea 2016    Port-wine stain     Seasonal allergies         Current Outpatient Medications:     buPROPion SR (Wellbutrin SR) 100 MG 12 hr tablet, Take 1 tablet by mouth 2 (Two) Times a Day., Disp: 180 tablet, Rfl: 0    dexlansoprazole (Dexilant) 30 MG capsule, Take 1 capsule by mouth Daily., Disp: 90 capsule, Rfl: 0    ipratropium-albuterol (DUO-NEB) 0.5-2.5 mg/3 ml nebulizer, USE 3 ML VIA NEBULIZER EVERY 6 HOURS AS NEEDED FOR WHEEZING, Disp: 360 mL, Rfl: 1    norethindrone (MICRONOR) 0.35 MG tablet, Take 1 tablet by mouth Daily., Disp: 28 tablet, Rfl: 12   Allergies   Allergen Reactions    Levaquin [Levofloxacin] Hives    Sulfa Antibiotics Hives    Zithromax [Azithromycin] Diarrhea      Past Surgical History:   Procedure Laterality Date    ANKLE SURGERY Left     REPORTS LIGAMENT REPAIR, THEN LATER HAD STITCHES REMOVED (1 YEAR POST OP)    CERVICAL BIOPSY  W/ LOOP ELECTRODE EXCISION      CHOLECYSTECTOMY      COLONOSCOPY N/A 2022    Procedure: " "COLONOSCOPY WITH POLYPECTOMY X3;  Surgeon: Tico Manzano MD;  Location: [facility] ENDOSCOPY;  Service: Gastroenterology;  Laterality: N/A;    D & C HYSTEROSCOPY N/A 4/3/2023    Procedure: DILATATION AND CURETTAGE HYSTEROSCOPY;  Surgeon: Candy Knight MD;  Location: [facility] OR;  Service: Obstetrics/Gynecology;  Laterality: N/A;    DILATATION AND CURETTAGE      ENDOSCOPY      HYSTEROSCOPY ENDOMETRIAL ABLATION      LEEP N/A 08/24/2018    Procedure: LOOP ELECTROCAUTERY EXCISION PROCEDURE;  Surgeon: Candy Knight MD;  Location: [facility] OR;  Service: Obstetrics/Gynecology    OTHER SURGICAL HISTORY      AFTER DELIVERY OF DAUGHTER REPORTS SURGICAL INTERVENTION FROM \"TEAR THAT WAS INTERNAL\"    SKIN BIOPSY      benign      Social History     Socioeconomic History    Marital status:    Tobacco Use    Smoking status: Every Day     Current packs/day: 0.50     Average packs/day: 0.5 packs/day for 24.4 years (12.2 ttl pk-yrs)     Types: Cigarettes     Start date: 2000     Passive exposure: Current    Smokeless tobacco: Never   Vaping Use    Vaping status: Never Used   Substance and Sexual Activity    Alcohol use: No    Drug use: No    Sexual activity: Defer      Family History   Problem Relation Age of Onset    Cancer Mother     Hyperlipidemia Mother     Melanoma Mother     Hyperlipidemia Father     Hypertension Father     Hyperlipidemia Brother     Hypertension Brother     Thyroid disease Maternal Aunt     Hyperlipidemia Maternal Grandmother     Hyperlipidemia Maternal Grandfather     Heart attack Paternal Grandmother     Hyperlipidemia Paternal Grandmother     Heart attack Paternal Grandfather     Hyperlipidemia Paternal Grandfather        Review of Systems   Constitutional:  Negative for chills, diaphoresis and fever.   Gastrointestinal: Negative.    Genitourinary:  Negative for difficulty urinating, dysuria, menstrual problem and pelvic pain.           Objective   /80   Ht 167.6 cm (66\")   Wt 84.6 kg (186 lb 6.4 " oz)   LMP 03/16/2024   BMI 30.09 kg/m²     Physical Exam  Exam conducted with a chaperone present.   Constitutional:       Appearance: Normal appearance. She is well-developed and well-groomed.   Eyes:      General: Lids are normal.      Extraocular Movements: Extraocular movements intact.      Conjunctiva/sclera: Conjunctivae normal.   Neck:      Thyroid: No thyroid mass.   Chest:   Breasts:     Breasts are symmetrical.      Right: No inverted nipple, mass, nipple discharge, skin change or tenderness.      Left: No inverted nipple, mass, nipple discharge, skin change or tenderness.   Abdominal:      General: There is no distension.      Palpations: Abdomen is soft. There is no hepatomegaly or splenomegaly.      Tenderness: There is no abdominal tenderness.   Genitourinary:     Exam position: Lithotomy position.      Labia:         Right: No rash, tenderness or lesion.         Left: No rash, tenderness or lesion.       Urethra: No prolapse, urethral pain, urethral swelling or urethral lesion.      Vagina: No vaginal discharge, tenderness or lesions.      Cervix: No cervical motion tenderness, discharge, friability or lesion.      Uterus: Not enlarged and not tender.       Adnexa:         Right: No mass or tenderness.          Left: No mass or tenderness.     Musculoskeletal:      Cervical back: Neck supple.   Lymphadenopathy:      Upper Body:      Right upper body: No axillary adenopathy.      Left upper body: No axillary adenopathy.   Skin:     General: Skin is warm and dry.      Findings: No lesion.   Neurological:      General: No focal deficit present.      Mental Status: She is alert and oriented to person, place, and time.   Psychiatric:         Attention and Perception: Attention normal.         Mood and Affect: Mood normal.         Speech: Speech normal.         Behavior: Behavior normal.         Thought Content: Thought content normal.            Result Review :                   Assessment and  Plan  Diagnoses and all orders for this visit:    1. Well woman exam with routine gynecological exam (Primary)    2. Screening for cervical cancer  -     LIQUID-BASED PAP SMEAR WITH HPV GENOTYPING REGARDLESS OF INTERPRETATION ([facility])    3. Encounter for screening mammogram for malignant neoplasm of breast  -     Mammo Screening Digital Tomosynthesis Bilateral With CAD; Future    4. Encounter for surveillance of contraceptive pills    Other orders  -     norethindrone (MICRONOR) 0.35 MG tablet; Take 1 tablet by mouth Daily.  Dispense: 28 tablet; Refill: 12      Patient Instructions   Self breast exam monthly  Regular exercise    Discussed changing to progesterone only ocp as she smokes. Patient agreeable to try this             This note was electronically signed.    Sarah Biggs PA-C   May 16, 2024

## 2024-05-16 NOTE — PATIENT INSTRUCTIONS
Self breast exam monthly  Regular exercise    Discussed changing to progesterone only ocp as she smokes. Patient agreeable to try this

## 2024-05-21 LAB — REF LAB TEST METHOD: NORMAL

## 2024-06-17 DIAGNOSIS — F17.200 TOBACCO USE DISORDER: ICD-10-CM

## 2024-06-17 RX ORDER — BUPROPION HYDROCHLORIDE 100 MG/1
100 TABLET, EXTENDED RELEASE ORAL 2 TIMES DAILY
Qty: 180 TABLET | Refills: 0 | Status: SHIPPED | OUTPATIENT
Start: 2024-06-17 | End: 2024-06-21 | Stop reason: SDUPTHER

## 2024-06-18 RX ORDER — LEVONORGESTREL AND ETHINYL ESTRADIOL 100-20(84)
1 KIT ORAL DAILY
Qty: 91 TABLET | OUTPATIENT
Start: 2024-06-18

## 2024-06-21 ENCOUNTER — OFFICE VISIT (OUTPATIENT)
Dept: FAMILY MEDICINE CLINIC | Facility: CLINIC | Age: 44
End: 2024-06-21
Payer: COMMERCIAL

## 2024-06-21 VITALS
DIASTOLIC BLOOD PRESSURE: 78 MMHG | BODY MASS INDEX: 29.51 KG/M2 | OXYGEN SATURATION: 95 % | SYSTOLIC BLOOD PRESSURE: 138 MMHG | WEIGHT: 183.6 LBS | HEART RATE: 82 BPM | HEIGHT: 66 IN

## 2024-06-21 DIAGNOSIS — M65.331 TRIGGER FINGER, RIGHT MIDDLE FINGER: ICD-10-CM

## 2024-06-21 DIAGNOSIS — K21.9 GASTROESOPHAGEAL REFLUX DISEASE WITHOUT ESOPHAGITIS: Primary | ICD-10-CM

## 2024-06-21 DIAGNOSIS — F17.200 TOBACCO USE DISORDER: ICD-10-CM

## 2024-06-21 DIAGNOSIS — R03.0 ELEVATED BLOOD PRESSURE READING WITHOUT DIAGNOSIS OF HYPERTENSION: ICD-10-CM

## 2024-06-21 DIAGNOSIS — L65.0 TELOGEN EFFLUVIUM: ICD-10-CM

## 2024-06-21 RX ORDER — BUPROPION HYDROCHLORIDE 100 MG/1
100 TABLET, EXTENDED RELEASE ORAL 2 TIMES DAILY
Qty: 180 TABLET | Refills: 3 | Status: SHIPPED | OUTPATIENT
Start: 2024-06-21

## 2024-06-21 RX ORDER — SPIRONOLACTONE 25 MG/1
25 TABLET ORAL DAILY
Qty: 90 TABLET | Refills: 2 | Status: SHIPPED | OUTPATIENT
Start: 2024-06-21

## 2024-06-21 RX ORDER — DEXLANSOPRAZOLE 30 MG/1
30 CAPSULE, DELAYED RELEASE ORAL DAILY
Qty: 90 CAPSULE | Refills: 3 | Status: SHIPPED | OUTPATIENT
Start: 2024-06-21

## 2024-06-21 RX ORDER — TRIAMCINOLONE ACETONIDE 1 MG/G
CREAM TOPICAL
COMMUNITY
Start: 2024-05-24

## 2024-06-21 RX ORDER — CEFDINIR 300 MG/1
1 CAPSULE ORAL EVERY 12 HOURS SCHEDULED
COMMUNITY
Start: 2024-05-17 | End: 2024-06-21

## 2024-06-21 NOTE — PROGRESS NOTES
"    Established Patient        Chief Complaint:   Chief Complaint   Patient presents with     Diverticulitis        Vielka Jennings is a 44 y.o. female    History of Present Illness:         Subjective     The following portions of the patient's history were reviewed and updated as appropriate: allergies, current medications, past family history, past medical history, past social history, past surgical history and problem list.    Allergies   Allergen Reactions    Levaquin [Levofloxacin] Hives    Sulfa Antibiotics Hives    Zithromax [Azithromycin] Diarrhea       Review of Systems  Constitutional: Negative for fever. Negative for chills, diaphoresis, fatigue and unexpected weight change.   HENT: No dysphagia; no changes to vision/hearing/smell/taste; no epistaxis.   Eyes: Negative for redness and visual disturbance.   Respiratory: Cough as per above.  Cardiovascular: Negative for chest pain and palpitations.   Gastrointestinal: No BRB/BTS.  Tolerating all p.o. intake.  Increased reflux symptoms.  Endocrine: Negative for cold intolerance and heat intolerance.   Genitourinary: Mild burning with urination, increased frequency.  No hematuria.  Musculoskeletal: Negative for arthralgias, back pain and myalgias.   Skin: Negative for color change, rash and wound.   Neurological: Negative for syncope, weakness and headaches.   Hematological: Negative for adenopathy. Does not bruise/bleed easily.   Psychiatric/Behavioral: Negative for confusion. The patient is not nervous/anxious.  Would like assistance with tobacco cessation efforts as per above.    Objective     Physical Exam   Vital Signs: /78   Pulse 82   Ht 167.6 cm (66\")   Wt 83.3 kg (183 lb 9.6 oz)   SpO2 95%   BMI 29.63 kg/m²     General Appearance: alert, oriented x 3, no acute distress.  Skin: warm and dry.  Known port wine stain to the right face area  HEENT: Atraumatic.  pupils round and reactive to light and accommodation, oral mucosa pink and " moist.  Nares patent without epistaxis.  External auditory canals are patent tympanic membranes intact.   Neck: supple, no JVD, trachea midline.  No thyromegaly  Lungs: Rhonchus, referred upper airway congestion that is cleared with cough, unlabored breathing effort.  Trace end expiratory wheezes bilaterally.  Audible air exchange noted all lung fields.  Heart: RRR, normal S1 and S2, no S3, no rub.  Abdomen: soft, no palpable bladder, present bowel sounds to auscultation ×4.  No guarding or rigidity.  No CVA tenderness.  Without areas of focal tenderness.  Extremities: no clubbing, cyanosis or edema.  Good range of motion actively and passively.  Symmetric muscle strength and development  Neuro: normal speech and mental status.  Cranial nerves II through XII intact.  No anosmia. DTR 2+; proprioception intact.  No focal motor/sensory deficits.        Assessment and Plan      Assessment:   Diagnoses and all orders for this visit:    1. Gastroesophageal reflux disease without esophagitis (Primary)  -     dexlansoprazole (Dexilant) 30 MG capsule; Take 1 capsule by mouth Daily.  Dispense: 90 capsule; Refill: 3    2. Tobacco use disorder  -     buPROPion SR (WELLBUTRIN SR) 100 MG 12 hr tablet; Take 1 tablet by mouth 2 (Two) Times a Day.  Dispense: 180 tablet; Refill: 3    3. Telogen effluvium  -     spironolactone (Aldactone) 25 MG tablet; Take 1 tablet by mouth Daily.  Dispense: 90 tablet; Refill: 2  -     TSH  -     T4, Free  -     Basic Metabolic Panel  -     Basic Metabolic Panel; Future    4. Trigger finger, right middle finger  -     Ambulatory Referral to Orthopedic Surgery    5. Elevated blood pressure reading without diagnosis of hypertension  -     TSH  -     T4, Free  -     Basic Metabolic Panel  -     Basic Metabolic Panel; Future        Plan:      Start trial of spironolactone.    Referral to ortho for u/s guided trigger finger injection.    Refilled dexilant.            Discussion Summary:    Discussed plan  of care in detail with pt today; pt verb understanding and agrees.    I spent 30 minutes caring for Vielka on this date of service. This time includes time spent by me in the following activities:preparing for the visit, performing a medically appropriate examination and/or evaluation , counseling and educating the patient/family/caregiver, ordering medications, tests, or procedures, documenting information in the medical record, and care coordination    I have reviewed and updated all copied forward information, as appropriate.  I attest to the accuracy and relevance of any unchanged information.    Follow up:  No follow-ups on file.     There are no Patient Instructions on file for this visit.    Patricio Bass DO  06/21/24  10:25 EDT    Please note that portions of this note may have been completed with a voice recognition program. Efforts were made to edit the dictations, but occasionally words are mistranscribed.

## 2024-06-22 LAB
BUN SERPL-MCNC: 8 MG/DL (ref 6–20)
BUN/CREAT SERPL: 11.1 (ref 7–25)
CALCIUM SERPL-MCNC: 9.8 MG/DL (ref 8.6–10.5)
CHLORIDE SERPL-SCNC: 104 MMOL/L (ref 98–107)
CO2 SERPL-SCNC: 24.8 MMOL/L (ref 22–29)
CREAT SERPL-MCNC: 0.72 MG/DL (ref 0.57–1)
EGFRCR SERPLBLD CKD-EPI 2021: 106.5 ML/MIN/1.73
GLUCOSE SERPL-MCNC: 79 MG/DL (ref 65–99)
POTASSIUM SERPL-SCNC: 4.8 MMOL/L (ref 3.5–5.2)
SODIUM SERPL-SCNC: 139 MMOL/L (ref 136–145)
T4 FREE SERPL-MCNC: 1.41 NG/DL (ref 0.92–1.68)
TSH SERPL DL<=0.005 MIU/L-ACNC: 1.46 UIU/ML (ref 0.27–4.2)

## 2024-06-22 NOTE — PROGRESS NOTES
Established Patient        Chief Complaint:   Chief Complaint   Patient presents with    Follow-up     3mo follow up for GERD.    Hypertension    Ear Fullness        Vielka Jennings is a 44 y.o. female    History of Present Illness:   Here today and scheduled follow-up visit of her GERD.  Symptoms well-controlled with Dexilant.  Patient also complains of a painful right third finger, trigger phenomenon.  Denies any single episode of trauma or injury.  Trigger phenomenon does awaken her at night, she is right arm dominant.  Also noticed periodic elevations to her blood pressure.    Patient also complains of easy hair shedding, no areas of total hair loss to the scalp.    Denies chest pain, syncope, palpitations or vertigo.  Denies fever, chills or night sweats.  Maintains an active lifestyle, balanced dietary intake; reports good hydration habits.  Denies hematuria/dysuria.  Denies any BRB/BTS.  Denies orthopnea, epistaxis or hemoptysis.        Subjective     The following portions of the patient's history were reviewed and updated as appropriate: allergies, current medications, past family history, past medical history, past social history, past surgical history and problem list.    Allergies   Allergen Reactions    Levaquin [Levofloxacin] Hives    Sulfa Antibiotics Hives    Zithromax [Azithromycin] Diarrhea       Review of Systems  Constitutional: Negative for fever. Negative for chills, diaphoresis, fatigue and unexpected weight change.   HENT: No dysphagia; no changes to vision/hearing/smell/taste; no epistaxis.  Otherwise, as per above.  Eyes: Negative for redness and visual disturbance.   Respiratory: negative for shortness of breath. Negative for chest pain . Negative for cough and chest tightness.   Cardiovascular: Negative for chest pain and palpitations.   Gastrointestinal: Negative for abdominal distention, abdominal pain and blood in stool.   Endocrine: Negative for cold  "intolerance and heat intolerance.   Genitourinary: Negative for difficulty urinating, dysuria and frequency.   Musculoskeletal: As per above.  Skin: Easy hair shedding as per above.  Neurological: Negative for syncope, weakness and headaches.   Hematological: Negative for adenopathy. Does not bruise/bleed easily.   Psychiatric/Behavioral: Negative for confusion. The patient is not nervous/anxious.    Objective     Physical Exam   Vital Signs: /78   Pulse 82   Ht 167.6 cm (66\")   Wt 83.3 kg (183 lb 9.6 oz)   SpO2 95%   BMI 29.63 kg/m²   BMI is >= 25 and <30. (Overweight) The following options were offered after discussion;: exercise counseling/recommendations and nutrition counseling/recommendations  Patient's (Body mass index is 29.63 kg/m².) indicates that they are overweight (BMI 25-29.9) with health related conditions that include GERD . Weight is unchanged. BMI is is above average; BMI management plan is completed. We discussed portion control and increasing exercise.      General Appearance: alert, oriented x 3, no acute distress.  Skin: warm and dry.  Known port wine stain to the right face area.  Tug test demonstrates easy hair shedding.  No areas of alopecia areata or totalis.  HEENT: Atraumatic.  pupils round and reactive to light and accommodation, oral mucosa pink and moist.  Nares patent without epistaxis.  External auditory canals are patent tympanic membranes intact.  Serous effusion noted bilateral tympanic membranes, slight erythema.  Decreased mobility on Valsalva/insufflation.  Neck: supple, no JVD, trachea midline.  No thyromegaly  Lungs: CTA, unlabored breathing effort.  Heart: RRR, normal S1 and S2, no S3, no rub.  Abdomen: soft, non-tender, no palpable bladder, present bowel sounds to auscultation ×4.  No guarding or rigidity.  No CVA tenderness.  Extremities: no clubbing, cyanosis or edema.  Good range of motion actively and passively.  Symmetric muscle strength and development.  " Nodular area to the flexor tendon of right hand, third phalanx, just proximal to the A1 avery.  Tender to palpation, trigger phenomenon obvious.  Neuro: normal speech and mental status.  Cranial nerves II through XII intact.  No anosmia. DTR 2+; proprioception intact.  No focal motor/sensory deficits.    Advance Care Planning   ACP discussion was held with the patient during this visit. Patient does not have an advance directive, information provided.       Assessment and Plan      Assessment/Plan:   Diagnoses and all orders for this visit:    1. Gastroesophageal reflux disease without esophagitis (Primary)  -     dexlansoprazole (Dexilant) 30 MG capsule; Take 1 capsule by mouth Daily.  Dispense: 90 capsule; Refill: 3    2. Tobacco use disorder  -     buPROPion SR (WELLBUTRIN SR) 100 MG 12 hr tablet; Take 1 tablet by mouth 2 (Two) Times a Day.  Dispense: 180 tablet; Refill: 3    3. Telogen effluvium  -     spironolactone (Aldactone) 25 MG tablet; Take 1 tablet by mouth Daily.  Dispense: 90 tablet; Refill: 2  -     TSH  -     T4, Free  -     Basic Metabolic Panel  -     Basic Metabolic Panel; Future    4. Trigger finger, right middle finger  -     Ambulatory Referral to Orthopedic Surgery    5. Elevated blood pressure reading without diagnosis of hypertension  -     TSH  -     T4, Free  -     Basic Metabolic Panel  -     Basic Metabolic Panel; Future      Begin trial of spironolactone to aid in hair growth, as well as to aid in blood pressure regulation.  Surveillance BMP today, this will be repeated in 2 weeks.  Patient verbalized understanding.    Vital signs demonstrate a slight elevation of blood pressure, not at ideal goal.  Spironolactone should be of benefit for this.  I have asked patient to notify the office should she develop any ill effects to the medication.    Refill provided for Dexilant.  Anti - reflux measures, trigger foods and drinks to avoid: Fatty foods, alcohol, chocolate, coffee, tea, caffeinated  soft drinks (decaffeinated coffee still has some caffeine), peppermint and spearmint, spices and vinegar, citrus fruits and juices, tomatoes and tomato sauces, and smoking. Other antireflux measures include weight reduction if overweight, avoid tight clothing around the abdomen, elevate the head of her bed 6 inches (May use a bed wedge which is placed between the mattress in box [place]) or blocks under the head of the bed, don't drink or eat for 2 hours before going to bed and avoid lying down immediately after meals.    Refill bupropion today.    Discussed need for stress/anxiety reducing techniques such as prayer/meditation/breathing and counting exercises and avoidance of stress producing environments/situations; will follow clinically.    Referral to orthopedic surgery for trigger finger injection.      Discussion Summary:    Discussed plan of care in detail with pt today; pt verb understanding and agrees.    I spent 40 minutes caring for Vielka on this date of service. This time includes time spent by me in the following activities:preparing for the visit, performing a medically appropriate examination and/or evaluation , counseling and educating the patient/family/caregiver, ordering medications, tests, or procedures, referring and communicating with other health care professionals , documenting information in the medical record, and care coordination    I have reviewed and updated all copied forward information, as appropriate.  I attest to the accuracy and relevance of any unchanged information.    Follow up:  Return in about 3 months (around 9/21/2024) for Recheck, Med Change/New Meds.     There are no Patient Instructions on file for this visit.        Patricio Bass DO  06/21/24  22:26 EDT

## 2024-06-27 ENCOUNTER — OFFICE VISIT (OUTPATIENT)
Dept: ORTHOPEDIC SURGERY | Facility: CLINIC | Age: 44
End: 2024-06-27
Payer: COMMERCIAL

## 2024-06-27 VITALS — TEMPERATURE: 98.2 F | WEIGHT: 183 LBS | HEIGHT: 66 IN | BODY MASS INDEX: 29.41 KG/M2

## 2024-06-27 DIAGNOSIS — R20.2 RIGHT HAND PARESTHESIA: ICD-10-CM

## 2024-06-27 DIAGNOSIS — M65.331 TRIGGER MIDDLE FINGER OF RIGHT HAND: Primary | ICD-10-CM

## 2024-06-27 DIAGNOSIS — M89.9 LESION OF BONE OF RIGHT HAND: ICD-10-CM

## 2024-06-27 PROCEDURE — 99204 OFFICE O/P NEW MOD 45 MIN: CPT | Performed by: PHYSICIAN ASSISTANT

## 2024-06-27 NOTE — PROGRESS NOTES
"Subjective   Patient ID: Vielka Jennings is a 44 y.o. right hand dominant female is being seen for orthopaedic evaluation today for right hand   Pain of the Right Hand (Reports pain of the right hand and a locking with the middle finger. Has been going on \"for a few months\". No known injury)         Pain  Associated symptoms include arthralgias (right hand).     Patient presents for the evaluation of right hand arthralgia and associated locking of the right long finger.  She states this has been ongoing for several months.  No injury or trauma.  She does mention occasional numbness and tingling to the lateral aspect of the right hand.                                                  Past Medical History:   Diagnosis Date    Abnormal Pap smear of cervix     Acid reflux     Body piercing     EARS    Bronchitis 10/03/2022    has residual cough and nasal congestion (assessed 11/3/22)    Diverticulitis     Heart murmur     REPORTS \"MURMUR AS A BABY\"    IBS (irritable bowel syndrome)     REPORTS PAST HISTORY, NO CURRENT ISSUES    Irritable bowel syndrome with diarrhea 06/03/2016    Port-wine stain     Seasonal allergies         Past Surgical History:   Procedure Laterality Date    ANKLE SURGERY Left     REPORTS LIGAMENT REPAIR, THEN LATER HAD STITCHES REMOVED (1 YEAR POST OP)    CERVICAL BIOPSY  W/ LOOP ELECTRODE EXCISION      CHOLECYSTECTOMY      COLONOSCOPY N/A 11/04/2022    Procedure: COLONOSCOPY WITH POLYPECTOMY X3;  Surgeon: Tico Manzano MD;  Location: [facility] ENDOSCOPY;  Service: Gastroenterology;  Laterality: N/A;    D & C HYSTEROSCOPY N/A 04/03/2023    Procedure: DILATATION AND CURETTAGE HYSTEROSCOPY;  Surgeon: Candy Knight MD;  Location: [facility] OR;  Service: Obstetrics/Gynecology;  Laterality: N/A;    DILATATION AND CURETTAGE      ENDOSCOPY      FOOT SURGERY      surgery to repair torn ligaments in left ankle in 2010/2011    HYSTEROSCOPY ENDOMETRIAL ABLATION      LEEP N/A 08/24/2018    Procedure: " "LOOP ELECTROCAUTERY EXCISION PROCEDURE;  Surgeon: Candy Knight MD;  Location: [facility];  Service: Obstetrics/Gynecology    OTHER SURGICAL HISTORY      AFTER DELIVERY OF DAUGHTER REPORTS SURGICAL INTERVENTION FROM \"TEAR THAT WAS INTERNAL\"    SKIN BIOPSY      benign       Family History   Problem Relation Age of Onset    Cancer Mother     Hyperlipidemia Mother     Melanoma Mother     Hyperlipidemia Father     Hypertension Father     Hyperlipidemia Brother     Hypertension Brother     Thyroid disease Maternal Aunt     Hyperlipidemia Maternal Grandmother     Hyperlipidemia Maternal Grandfather     Heart attack Paternal Grandmother     Hyperlipidemia Paternal Grandmother     Heart attack Paternal Grandfather     Hyperlipidemia Paternal Grandfather         Social History     Socioeconomic History    Marital status:    Tobacco Use    Smoking status: Every Day     Current packs/day: 0.50     Average packs/day: 0.5 packs/day for 24.6 years (12.3 ttl pk-yrs)     Types: Cigarettes     Start date: 1/1/2000     Passive exposure: Current    Smokeless tobacco: Never   Vaping Use    Vaping status: Never Used   Substance and Sexual Activity    Alcohol use: No    Drug use: No    Sexual activity: Not Currently     Partners: Male     Birth control/protection: Birth control pill       Allergies   Allergen Reactions    Levaquin [Levofloxacin] Hives    Sulfa Antibiotics Hives    Zithromax [Azithromycin] Diarrhea       Review of Systems   Musculoskeletal:  Positive for arthralgias (right hand).   All other systems reviewed and are negative.      Objective   Temp 98.2 °F (36.8 °C)   Ht 167.6 cm (66\")   Wt 83 kg (183 lb)   BMI 29.54 kg/m²   Physical Exam  Vitals and nursing note reviewed.   Constitutional:       Appearance: Normal appearance.   Pulmonary:      Effort: Pulmonary effort is normal.   Musculoskeletal:      Right wrist: No deformity, snuff box tenderness or crepitus. Normal pulse.      Right hand: Tenderness (3rd " digit A1 pulley with locking noted) present. No deformity. Normal capillary refill. Normal pulse.      Comments: + tinel test RIght wrist  Patient is able to make a composite fist     Neurological:      Mental Status: She is alert and oriented to person, place, and time.       Ortho Exam    Neurologic Exam     Mental Status   Oriented to person, place, and time.            Assessment & Plan   Independent Review of Radiographic Studies:    X-ray of the right finger 3 view performed in the clinic independently reviewed for the evaluation of arthralgia.  No comparison films available to review.  No acute fracture or dislocation.  Of note, there is fifth distal metacarpal lucent central medullary lesions with mild scalloping medial border.      Procedures  [] No procedures were performed in office today.    Diagnoses and all orders for this visit:    1. Trigger middle finger of right hand (Primary)  -     XR Finger 2+ View Right; Future  -     MRI hand right w wo contrast; Future    2. Right hand paresthesia  -     MRI hand right w wo contrast; Future    3. Lesion of bone of right hand  -     MRI hand right w wo contrast; Future         Recommendations/Plan:  Exercise, medications, injections, other patient advice, and return appointment as noted.  Patient is encouraged to call or return for any issues or concerns.    Follow up after MRI  I suspect the central medullary lucent lesions are most consistent with enchondroma versus osteochondroma, however, I would like to order MRI to better assess given her history of tobacco use.  Patient agreeable to call or return sooner for any concerns.  Patient was provided a Velcro wrist brace to use day and night for 2 weeks.  I also provided her with aluminum finger splint to prevent flexion of the right third digit to be used at night.  We also discussed changing the ergonomics of her workspace to better accommodate likely carpal tunnel syndrome.    My original plan was to order  an EMG of the right upper extremity.  However, patient states she would not be able to take off work to have the EMG.    Patient would be willing to have a MRI of the right hand to better assess the fifth metacarpal incidental findings.

## 2024-07-17 ENCOUNTER — HOSPITAL ENCOUNTER (OUTPATIENT)
Facility: HOSPITAL | Age: 44
Discharge: HOME | End: 2024-07-17
Admitting: PHYSICIAN ASSISTANT
Payer: COMMERCIAL

## 2024-07-17 DIAGNOSIS — M89.9 LESION OF BONE OF RIGHT HAND: ICD-10-CM

## 2024-07-17 DIAGNOSIS — M65.331 TRIGGER MIDDLE FINGER OF RIGHT HAND: ICD-10-CM

## 2024-07-17 DIAGNOSIS — R20.2 RIGHT HAND PARESTHESIA: ICD-10-CM

## 2024-07-17 PROCEDURE — A9577 INJ MULTIHANCE: HCPCS | Performed by: PHYSICIAN ASSISTANT

## 2024-07-17 PROCEDURE — 0 GADOBENATE DIMEGLUMINE 529 MG/ML SOLUTION: Performed by: PHYSICIAN ASSISTANT

## 2024-07-17 PROCEDURE — 73220 MRI UPPR EXTREMITY W/O&W/DYE: CPT

## 2024-07-17 RX ADMIN — GADOBENATE DIMEGLUMINE 17 ML: 529 INJECTION, SOLUTION INTRAVENOUS at 14:48

## 2024-07-19 ENCOUNTER — HOSPITAL ENCOUNTER (EMERGENCY)
Facility: HOSPITAL | Age: 44
Discharge: HOME | End: 2024-07-19
Attending: EMERGENCY MEDICINE
Payer: COMMERCIAL

## 2024-07-19 ENCOUNTER — APPOINTMENT (OUTPATIENT)
Dept: ULTRASOUND IMAGING | Facility: HOSPITAL | Age: 44
End: 2024-07-19
Payer: COMMERCIAL

## 2024-07-19 VITALS
WEIGHT: 180 LBS | TEMPERATURE: 98.2 F | BODY MASS INDEX: 28.93 KG/M2 | OXYGEN SATURATION: 95 % | HEART RATE: 84 BPM | DIASTOLIC BLOOD PRESSURE: 102 MMHG | HEIGHT: 66 IN | RESPIRATION RATE: 18 BRPM | SYSTOLIC BLOOD PRESSURE: 150 MMHG

## 2024-07-19 DIAGNOSIS — I80.8 SUPERFICIAL THROMBOPHLEBITIS OF LEFT UPPER EXTREMITY: Primary | ICD-10-CM

## 2024-07-19 PROCEDURE — 93971 EXTREMITY STUDY: CPT

## 2024-07-19 PROCEDURE — 99284 EMERGENCY DEPT VISIT MOD MDM: CPT

## 2024-07-19 RX ORDER — ASPIRIN 81 MG/1
81 TABLET ORAL DAILY
Qty: 7 TABLET | Refills: 0 | Status: SHIPPED | OUTPATIENT
Start: 2024-07-19 | End: 2024-07-26

## 2024-07-19 RX ORDER — CEPHALEXIN 500 MG/1
500 CAPSULE ORAL 4 TIMES DAILY
Qty: 28 CAPSULE | Refills: 0 | Status: SHIPPED | OUTPATIENT
Start: 2024-07-19 | End: 2024-07-22

## 2024-07-19 RX ORDER — NAPROXEN 500 MG/1
500 TABLET ORAL 2 TIMES DAILY WITH MEALS
Qty: 30 TABLET | Refills: 0 | Status: SHIPPED | OUTPATIENT
Start: 2024-07-19

## 2024-07-20 NOTE — ED PROVIDER NOTES
" EMERGENCY DEPARTMENT ENCOUNTER    Pt Name: Vielka Jennings  MRN: [id number]  Pt :   1980  Room Number:  CDU1/01  Date of encounter:  2024  PCP: Patricio Bass DO  ED Provider: Vini Reddy MD    Historian: Patient      HPI:  Chief Complaint   Patient presents with    Abscess          Context: Vielka Jennings is a 44 y.o. female who presents to the ED c/o left arm pain and swelling, the patient reports yesterday she had an MRI with IV contrast, since that time she has had pain and swelling.  Denies fevers, redness.      PAST MEDICAL HISTORY  Past Medical History:   Diagnosis Date    Abnormal Pap smear of cervix     Acid reflux     Body piercing     EARS    Bronchitis 10/03/2022    has residual cough and nasal congestion (assessed 11/3/22)    Diverticulitis     Heart murmur     REPORTS \"MURMUR AS A BABY\"    IBS (irritable bowel syndrome)     REPORTS PAST HISTORY, NO CURRENT ISSUES    Irritable bowel syndrome with diarrhea 2016    Port-wine stain     Seasonal allergies          PAST SURGICAL HISTORY  Past Surgical History:   Procedure Laterality Date    ANKLE SURGERY Left     REPORTS LIGAMENT REPAIR, THEN LATER HAD STITCHES REMOVED (1 YEAR POST OP)    CERVICAL BIOPSY  W/ LOOP ELECTRODE EXCISION      CHOLECYSTECTOMY      COLONOSCOPY N/A 2022    Procedure: COLONOSCOPY WITH POLYPECTOMY X3;  Surgeon: Tico Manzano MD;  Location: [facility] ENDOSCOPY;  Service: Gastroenterology;  Laterality: N/A;    D & C HYSTEROSCOPY N/A 2023    Procedure: DILATATION AND CURETTAGE HYSTEROSCOPY;  Surgeon: Candy Knight MD;  Location: [facility] OR;  Service: Obstetrics/Gynecology;  Laterality: N/A;    DILATATION AND CURETTAGE      ENDOSCOPY      FOOT SURGERY      surgery to repair torn ligaments in left ankle in     HYSTEROSCOPY ENDOMETRIAL ABLATION      LEEP N/A 2018    Procedure: LOOP ELECTROCAUTERY EXCISION PROCEDURE;  Surgeon: Candy Knight MD;  Location: [facility]" "OR;  Service: Obstetrics/Gynecology    OTHER SURGICAL HISTORY      AFTER DELIVERY OF DAUGHTER REPORTS SURGICAL INTERVENTION FROM \"TEAR THAT WAS INTERNAL\"    SKIN BIOPSY      benign         FAMILY HISTORY  Family History   Problem Relation Age of Onset    Cancer Mother     Hyperlipidemia Mother     Melanoma Mother     Hyperlipidemia Father     Hypertension Father     Hyperlipidemia Brother     Hypertension Brother     Thyroid disease Maternal Aunt     Hyperlipidemia Maternal Grandmother     Hyperlipidemia Maternal Grandfather     Heart attack Paternal Grandmother     Hyperlipidemia Paternal Grandmother     Heart attack Paternal Grandfather     Hyperlipidemia Paternal Grandfather          SOCIAL HISTORY  Social History     Socioeconomic History    Marital status:    Tobacco Use    Smoking status: Every Day     Current packs/day: 0.50     Average packs/day: 0.5 packs/day for 24.6 years (12.3 ttl pk-yrs)     Types: Cigarettes     Start date: 1/1/2000     Passive exposure: Current    Smokeless tobacco: Never   Vaping Use    Vaping status: Never Used   Substance and Sexual Activity    Alcohol use: No    Drug use: No    Sexual activity: Not Currently     Partners: Male     Birth control/protection: Birth control pill         ALLERGIES  Gadolinium derivatives (mr contrast), Levaquin [levofloxacin], Sulfa antibiotics, and Zithromax [azithromycin]        REVIEW OF SYSTEMS  Review of Systems   Constitutional:  Negative for chills and fever.   HENT:  Negative for sore throat and trouble swallowing.    Eyes:  Negative for pain and redness.   Respiratory:  Negative for cough and shortness of breath.    Cardiovascular:  Negative for chest pain and leg swelling.   Gastrointestinal:  Negative for abdominal pain, nausea and vomiting.   Genitourinary:  Negative for dysuria and urgency.   Musculoskeletal:  Negative for back pain and neck pain.        Left upper extremity swelling   Skin:  Negative for rash and wound. "   Neurological:  Negative for dizziness and weakness.        All systems reviewed and negative except for those discussed in HPI.       PHYSICAL EXAM    I have reviewed the triage vital signs and nursing notes.    ED Triage Vitals [07/19/24 1600]   Temp Heart Rate Resp BP SpO2   98.1 °F (36.7 °C) 89 18 159/89 100 %      Temp src Heart Rate Source Patient Position BP Location FiO2 (%)   Oral Monitor Sitting Left arm --       Physical Exam  Constitutional:       Appearance: Normal appearance. She is not ill-appearing.   HENT:      Head: Normocephalic and atraumatic.      Right Ear: External ear normal.      Left Ear: External ear normal.      Nose: Nose normal.      Mouth/Throat:      Mouth: Mucous membranes are moist.      Pharynx: Oropharynx is clear.   Eyes:      Extraocular Movements: Extraocular movements intact.      Conjunctiva/sclera: Conjunctivae normal.      Pupils: Pupils are equal, round, and reactive to light.   Cardiovascular:      Rate and Rhythm: Normal rate and regular rhythm.      Pulses:           Radial pulses are 2+ on the right side and 2+ on the left side.   Pulmonary:      Effort: Pulmonary effort is normal.      Breath sounds: Normal breath sounds.   Abdominal:      General: There is no distension.      Palpations: Abdomen is soft.      Tenderness: There is no abdominal tenderness.   Musculoskeletal:         General: No tenderness or deformity. Normal range of motion.        Arms:       Cervical back: Normal range of motion and neck supple.      Right lower leg: No edema.      Left lower leg: No edema.   Skin:     General: Skin is warm and dry.      Capillary Refill: Capillary refill takes less than 2 seconds.   Neurological:      General: No focal deficit present.      Mental Status: She is alert and oriented to person, place, and time.            LAB RESULTS  No results found for this or any previous visit (from the past 24 hour(s)).    If labs were ordered, I independently reviewed the  results and considered them in treating the patient.        RADIOLOGY  US Venous Doppler Upper Extremity Left (duplex)    Result Date: 7/19/2024  FINAL REPORT TECHNIQUE: Graded compression and a standard color Doppler and grayscale ultrasound images of the left upper extremity  venous system were obtained. CLINICAL HISTORY: swelling, pain FINDINGS: FINDINGS:  There is thrombus in the cephalic vein. The other veins are within normal limits.     Cephalic vein thrombus. Authenticated and Electronically Signed by Harjit Fuller MD on 07/19/2024 08:01:10 PM         PROCEDURES    Procedures    Interpretations    O2 Sat: The patients oxygen saturation was 95% on Room Air.  This was independently interpreted by me as Normal        Radiology: I ordered and independently reviewed the above noted radiographic studies.  I viewed images of  DVT study of the left upper extremity  which showed  no DVT  per my independent interpretation. See radiologist's dictation for official interpretation.         MEDICATIONS GIVEN IN ER    Medications - No data to display      MEDICAL DECISION MAKING, PROGRESS, and CONSULTS    All labs, if obtained, have been independently reviewed by me.  All radiology studies, if obtained, have been reviewed by me and the radiologist dictating the report.  All EKG's, if obtained, have been independently viewed and interpreted by me      Discussion below represents my analysis of pertinent findings related to patient's condition, differential diagnosis, treatment plan and final disposition.      Differential diagnosis:    44-year-old female with left upper extremity pain and swelling, got IV contrast during an MRI, suspect contrast devastation, versus superficial thrombophlebitis, certainly possible to have a DVT, will obtain DVT study of the left lower extremity    Additional Sources:  None      Orders placed during this visit:  Orders Placed This Encounter   Procedures    US Venous Doppler Upper  Extremity Left (duplex)         Additional orders considered but not ordered:  None    ED Course:    Consultants:  None    ED Course as of 07/19/24 2101 Fri Jul 19, 2024 2002 DVT study shows cephalic vein thrombosis, discussed with the patient the diagnosis of superficial thrombophlebitis, advised antibiotics, warm compress, PCP follow-up.  The patient voiced understanding is agreeable to plan for discharge home [CS]      ED Course User Index  [CS] Vini Reddy MD           After my consideration of clinical presentation and any laboratory/radiology studies obtained, I discussed the findings with the patient/patient representative who is in agreement with the treatment plan and the final disposition. Risks and benefits of discharge were discussed.     AS OF 21:01 EDT VITALS:    BP - (!) 150/102  HR - 84  TEMP - 98.2 °F (36.8 °C) (Oral)  O2 SATS - 95%    I reviewed the patients prescription monitoring report if available prior to discharge    DIAGNOSIS  Final diagnoses:   Superficial thrombophlebitis of left upper extremity         DISPOSITION  ED Disposition       ED Disposition   Discharge    Condition   Stable    Comment   --                   Please note that portions of this document were completed with voice recognition software.        Vini Reddy MD  07/19/24 2110

## 2024-07-22 ENCOUNTER — OFFICE VISIT (OUTPATIENT)
Dept: ORTHOPEDIC SURGERY | Facility: CLINIC | Age: 44
End: 2024-07-22
Payer: COMMERCIAL

## 2024-07-22 ENCOUNTER — OFFICE VISIT (OUTPATIENT)
Dept: FAMILY MEDICINE CLINIC | Facility: CLINIC | Age: 44
End: 2024-07-22
Payer: COMMERCIAL

## 2024-07-22 VITALS
WEIGHT: 190.25 LBS | DIASTOLIC BLOOD PRESSURE: 80 MMHG | SYSTOLIC BLOOD PRESSURE: 140 MMHG | OXYGEN SATURATION: 98 % | HEIGHT: 66 IN | RESPIRATION RATE: 16 BRPM | HEART RATE: 84 BPM | BODY MASS INDEX: 30.58 KG/M2 | TEMPERATURE: 98 F

## 2024-07-22 VITALS — BODY MASS INDEX: 29.09 KG/M2 | HEIGHT: 66 IN | TEMPERATURE: 98.3 F | WEIGHT: 181 LBS

## 2024-07-22 DIAGNOSIS — D16.11 ENCHONDROMA OF BONE OF HAND, RIGHT: ICD-10-CM

## 2024-07-22 DIAGNOSIS — M65.331 TRIGGER MIDDLE FINGER OF RIGHT HAND: Primary | ICD-10-CM

## 2024-07-22 DIAGNOSIS — I82.612: ICD-10-CM

## 2024-07-22 DIAGNOSIS — I80.8 SUPERFICIAL THROMBOPHLEBITIS OF LEFT UPPER EXTREMITY: Primary | ICD-10-CM

## 2024-07-22 PROCEDURE — 99213 OFFICE O/P EST LOW 20 MIN: CPT | Performed by: PHYSICIAN ASSISTANT

## 2024-07-22 PROCEDURE — 20550 NJX 1 TENDON SHEATH/LIGAMENT: CPT | Performed by: PHYSICIAN ASSISTANT

## 2024-07-22 PROCEDURE — 99214 OFFICE O/P EST MOD 30 MIN: CPT | Performed by: FAMILY MEDICINE

## 2024-07-22 RX ORDER — TRIAMCINOLONE ACETONIDE 40 MG/ML
10 INJECTION, SUSPENSION INTRA-ARTICULAR; INTRAMUSCULAR
Status: COMPLETED | OUTPATIENT
Start: 2024-07-22 | End: 2024-07-22

## 2024-07-22 RX ORDER — METHYLPREDNISOLONE ACETATE 80 MG/ML
80 INJECTION, SUSPENSION INTRA-ARTICULAR; INTRALESIONAL; INTRAMUSCULAR; SOFT TISSUE ONCE
Status: SHIPPED | OUTPATIENT
Start: 2024-07-22

## 2024-07-22 RX ORDER — LIDOCAINE HYDROCHLORIDE 20 MG/ML
0.5 INJECTION, SOLUTION INFILTRATION; PERINEURAL
Status: COMPLETED | OUTPATIENT
Start: 2024-07-22 | End: 2024-07-22

## 2024-07-22 RX ADMIN — TRIAMCINOLONE ACETONIDE 10 MG: 40 INJECTION, SUSPENSION INTRA-ARTICULAR; INTRAMUSCULAR at 09:40

## 2024-07-22 RX ADMIN — LIDOCAINE HYDROCHLORIDE 0.5 ML: 20 INJECTION, SOLUTION INFILTRATION; PERINEURAL at 09:40

## 2024-07-22 NOTE — PROGRESS NOTES
"Subjective   Patient ID: Vielka Jennings is a 44 y.o. right hand dominant female  Follow-up of the Right Hand (Here to review MRI results.)         Follow-upCurrent symptoms include no chest pain, no confusion, no shortness of breath and no abdominal pain.       Patient presents to review MRI results of right hand and to discuss treatment options for right 3rd digit trigger finger.   She is still experiencing locking of the right middle finger.               Past Medical History:   Diagnosis Date    Abnormal Pap smear of cervix     Acid reflux     Body piercing     EARS    Bronchitis 10/03/2022    has residual cough and nasal congestion (assessed 11/3/22)    Diverticulitis     Heart murmur     REPORTS \"MURMUR AS A BABY\"    IBS (irritable bowel syndrome)     REPORTS PAST HISTORY, NO CURRENT ISSUES    Irritable bowel syndrome with diarrhea 06/03/2016    Port-wine stain     Seasonal allergies         Past Surgical History:   Procedure Laterality Date    ANKLE SURGERY Left     REPORTS LIGAMENT REPAIR, THEN LATER HAD STITCHES REMOVED (1 YEAR POST OP)    CERVICAL BIOPSY  W/ LOOP ELECTRODE EXCISION      CHOLECYSTECTOMY      COLONOSCOPY N/A 11/04/2022    Procedure: COLONOSCOPY WITH POLYPECTOMY X3;  Surgeon: Tico Manzano MD;  Location: [facility] ENDOSCOPY;  Service: Gastroenterology;  Laterality: N/A;    D & C HYSTEROSCOPY N/A 04/03/2023    Procedure: DILATATION AND CURETTAGE HYSTEROSCOPY;  Surgeon: Candy Knight MD;  Location: [facility] OR;  Service: Obstetrics/Gynecology;  Laterality: N/A;    DILATATION AND CURETTAGE      ENDOSCOPY      FOOT SURGERY      surgery to repair torn ligaments in left ankle in 2010/2011    HYSTEROSCOPY ENDOMETRIAL ABLATION      LEEP N/A 08/24/2018    Procedure: LOOP ELECTROCAUTERY EXCISION PROCEDURE;  Surgeon: Candy Knight MD;  Location: [facility] OR;  Service: Obstetrics/Gynecology    OTHER SURGICAL HISTORY      AFTER DELIVERY OF DAUGHTER REPORTS SURGICAL INTERVENTION FROM \"TEAR THAT WAS " "INTERNAL\"    SKIN BIOPSY      benign       Family History   Problem Relation Age of Onset    Cancer Mother     Hyperlipidemia Mother     Melanoma Mother     Hyperlipidemia Father     Hypertension Father     Hyperlipidemia Brother     Hypertension Brother     Thyroid disease Maternal Aunt     Hyperlipidemia Maternal Grandmother     Hyperlipidemia Maternal Grandfather     Heart attack Paternal Grandmother     Hyperlipidemia Paternal Grandmother     Heart attack Paternal Grandfather     Hyperlipidemia Paternal Grandfather        Social History     Socioeconomic History    Marital status:    Tobacco Use    Smoking status: Every Day     Current packs/day: 0.50     Average packs/day: 0.5 packs/day for 24.7 years (12.3 ttl pk-yrs)     Types: Cigarettes     Start date: 1/1/2000     Passive exposure: Current    Smokeless tobacco: Never   Vaping Use    Vaping status: Never Used   Substance and Sexual Activity    Alcohol use: No    Drug use: No    Sexual activity: Not Currently     Partners: Male     Birth control/protection: Birth control pill         Current Outpatient Medications:     aspirin 81 MG EC tablet, Take 1 tablet by mouth Daily for 7 days., Disp: 7 tablet, Rfl: 0    buPROPion SR (WELLBUTRIN SR) 100 MG 12 hr tablet, Take 1 tablet by mouth 2 (Two) Times a Day., Disp: 180 tablet, Rfl: 3    cephalexin (KEFLEX) 500 MG capsule, Take 1 capsule by mouth 4 (Four) Times a Day for 7 days., Disp: 28 capsule, Rfl: 0    dexlansoprazole (Dexilant) 30 MG capsule, Take 1 capsule by mouth Daily., Disp: 90 capsule, Rfl: 3    ipratropium-albuterol (DUO-NEB) 0.5-2.5 mg/3 ml nebulizer, USE 3 ML VIA NEBULIZER EVERY 6 HOURS AS NEEDED FOR WHEEZING, Disp: 360 mL, Rfl: 1    metroNIDAZOLE (METROCREAM) 0.75 % cream, APPLY TOPICALLY TO FACE EVERY NIGHT AT BEDTIME, Disp: , Rfl:     naproxen (EC NAPROSYN) 500 MG EC tablet, Take 1 tablet by mouth 2 (Two) Times a Day With Meals., Disp: 30 tablet, Rfl: 0    norethindrone (MICRONOR) 0.35 MG " "tablet, Take 1 tablet by mouth Daily., Disp: 28 tablet, Rfl: 12    spironolactone (Aldactone) 25 MG tablet, Take 1 tablet by mouth Daily., Disp: 90 tablet, Rfl: 2    triamcinolone (KENALOG) 0.1 % cream, APPLY TOPICALLY TO NECK AND FEET ONCE DAILY, Disp: , Rfl:     Allergies   Allergen Reactions    Gadolinium Derivatives (Mr Contrast) Itching     Immediately upon receiving 17ml of multihance, patient experienced itching of the throat.  No other symptoms were present.    Levaquin [Levofloxacin] Hives    Sulfa Antibiotics Hives    Zithromax [Azithromycin] Diarrhea       Review of Systems   Constitutional:  Negative for fever.   HENT:  Negative for dental problem and voice change.    Eyes:  Negative for visual disturbance.   Respiratory:  Negative for shortness of breath.    Cardiovascular:  Negative for chest pain.   Gastrointestinal:  Negative for abdominal pain.   Genitourinary:  Negative for dysuria.   Musculoskeletal:  Positive for arthralgias (right hand). Negative for gait problem and joint swelling.   Skin:  Negative for rash.   Neurological:  Negative for speech difficulty.   Hematological:  Does not bruise/bleed easily.   Psychiatric/Behavioral:  Negative for confusion.        I have reviewed the medical and surgical history, family history, social history, medications, and/or allergies, and the review of systems of this report.    Objective   Temp 98.3 °F (36.8 °C)   Ht 167.6 cm (65.98\")   Wt 82.1 kg (181 lb)   BMI 29.23 kg/m²    Physical Exam  Vitals and nursing note reviewed.   Constitutional:       Appearance: Normal appearance.   Pulmonary:      Effort: Pulmonary effort is normal.   Musculoskeletal:      Right wrist: No bony tenderness. Normal pulse.      Right hand: No swelling or deformity. Normal range of motion. Normal capillary refill. Normal pulse.   Neurological:      Mental Status: She is alert and oriented to person, place, and time.       Ortho Exam     Neurologic Exam     Mental Status "   Oriented to person, place, and time.        + ttp right third A1 pulley with locking.          Assessment & Plan   Independent Review of Radiographic Studies:    No new imaging done today.  Narrative & Impression      MRI HAND RIGHT W WO CONTRAST     Date of Exam: 7/17/2024 2:05 PM EDT     Indication: right hand 5th MC lucent lesions.     Comparison: 6/27/2024 radiographs     Technique:  Routine multiplanar/multisequence sequence images of the right hand were obtained before and after the uneventful administration of 17 mL Multihance.          Findings:  Within the mid and distal fifth metacarpal, there is a T1 isointense, T2 hyperintense intramedullary lesion measuring 2.8 x 1.2 x 1.4 cm on series 8 image 11 and series 3 image 33. There is an expansile component to this lesion with mild thinning of the   endosteum, however no evidence of extraosseous expansion or cortical breakthrough. Narrow zone of transition. Internal septations are present. There is peripheral enhancement. No intra-articular extension.     No evidence of fracture. Flexor and extensor tendons appear intact. There is no evidence of significant fatty muscle atrophy. No significant degenerative changes. No soft tissue mass or fluid collection.     IMPRESSION:  Impression:  2.8 x 1.2 x 1.4 cm peripherally enhancing intramedullary lesion within the mid/distal fifth metacarpal. Imaging findings, in comparison with recent radiograph, are consistent with enchondroma. No suspicious MRI features to suggest low-grade   chondrosarcoma at this time.       Right A1 pulley trigger finger injection    Date/Time: 7/22/2024 9:40 AM    Performed by: Estrada Chatterjee PA-C  Authorized by: Estrada Chatterjee PA-C  Consent: Verbal consent obtained. Written consent obtained.  Risks and benefits: risks, benefits and alternatives were discussed  Consent given by: patient  Patient understanding: patient states understanding of the procedure being  "performed  Patient consent: the patient's understanding of the procedure matches consent given  Procedure consent: procedure consent matches procedure scheduled  Relevant documents: relevant documents present and verified  Test results: test results available and properly labeled  Site marked: the operative site was marked  Imaging studies: imaging studies available  Required items: required blood products, implants, devices, and special equipment available  Patient identity confirmed: verbally with patient  Time out: Immediately prior to procedure a \"time out\" was called to verify the correct patient, procedure, equipment, support staff and site/side marked as required.  Preparation: Patient was prepped and draped in the usual sterile fashion.  Local anesthesia used: yes    Anesthesia:  Local anesthesia used: yes  Local Anesthetic: lidocaine 2% without epinephrine  Anesthetic total: 0.5 mL    Sedation:  Patient sedated: no    Medications administered: 10 mg triamcinolone acetonide 40 MG/ML; 0.5 mL lidocaine 2%             Diagnoses and all orders for this visit:    1. Trigger middle finger of right hand (Primary)    2. Enchondroma of bone of hand, right    Other orders  -     - Injection Tendon or Ligament       Risk, benefits, and merits of treatment alternatives reviewed with the patient and questions answered  The nature of the proposed surgery reviewed with the patient including risks, benefits, rehabilitation, recovery timeframe, and outcome expectations  Ice, heat, and/or modalities as beneficial    Recommendations/Plan:  Patient is encouraged to call or return for any issues or concerns.    D/w patient option of A1 pulley release versus cortisone injection into the A1 pulley.  Patient states that she would rather try a cortisone injection followed by a night splint.      Patient agreeable to call or return sooner for any concerns.                      EMR Dragon-transcription disclaimer:  This encounter note " is an electronic transcription of spoken language to printed text.  Electronic transcription of spoken language may permit erroneous or at times nonsensical words or phrases to be inadvertently transcribed.  Although I have reviewed the note for such errors, some may still exist

## 2024-07-23 NOTE — PROGRESS NOTES
Established Patient        Chief Complaint:   Chief Complaint   Patient presents with    Follow-up     ER Follow up for Superficial thrombophlebitis of left upper extremity        Vielka Jennings is a 44 y.o. female    History of Present Illness:   Here today in evaluation of recent emergency room visit, patient presented initially at that time, 7/19/2024, with complaints of left upper arm edema and erythema, significant discomfort additionally.  Patient gives a significant history of MRI with contrast, IV access point was same affected upper extremity.  Symptoms began shortly after finishing MRI.  They did progressively worsen, MRI with contrast was on 7/17/2024.    In the emergency room, patient underwent vascular study of the left upper extremity which demonstrated findings consistent with superficial thrombophlebitis, as well as a venous thromboembolism of the cephalic vein.  Started on anti-inflammatory medications, as well as daily aspirin therapy.  Instructions to follow-up with primary care physician within 2 to 3 days.  She was also started on antibiotics.    She denies any fever, chills or night sweats.  Denies chest pain, syncope, palpitations or vertigo.  No orthopnea.  Denies epistaxis or hemoptysis.  No hematuria.  Denies any BRB/BTS.    The edema, discoloration and pain have all improved over the course of the last few days.        Subjective     The following portions of the patient's history were reviewed and updated as appropriate: allergies, current medications, past family history, past medical history, past social history, past surgical history and problem list.    Allergies   Allergen Reactions    Gadolinium Derivatives (Mr Contrast) Itching     Immediately upon receiving 17ml of multihance, patient experienced itching of the throat.  No other symptoms were present.    Levaquin [Levofloxacin] Hives    Sulfa Antibiotics Hives    Zithromax [Azithromycin] Diarrhea  "      Review of Systems  Constitutional: Negative for fever. Negative for chills, diaphoresis, fatigue and unexpected weight change.   HENT: No dysphagia; no changes to vision/hearing/smell/taste; no epistaxis.  Otherwise, as per above.  Eyes: Negative for redness and visual disturbance.   Respiratory: negative for shortness of breath. Negative for chest pain . Negative for cough and chest tightness.   Cardiovascular: Negative for chest pain and palpitations.   Gastrointestinal: Negative for abdominal distention, abdominal pain and blood in stool.   Endocrine: Negative for cold intolerance and heat intolerance.   Genitourinary: Negative for difficulty urinating, dysuria and frequency.   Musculoskeletal: As per above to left upper extremity.  Skin: No new rashes or lesions.  Neurological: Negative for syncope, weakness and headaches.   Hematological: Negative for adenopathy. Does not bruise/bleed easily.   Psychiatric/Behavioral: Negative for confusion. The patient is not nervous/anxious.    Objective     Physical Exam   Vital Signs: /80   Pulse 84   Temp 98 °F (36.7 °C) (Temporal)   Resp 16   Ht 167.6 cm (65.98\")   Wt 86.3 kg (190 lb 4 oz)   SpO2 98%   BMI 30.72 kg/m²   BMI is >= 25 and <30. (Overweight) The following options were offered after discussion;: exercise counseling/recommendations and nutrition counseling/recommendations  Patient's (Body mass index is 30.72 kg/m².) indicates that they are overweight (BMI 25-29.9) with health related conditions that include GERD . Weight is unchanged. BMI is is above average; BMI management plan is completed. We discussed portion control and increasing exercise.      General Appearance: alert, oriented x 3, no acute distress.  Skin: warm and dry.  Known port wine stain to the right face area.  Tug test demonstrates easy hair shedding.  No areas of alopecia areata or totalis.  HEENT: Atraumatic.  pupils round and reactive to light and accommodation, oral " mucosa pink and moist.  Nares patent without epistaxis.  External auditory canals are patent tympanic membranes intact.  Serous effusion noted bilateral tympanic membranes, slight erythema.  Decreased mobility on Valsalva/insufflation.  Neck: supple, no JVD, trachea midline.  No thyromegaly  Lungs: CTA, unlabored breathing effort.  Heart: RRR, normal S1 and S2, no S3, no rub.  Abdomen: soft, non-tender, no palpable bladder, present bowel sounds to auscultation ×4.  No guarding or rigidity.  No CVA tenderness.  Extremities: no clubbing, cyanosis or edema.  Good range of motion actively and passively.  Symmetric muscle strength and development.  Nodular area to the flexor tendon of right hand, third phalanx, just proximal to the A1 avery.  Tender to palpation, trigger phenomenon obvious.  Mild prominence to the cephalic vein of the left upper extremity, particularly upper arm.  No appreciable edematous changes.  Normal extension/flexion at the elbows, normal supination/pronation of the forearms.  Symmetric  strength bilaterally.  Neuro: normal speech and mental status.  Cranial nerves II through XII intact.  No anosmia. DTR 2+; proprioception intact.  No focal motor/sensory deficits.        Assessment and Plan      Assessment/Plan:   Diagnoses and all orders for this visit:    1. Superficial thrombophlebitis of left upper extremity (Primary)  -     methylPREDNISolone acetate (DEPO-medrol) injection 80 mg    2. Acute cephalic vein thrombosis, left      Continue daily aspirin therapy.  Utilize moist heat compress to affected area.  Left upper extremity edema has profoundly improved over the course of the last few days.    Signs demonstrate hemodynamic stability.  No increased work of breathing.    Symptoms to the left upper extremity were most likely the result of IV contrast for MRI study.  I recommended that she notify the office should her symptoms not resolved.      Discussion Summary:    Discussed plan of care  in detail with pt today; pt verb understanding and agrees.    I spent 30 minutes caring for Vielka on this date of service. This time includes time spent by me in the following activities:preparing for the visit, reviewing tests, obtaining and/or reviewing a separately obtained history, performing a medically appropriate examination and/or evaluation , counseling and educating the patient/family/caregiver, ordering medications, tests, or procedures, documenting information in the medical record, and care coordination    I confirm accuracy of unchanged data/findings which have been carried forward from previous visit, as well as I have updated appropriately those that have changed.      Follow up:  No follow-ups on file.     There are no Patient Instructions on file for this visit.        Patricio Bass DO  07/22/24  22:25 EDT

## 2024-07-31 ENCOUNTER — OFFICE VISIT (OUTPATIENT)
Dept: FAMILY MEDICINE CLINIC | Facility: CLINIC | Age: 44
End: 2024-07-31
Payer: COMMERCIAL

## 2024-07-31 VITALS
OXYGEN SATURATION: 98 % | HEIGHT: 66 IN | HEART RATE: 104 BPM | WEIGHT: 186 LBS | RESPIRATION RATE: 16 BRPM | SYSTOLIC BLOOD PRESSURE: 134 MMHG | BODY MASS INDEX: 29.89 KG/M2 | TEMPERATURE: 98 F | DIASTOLIC BLOOD PRESSURE: 82 MMHG

## 2024-07-31 DIAGNOSIS — T45.0X5A ALLERGY INJECTION REACTION, INITIAL ENCOUNTER: ICD-10-CM

## 2024-07-31 DIAGNOSIS — T80.89XA ALLERGY INJECTION REACTION, INITIAL ENCOUNTER: ICD-10-CM

## 2024-07-31 DIAGNOSIS — R60.0 LOCALIZED EDEMA: ICD-10-CM

## 2024-07-31 PROCEDURE — 99213 OFFICE O/P EST LOW 20 MIN: CPT | Performed by: NURSE PRACTITIONER

## 2024-07-31 RX ORDER — AMOXICILLIN AND CLAVULANATE POTASSIUM 875; 125 MG/1; MG/1
1 TABLET, FILM COATED ORAL EVERY 12 HOURS SCHEDULED
Qty: 14 TABLET | Refills: 0 | Status: SHIPPED | OUTPATIENT
Start: 2024-07-31 | End: 2024-08-07

## 2024-07-31 RX ORDER — CEPHALEXIN 750 MG/1
750 CAPSULE ORAL 2 TIMES DAILY
Qty: 20 CAPSULE | Refills: 0 | Status: SHIPPED | OUTPATIENT
Start: 2024-07-31 | End: 2024-07-31

## 2024-07-31 RX ORDER — FAMOTIDINE 20 MG/1
20 TABLET, FILM COATED ORAL 2 TIMES DAILY PRN
Qty: 10 TABLET | Refills: 0 | Status: SHIPPED | OUTPATIENT
Start: 2024-07-31

## 2024-07-31 RX ORDER — PREDNISONE 10 MG/1
TABLET ORAL
Qty: 15 TABLET | Refills: 0 | Status: SHIPPED | OUTPATIENT
Start: 2024-07-31

## 2024-07-31 NOTE — PROGRESS NOTES
"                      Established Patient        Chief Complaint:   Chief Complaint   Patient presents with    Allergic Reaction     Pt is here for a reaction she had to an injection she was given from here on 7/22.          History of Present Illness:    Vielka Jennings is a 44 y.o. female who presents today for concern of allergic reaction vs cellulitis.    methylPREDNISolone acetate (DEPO-medrol) injection 80 mg on 7/22 in the right hip. Warmth, redness, Itching, and burning at the site which has progressively worsened. Started as the size of a quarter. Has grown larger.     Has taken benadryl, anti-itch cream, warm compress.     Just finished keflex prescribed for blood clot in arm    Subjective     The following portions of the patient's history were reviewed and updated as appropriate: allergies, current medications, past family history, past medical history, past social history, past surgical history and problem list.    ALLERGIES  Allergies   Allergen Reactions    Gadolinium Derivatives (Mr Contrast) Itching     Immediately upon receiving 17ml of multihance, patient experienced itching of the throat.  No other symptoms were present.    Levaquin [Levofloxacin] Hives    Sulfa Antibiotics Hives    Zithromax [Azithromycin] Diarrhea       ROS  Review of Systems   Musculoskeletal:  Positive for myalgias.   Skin:  Positive for color change and wound.       Objective     Vital Signs:   /82   Pulse 104   Temp 98 °F (36.7 °C)   Resp 16   Ht 167.6 cm (65.98\")   Wt 84.4 kg (186 lb)   SpO2 98%   BMI 30.04 kg/m²                Physical Exam   Physical Exam  Vitals and nursing note reviewed.   Cardiovascular:      Rate and Rhythm: Normal rate.   Pulmonary:      Effort: Pulmonary effort is normal.   Skin:     Findings: Abscess and erythema present. No rash.          Neurological:      Mental Status: She is alert and oriented to person, place, and time.   Psychiatric:         Mood and Affect: Mood normal. "         Behavior: Behavior normal.         Assessment and Plan      Assessment/Plan:   Diagnoses and all orders for this visit:    1. Localized edema  -     Discontinue: cephalexin (Keflex) 750 MG capsule; Take 1 capsule by mouth 2 (Two) Times a Day for 10 days.  Dispense: 20 capsule; Refill: 0  -     amoxicillin-clavulanate (AUGMENTIN) 875-125 MG per tablet; Take 1 tablet by mouth Every 12 (Twelve) Hours for 7 days.  Dispense: 14 tablet; Refill: 0  -     famotidine (Pepcid) 20 MG tablet; Take 1 tablet by mouth 2 (Two) Times a Day As Needed (hives).  Dispense: 10 tablet; Refill: 0    2. Allergy injection reaction, initial encounter  -     predniSONE (DELTASONE) 10 MG tablet; Take 5 tablets today, decrease dose by 1 tablet each day until gone. 5,4,3,2,1  Dispense: 15 tablet; Refill: 0  -     famotidine (Pepcid) 20 MG tablet; Take 1 tablet by mouth 2 (Two) Times a Day As Needed (hives).  Dispense: 10 tablet; Refill: 0    Risks, benefits, and potential side effects of current/new medications reviewed with patient.  Patient voiced understanding and wished to proceed with treatment.    The patient is advised to apply heat intermittently (avoid sleeping on heating pad).    Patient was encouraged to keep me informed of any acute changes, lack of improvement, or any new concerning symptoms.      Discussion Summary:  Discussed plan of care in detail with pt today; pt verb understanding and agrees.      I have reviewed and updated all copied forward information, as appropriate.  I attest to the accuracy and relevance of any unchanged information.      Follow up:  No follow-ups on file.     Patient Education:  There are no Patient Instructions on file for this visit.    SHER Vincent  08/15/24  13:02 EDT          Please note that portions of this note may have been completed with a voice recognition program.

## 2024-09-20 ENCOUNTER — OFFICE VISIT (OUTPATIENT)
Dept: FAMILY MEDICINE CLINIC | Facility: CLINIC | Age: 44
End: 2024-09-20
Payer: COMMERCIAL

## 2024-09-20 VITALS
WEIGHT: 187.2 LBS | DIASTOLIC BLOOD PRESSURE: 90 MMHG | HEART RATE: 80 BPM | SYSTOLIC BLOOD PRESSURE: 130 MMHG | BODY MASS INDEX: 30.08 KG/M2 | OXYGEN SATURATION: 97 % | HEIGHT: 66 IN

## 2024-09-20 DIAGNOSIS — I10 ESSENTIAL HYPERTENSION: ICD-10-CM

## 2024-09-20 DIAGNOSIS — K21.9 GASTROESOPHAGEAL REFLUX DISEASE WITHOUT ESOPHAGITIS: Primary | ICD-10-CM

## 2024-09-20 PROCEDURE — 99214 OFFICE O/P EST MOD 30 MIN: CPT | Performed by: FAMILY MEDICINE

## 2024-09-20 RX ORDER — NEBIVOLOL 5 MG/1
5 TABLET ORAL DAILY
Qty: 90 TABLET | Refills: 2 | Status: SHIPPED | OUTPATIENT
Start: 2024-09-20

## 2024-09-20 NOTE — PROGRESS NOTES
Established Patient        Chief Complaint:   Chief Complaint   Patient presents with    Hypertension     Patient reports getting leg cramps with new medication    Heartburn     Reports a lot better        Vielka Jennings is a 44 y.o. female    History of Present Illness:   Here today in scheduled follow-up visit of her GERD and hypertension.    Patient reports cramping to lower extremities with use of spironolactone.  She has discontinued the medication, cramps have stopped.    She reports resolution of her heartburn symptoms since starting Dexilant.  Denies aspiration/dysphagia.    Denies chest pain, syncope, palpitations or vertigo.  Denies fever, chills or night sweats.  Maintains an active lifestyle, balanced dietary intake; reports good hydration habits.  Denies hematuria/dysuria.  Denies any BRB/BTS.  No new rashes.  Denies orthopnea, epistaxis or hemoptysis.    Subjective     The following portions of the patient's history were reviewed and updated as appropriate: allergies, current medications, past family history, past medical history, past social history, past surgical history and problem list.    Allergies   Allergen Reactions    Gadolinium Derivatives (Mr Contrast) Itching     Immediately upon receiving 17ml of multihance, patient experienced itching of the throat.  No other symptoms were present.    Levaquin [Levofloxacin] Hives    Sulfa Antibiotics Hives    Zithromax [Azithromycin] Diarrhea       Review of Systems  Constitutional: Negative for fever. Negative for chills, diaphoresis, fatigue and unexpected weight change.   HENT: No dysphagia; no changes to vision/hearing/smell/taste; no epistaxis.  Otherwise, as per above.  Eyes: Negative for redness and visual disturbance.   Respiratory: negative for shortness of breath. Negative for chest pain . Negative for cough and chest tightness.   Cardiovascular: Negative for chest pain and palpitations.   Gastrointestinal:  "Negative for abdominal distention, abdominal pain and blood in stool.   Endocrine: Negative for cold intolerance and heat intolerance.   Genitourinary: Negative for difficulty urinating, dysuria and frequency.   Musculoskeletal: As per above to left upper extremity.  Skin: No new rashes or lesions.  Neurological: Negative for syncope, weakness and headaches.   Hematological: Negative for adenopathy. Does not bruise/bleed easily.   Psychiatric/Behavioral: Negative for confusion. The patient is not nervous/anxious.    Objective     Physical Exam   Vital Signs: /90   Pulse 80   Ht 167.6 cm (66\")   Wt 84.9 kg (187 lb 3.2 oz)   SpO2 97%   BMI 30.21 kg/m²   BMI is >= 25 and <30. (Overweight) The following options were offered after discussion;: exercise counseling/recommendations and nutrition counseling/recommendations  Patient's (Body mass index is 30.21 kg/m².) indicates that they are overweight (BMI 25-29.9) with health related conditions that include GERD . Weight is unchanged. BMI is is above average; BMI management plan is completed. We discussed portion control and increasing exercise.      General Appearance: alert, oriented x 3, no acute distress.  Skin: warm and dry.  Known port wine stain to the right face area.  Tug test demonstrates easy hair shedding.  No areas of alopecia areata or totalis.  HEENT: Atraumatic.  pupils round and reactive to light and accommodation, oral mucosa pink and moist.  Nares patent without epistaxis.  External auditory canals are patent tympanic membranes intact.  Serous effusion noted bilateral tympanic membranes, slight erythema.  Decreased mobility on Valsalva/insufflation.  Neck: supple, no JVD, trachea midline.  No thyromegaly  Lungs: CTA, unlabored breathing effort.  Heart: RRR, normal S1 and S2, no S3, no rub.  Abdomen: soft, non-tender, no palpable bladder, present bowel sounds to auscultation ×4.  No guarding or rigidity.  No CVA tenderness.  Extremities: no " clubbing, cyanosis or edema.  Good range of motion actively and passively.  Symmetric muscle strength and development.  Nodular area to the flexor tendon of right hand, third phalanx, just proximal to the A1 avery.  Tender to palpation, trigger phenomenon obvious.  Mild prominence to the cephalic vein of the left upper extremity, particularly upper arm.  No appreciable edematous changes.  Normal extension/flexion at the elbows, normal supination/pronation of the forearms.  Symmetric  strength bilaterally.  Neuro: normal speech and mental status.  Cranial nerves II through XII intact.  No anosmia. DTR 2+; proprioception intact.  No focal motor/sensory deficits.        Assessment and Plan      Assessment/Plan:   Diagnoses and all orders for this visit:    1. Gastroesophageal reflux disease without esophagitis (Primary)    2. Essential hypertension  -     nebivolol (BYSTOLIC) 5 MG tablet; Take 1 tablet by mouth Daily.  Dispense: 90 tablet; Refill: 2      Continue use of Dexilant.  Anti - reflux measures, trigger foods and drinks to avoid: Fatty foods, alcohol, chocolate, coffee, tea, caffeinated soft drinks (decaffeinated coffee still has some caffeine), peppermint and spearmint, spices and vinegar, citrus fruits and juices, tomatoes and tomato sauces, and smoking. Other antireflux measures include weight reduction if overweight, avoid tight clothing around the abdomen, elevate the head of her bed 6 inches (May use a bed wedge which is placed between the mattress in box [place]) or blocks under the head of the bed, don't drink or eat for 2 hours before going to bed and avoid lying down immediately after meals.    I have recommended discontinuation of spironolactone, begin once daily nebivolol.  I have asked patient to notify the office should she develop any ill effects to the medication.  I have asked that she continue to routinely monitor her blood pressure at home.  Discussed need for reduction in  sodium/salt/caffeine intake; improve sleep habits as able; inc formal CV exercise program with goal of vigorous activity most, if not all, days of the week; goal of 150 min of sustained HR CV exercise total per week.      Discussion Summary:    Discussed plan of care in detail with pt today; pt verb understanding and agrees.    I spent 30 minutes caring for Vielka on this date of service. This time includes time spent by me in the following activities:preparing for the visit, performing a medically appropriate examination and/or evaluation , counseling and educating the patient/family/caregiver, ordering medications, tests, or procedures, documenting information in the medical record, and care coordination    I confirm accuracy of unchanged data/findings which have been carried forward from previous visit, as well as I have updated appropriately those that have changed.        Follow up:  Return in about 3 months (around 12/20/2024) for Recheck, Med Change/New Meds.     There are no Patient Instructions on file for this visit.        Patricio Bass DO  09/20/24  11:27 EDT

## 2024-10-04 ENCOUNTER — HOSPITAL ENCOUNTER (OUTPATIENT)
Dept: MAMMOGRAPHY | Facility: HOSPITAL | Age: 44
Discharge: HOME | End: 2024-10-04
Admitting: PHYSICIAN ASSISTANT
Payer: COMMERCIAL

## 2024-10-04 DIAGNOSIS — Z12.31 ENCOUNTER FOR SCREENING MAMMOGRAM FOR MALIGNANT NEOPLASM OF BREAST: ICD-10-CM

## 2024-10-04 PROCEDURE — 77063 BREAST TOMOSYNTHESIS BI: CPT

## 2024-10-04 PROCEDURE — 77067 SCR MAMMO BI INCL CAD: CPT

## 2024-12-13 ENCOUNTER — OFFICE VISIT (OUTPATIENT)
Age: 44
End: 2024-12-13
Payer: COMMERCIAL

## 2024-12-13 VITALS
HEART RATE: 89 BPM | HEIGHT: 66 IN | SYSTOLIC BLOOD PRESSURE: 140 MMHG | DIASTOLIC BLOOD PRESSURE: 90 MMHG | WEIGHT: 191 LBS | BODY MASS INDEX: 30.7 KG/M2 | OXYGEN SATURATION: 96 %

## 2024-12-13 DIAGNOSIS — K21.9 GASTROESOPHAGEAL REFLUX DISEASE WITHOUT ESOPHAGITIS: ICD-10-CM

## 2024-12-13 DIAGNOSIS — Z23 NEED FOR PNEUMOCOCCAL VACCINE: ICD-10-CM

## 2024-12-13 DIAGNOSIS — I10 ESSENTIAL HYPERTENSION: Primary | ICD-10-CM

## 2024-12-13 PROCEDURE — 90471 IMMUNIZATION ADMIN: CPT | Performed by: FAMILY MEDICINE

## 2024-12-13 PROCEDURE — 99214 OFFICE O/P EST MOD 30 MIN: CPT | Performed by: FAMILY MEDICINE

## 2024-12-13 PROCEDURE — 90677 PCV20 VACCINE IM: CPT | Performed by: FAMILY MEDICINE

## 2024-12-13 RX ORDER — NEBIVOLOL 10 MG/1
10 TABLET ORAL DAILY
Qty: 90 TABLET | Refills: 3 | Status: SHIPPED | OUTPATIENT
Start: 2024-12-13

## 2024-12-13 RX ORDER — DEXLANSOPRAZOLE 30 MG/1
30 CAPSULE, DELAYED RELEASE ORAL DAILY
Qty: 90 CAPSULE | Refills: 3 | Status: SHIPPED | OUTPATIENT
Start: 2024-12-13

## 2024-12-13 NOTE — PROGRESS NOTES
Established Patient        Chief Complaint:   Chief Complaint   Patient presents with    Med Management     Medicine is okay but pt is consistently gaining weight        Vielka Jennings is a 44 y.o. female    History of Present Illness:   Here today in scheduled follow-up visit of hypertension and GERD.    She denies any problems with current medication regimen.    Denies chest pain, syncope, palpitations or vertigo.  Denies fever, chills or night sweats.  Maintains an active lifestyle, balanced dietary intake; reports good hydration habits.  Denies hematuria/dysuria.  Denies any BRB/BTS.  No new rashes.  Denies orthopnea, epistaxis or hemoptysis.    Subjective     The following portions of the patient's history were reviewed and updated as appropriate: allergies, current medications, past family history, past medical history, past social history, past surgical history and problem list.    Allergies   Allergen Reactions    Gadolinium Derivatives (Mr Contrast) Itching     Immediately upon receiving 17ml of multihance, patient experienced itching of the throat.  No other symptoms were present.    Levaquin [Levofloxacin] Hives    Sulfa Antibiotics Hives    Zithromax [Azithromycin] Diarrhea       Review of Systems  Constitutional: Negative for fever. Negative for chills, diaphoresis, fatigue and unexpected weight change.   HENT: No dysphagia; no changes to vision/hearing/smell/taste; no epistaxis.  Otherwise, as per above.  Eyes: Negative for redness and visual disturbance.   Respiratory: negative for shortness of breath. Negative for chest pain . Negative for cough and chest tightness.   Cardiovascular: Negative for chest pain and palpitations.   Gastrointestinal: Negative for abdominal distention, abdominal pain and blood in stool.   Endocrine: Negative for cold intolerance and heat intolerance.   Genitourinary: Negative for difficulty urinating, dysuria and frequency.   Musculoskeletal:  "As per above to left upper extremity.  Skin: No new rashes or lesions.  Neurological: Negative for syncope, weakness and headaches.   Hematological: Negative for adenopathy. Does not bruise/bleed easily.   Psychiatric/Behavioral: Negative for confusion. The patient is not nervous/anxious.    Objective     Physical Exam   Vital Signs: /90   Pulse 89   Ht 167.6 cm (66\")   Wt 86.6 kg (191 lb)   SpO2 96%   BMI 30.83 kg/m²   BMI is >= 25 and <30. (Overweight) The following options were offered after discussion;: exercise counseling/recommendations and nutrition counseling/recommendations  Patient's (Body mass index is 30.83 kg/m².) indicates that they are overweight (BMI 25-29.9) with health related conditions that include GERD . Weight is unchanged. BMI is is above average; BMI management plan is completed. We discussed portion control and increasing exercise.      General Appearance: alert, oriented x 3, no acute distress.  Skin: warm and dry.  Known port wine stain to the right face area.  Tug test demonstrates easy hair shedding.  No areas of alopecia areata or totalis.  HEENT: Atraumatic.  pupils round and reactive to light and accommodation, oral mucosa pink and moist.  Nares patent without epistaxis.  External auditory canals are patent tympanic membranes intact.  Serous effusion noted bilateral tympanic membranes, slight erythema.  Decreased mobility on Valsalva/insufflation.  Neck: supple, no JVD, trachea midline.  No thyromegaly  Lungs: CTA, unlabored breathing effort.  Heart: RRR, normal S1 and S2, no S3, no rub.  Abdomen: soft, non-tender, no palpable bladder, present bowel sounds to auscultation ×4.  No guarding or rigidity.  No CVA tenderness.  Extremities: no clubbing, cyanosis or edema.  Good range of motion actively and passively.  Symmetric muscle strength and development.  Nodular area to the flexor tendon of right hand, third phalanx, just proximal to the A1 avery.  Tender to palpation, " trigger phenomenon obvious.  Mild prominence to the cephalic vein of the left upper extremity, particularly upper arm.  No appreciable edematous changes.  Normal extension/flexion at the elbows, normal supination/pronation of the forearms.  Symmetric  strength bilaterally.  Neuro: normal speech and mental status.  Cranial nerves II through XII intact.  No anosmia. DTR 2+; proprioception intact.  No focal motor/sensory deficits.        Assessment and Plan      Assessment/Plan:   Diagnoses and all orders for this visit:    1. Essential hypertension (Primary)  -     nebivolol (BYSTOLIC) 10 MG tablet; Take 1 tablet by mouth Daily.  Dispense: 90 tablet; Refill: 3    2. Gastroesophageal reflux disease without esophagitis  -     dexlansoprazole (Dexilant) 30 MG capsule; Take 1 capsule by mouth Daily.  Dispense: 90 capsule; Refill: 3    3. Need for pneumococcal vaccine  -     Pneumococcal Conjugate Vaccine 20-Valent (PCV20)      Trial of inc dose nebivolol.  I have asked that she notify the office should her blood pressure not respond to the increased dosing, or if she develops any ill effects to the dose change.  Discussed need for reduction in sodium/salt/caffeine intake; improve sleep habits as able; inc formal CV exercise program with goal of vigorous activity most, if not all, days of the week; goal of 150 min of sustained HR CV exercise total per week.    Pneumococcal vaccine today.    Continue Dexilant.  Anti - reflux measures, trigger foods and drinks to avoid: Fatty foods, alcohol, chocolate, coffee, tea, caffeinated soft drinks (decaffeinated coffee still has some caffeine), peppermint and spearmint, spices and vinegar, citrus fruits and juices, tomatoes and tomato sauces, and smoking. Other antireflux measures include weight reduction if overweight, avoid tight clothing around the abdomen, elevate the head of her bed 6 inches (May use a bed wedge which is placed between the mattress in box [place]) or blocks  under the head of the bed, don't drink or eat for 2 hours before going to bed and avoid lying down immediately after meals.        Discussion Summary:    Discussed plan of care in detail with pt today; pt verb understanding and agrees.    I spent 30 minutes caring for Vielka on this date of service. This time includes time spent by me in the following activities:preparing for the visit, performing a medically appropriate examination and/or evaluation , counseling and educating the patient/family/caregiver, ordering medications, tests, or procedures, documenting information in the medical record, and care coordination    I confirm accuracy of unchanged data/findings which have been carried forward from previous visit, as well as I have updated appropriately those that have changed.    Follow up:  Return in about 4 months (around 4/13/2025) for Recheck, Next scheduled follow up.     There are no Patient Instructions on file for this visit.        Patricio Bass DO  01/07/25  17:53 EST

## 2025-01-16 ENCOUNTER — OFFICE VISIT (OUTPATIENT)
Dept: ORTHOPEDIC SURGERY | Facility: CLINIC | Age: 45
End: 2025-01-16
Payer: COMMERCIAL

## 2025-01-16 ENCOUNTER — LAB (OUTPATIENT)
Dept: LAB | Facility: HOSPITAL | Age: 45
End: 2025-01-16
Payer: COMMERCIAL

## 2025-01-16 VITALS
WEIGHT: 193.2 LBS | HEIGHT: 66 IN | BODY MASS INDEX: 31.05 KG/M2 | SYSTOLIC BLOOD PRESSURE: 152 MMHG | DIASTOLIC BLOOD PRESSURE: 90 MMHG

## 2025-01-16 DIAGNOSIS — M79.643 INTERMITTENT PAIN AND SWELLING OF HAND: ICD-10-CM

## 2025-01-16 DIAGNOSIS — M79.89 INTERMITTENT PAIN AND SWELLING OF HAND: ICD-10-CM

## 2025-01-16 DIAGNOSIS — M77.8 RIGHT WRIST TENDINITIS: ICD-10-CM

## 2025-01-16 DIAGNOSIS — M25.541 ARTHRALGIA OF RIGHT HAND: Primary | ICD-10-CM

## 2025-01-16 LAB
BASOPHILS # BLD AUTO: 0.05 10*3/MM3 (ref 0–0.2)
BASOPHILS NFR BLD AUTO: 0.6 % (ref 0–1.5)
CHROMATIN AB SERPL-ACNC: <10 IU/ML (ref 0–14)
CRP SERPL-MCNC: 0.55 MG/DL (ref 0–0.5)
DEPRECATED RDW RBC AUTO: 41.1 FL (ref 37–54)
EOSINOPHIL # BLD AUTO: 0.01 10*3/MM3 (ref 0–0.4)
EOSINOPHIL NFR BLD AUTO: 0.1 % (ref 0.3–6.2)
ERYTHROCYTE [DISTWIDTH] IN BLOOD BY AUTOMATED COUNT: 12.8 % (ref 12.3–15.4)
ERYTHROCYTE [SEDIMENTATION RATE] IN BLOOD: 6 MM/HR (ref 0–20)
HCT VFR BLD AUTO: 44.2 % (ref 34–46.6)
HGB BLD-MCNC: 15.1 G/DL (ref 12–15.9)
IMM GRANULOCYTES # BLD AUTO: 0.03 10*3/MM3 (ref 0–0.05)
IMM GRANULOCYTES NFR BLD AUTO: 0.4 % (ref 0–0.5)
LYMPHOCYTES # BLD AUTO: 2.23 10*3/MM3 (ref 0.7–3.1)
LYMPHOCYTES NFR BLD AUTO: 26.9 % (ref 19.6–45.3)
MCH RBC QN AUTO: 30.1 PG (ref 26.6–33)
MCHC RBC AUTO-ENTMCNC: 34.2 G/DL (ref 31.5–35.7)
MCV RBC AUTO: 88.2 FL (ref 79–97)
MONOCYTES # BLD AUTO: 0.63 10*3/MM3 (ref 0.1–0.9)
MONOCYTES NFR BLD AUTO: 7.6 % (ref 5–12)
NEUTROPHILS NFR BLD AUTO: 5.34 10*3/MM3 (ref 1.7–7)
NEUTROPHILS NFR BLD AUTO: 64.4 % (ref 42.7–76)
NRBC BLD AUTO-RTO: 0 /100 WBC (ref 0–0.2)
PLATELET # BLD AUTO: 310 10*3/MM3 (ref 140–450)
PMV BLD AUTO: 10 FL (ref 6–12)
RBC # BLD AUTO: 5.01 10*6/MM3 (ref 3.77–5.28)
URATE SERPL-MCNC: 3.9 MG/DL (ref 2.4–5.7)
WBC NRBC COR # BLD AUTO: 8.29 10*3/MM3 (ref 3.4–10.8)

## 2025-01-16 PROCEDURE — 99214 OFFICE O/P EST MOD 30 MIN: CPT | Performed by: PHYSICIAN ASSISTANT

## 2025-01-16 PROCEDURE — 86140 C-REACTIVE PROTEIN: CPT | Performed by: PHYSICIAN ASSISTANT

## 2025-01-16 PROCEDURE — 86200 CCP ANTIBODY: CPT | Performed by: PHYSICIAN ASSISTANT

## 2025-01-16 PROCEDURE — 81374 HLA I TYPING 1 ANTIGEN LR: CPT | Performed by: PHYSICIAN ASSISTANT

## 2025-01-16 PROCEDURE — 85652 RBC SED RATE AUTOMATED: CPT | Performed by: PHYSICIAN ASSISTANT

## 2025-01-16 PROCEDURE — 86431 RHEUMATOID FACTOR QUANT: CPT | Performed by: PHYSICIAN ASSISTANT

## 2025-01-16 PROCEDURE — 36415 COLL VENOUS BLD VENIPUNCTURE: CPT | Performed by: PHYSICIAN ASSISTANT

## 2025-01-16 PROCEDURE — 84550 ASSAY OF BLOOD/URIC ACID: CPT | Performed by: PHYSICIAN ASSISTANT

## 2025-01-16 PROCEDURE — 85025 COMPLETE CBC W/AUTO DIFF WBC: CPT | Performed by: PHYSICIAN ASSISTANT

## 2025-01-16 RX ORDER — MELOXICAM 15 MG/1
15 TABLET ORAL DAILY
Qty: 30 TABLET | Refills: 1 | Status: SHIPPED | OUTPATIENT
Start: 2025-01-16

## 2025-01-16 NOTE — PROGRESS NOTES
"Subjective   Patient ID: Vielka Jennings is a 44 y.o. right hand dominant female is being seen for orthopaedic evaluation today for right hand pain and swelling  Pain of the Right Hand (Reports pain and swelling in right hand since christmas, no known injury. )         Pain    Patient presents for evaluation intermittent right hand swelling and pain.  Clenching the right wrist to make a fist often feels extremely tight.  Symptoms have been ongoing since December 2024. NO numbness or tingling to RUE.   She mentions symptoms are better in the morning.   The pain sometimes radiates up and down the right arm.  Patient had a right hand trigger finger injection July 2024 which did alleviate the trigger finger effect.  She denies any recent insect or tick bite.  Unsure of familial history with RA or autoimmune disorders.                                                 Past Medical History:   Diagnosis Date    Abnormal Pap smear of cervix     Acid reflux     Body piercing     EARS    Bronchitis 10/03/2022    has residual cough and nasal congestion (assessed 11/3/22)    Diverticulitis     Heart murmur     REPORTS \"MURMUR AS A BABY\"    IBS (irritable bowel syndrome)     REPORTS PAST HISTORY, NO CURRENT ISSUES    Irritable bowel syndrome with diarrhea 06/03/2016    Port-wine stain     Seasonal allergies         Past Surgical History:   Procedure Laterality Date    ANKLE SURGERY Left     REPORTS LIGAMENT REPAIR, THEN LATER HAD STITCHES REMOVED (1 YEAR POST OP)    CERVICAL BIOPSY  W/ LOOP ELECTRODE EXCISION      CHOLECYSTECTOMY      COLONOSCOPY N/A 11/04/2022    Procedure: COLONOSCOPY WITH POLYPECTOMY X3;  Surgeon: Tico Manzano MD;  Location: [facility] ENDOSCOPY;  Service: Gastroenterology;  Laterality: N/A;    D & C HYSTEROSCOPY N/A 04/03/2023    Procedure: DILATATION AND CURETTAGE HYSTEROSCOPY;  Surgeon: Candy Knight MD;  Location: [facility] OR;  Service: Obstetrics/Gynecology;  Laterality: N/A;    DILATATION AND " "CURETTAGE      ENDOSCOPY      FOOT SURGERY      surgery to repair torn ligaments in left ankle in 2010/2011    HYSTEROSCOPY ENDOMETRIAL ABLATION      LEEP N/A 08/24/2018    Procedure: LOOP ELECTROCAUTERY EXCISION PROCEDURE;  Surgeon: Candy Knight MD;  Location: [facility];  Service: Obstetrics/Gynecology    OTHER SURGICAL HISTORY      AFTER DELIVERY OF DAUGHTER REPORTS SURGICAL INTERVENTION FROM \"TEAR THAT WAS INTERNAL\"    SKIN BIOPSY      benign       Family History   Problem Relation Age of Onset    Cancer Mother         Skin Cancer    Hyperlipidemia Mother     Melanoma Mother     Hyperlipidemia Father     Hypertension Father     Hyperlipidemia Brother     Hypertension Brother     Hyperlipidemia Maternal Grandmother     Heart attack Paternal Grandmother     Hyperlipidemia Paternal Grandmother     Thyroid disease Maternal Aunt     Hyperlipidemia Maternal Grandfather     Heart attack Paternal Grandfather     Hyperlipidemia Paternal Grandfather     Breast cancer Neg Hx         Social History     Socioeconomic History    Marital status:    Tobacco Use    Smoking status: Every Day     Current packs/day: 0.50     Average packs/day: 0.5 packs/day for 25.1 years (12.6 ttl pk-yrs)     Types: Cigarettes     Start date: 1/1/2000     Passive exposure: Current    Smokeless tobacco: Never   Vaping Use    Vaping status: Never Used   Substance and Sexual Activity    Alcohol use: No    Drug use: No    Sexual activity: Not Currently     Partners: Male     Birth control/protection: Birth control pill       Allergies   Allergen Reactions    Gadolinium Derivatives (Mr Contrast) Itching     Immediately upon receiving 17ml of multihance, patient experienced itching of the throat.  No other symptoms were present.    Levaquin [Levofloxacin] Hives    Sulfa Antibiotics Hives    Zithromax [Azithromycin] Diarrhea       Review of Systems   Musculoskeletal:  Positive for arthralgias (right hand). Joint swelling: right " "hand.      Objective   /90   Ht 167.6 cm (66\")   Wt 87.6 kg (193 lb 3.2 oz)   BMI 31.18 kg/m²   Physical Exam  Vitals and nursing note reviewed.   Constitutional:       Appearance: Normal appearance.   Musculoskeletal:      Right shoulder: No deformity. Normal range of motion.      Right elbow: No swelling or deformity. Normal range of motion.      Right forearm: Swelling (mild along the dorsal surface) present.      Right wrist: Swelling (mild) present. No deformity, effusion, snuff box tenderness or crepitus. Normal pulse.      Right hand: No deformity. Normal sensation. There is no disruption of two-point discrimination. Normal capillary refill. Normal pulse.      Comments: Neg. Tinel to right hand     Neurological:      Mental Status: She is alert and oriented to person, place, and time.       Ortho Exam    Neurological Exam  Mental Status  Alert. Oriented to person, place, and time.           Assessment & Plan   Independent Review of Radiographic Studies:    X-ray of the right hand 3 view performed in the clinic independently reviewed for the evaluation of intermittent pain and swelling.  Comparison films are available and reviewed.  No acute fracture or dislocation.  There remains an unchanged fifth metacarpal intramedullary lesion along the mid distal aspect of the metacarpal.  This appears consistent with her known enchondroma.      Procedures      Diagnoses and all orders for this visit:    1. Arthralgia of right hand (Primary)  -     XR Hand 3+ View Right; Future  -     meloxicam (MOBIC) 15 MG tablet; Take 1 tablet by mouth Daily.  Dispense: 30 tablet; Refill: 1  -     C-reactive Protein  -     Rheumatoid Factor  -     Sedimentation Rate  -     Uric Acid  -     Cyclic Citrul Peptide Antibody, IgG / IgA  -     HLA-B27 Antigen  -     CBC & Differential  -     Ambulatory Referral to Physical Therapy for Evaluation & Treatment    2. Intermittent pain and swelling of hand  -     meloxicam (MOBIC) 15 MG " tablet; Take 1 tablet by mouth Daily.  Dispense: 30 tablet; Refill: 1  -     C-reactive Protein  -     Rheumatoid Factor  -     Sedimentation Rate  -     Uric Acid  -     Cyclic Citrul Peptide Antibody, IgG / IgA  -     HLA-B27 Antigen  -     CBC & Differential  -     Ambulatory Referral to Physical Therapy for Evaluation & Treatment    3. Right wrist tendinitis  -     meloxicam (MOBIC) 15 MG tablet; Take 1 tablet by mouth Daily.  Dispense: 30 tablet; Refill: 1  -     Ambulatory Referral to Physical Therapy for Evaluation & Treatment       Discussion of orthopedic goals  Orthopedic activities reviewed and patient expressed appreciation  Risk, benefits, and merits of treatment alternatives reviewed with the patient and questions answered  Ice, heat, and/or modalities as beneficial  Physical therapy referral given  Use brace as instructed    Recommendations/Plan:  Exercise, medications, injections, other patient advice, and return appointment as noted.  Patient is encouraged to call or return for any issues or concerns.    I would like to start the patient on meloxicam once daily.  Avoid other NSAIDs while taking this medicine.  I would also like to order lab work to rule out autoimmune specifically RA pathology.  We will enroll her in formal physical therapy for the presumed wrist extensor tendinopathy and hand swelling.  Patient verbalized her understanding with the plan of care.  If no improvement with the above measures, I would like to refer her to a hand subspecialist.    Patient agreeable to call or return sooner for any concerns.

## 2025-01-17 LAB — CCP IGA+IGG SERPL IA-ACNC: 3 UNITS (ref 0–19)

## 2025-01-21 ENCOUNTER — TELEPHONE (OUTPATIENT)
Dept: ORTHOPEDIC SURGERY | Facility: CLINIC | Age: 45
End: 2025-01-21
Payer: COMMERCIAL

## 2025-01-21 DIAGNOSIS — D16.11 ENCHONDROMA OF BONE OF HAND, RIGHT: ICD-10-CM

## 2025-01-21 DIAGNOSIS — M65.331 TRIGGER MIDDLE FINGER OF RIGHT HAND: ICD-10-CM

## 2025-01-21 DIAGNOSIS — M25.541 ARTHRALGIA OF RIGHT HAND: ICD-10-CM

## 2025-01-21 DIAGNOSIS — M79.643 INTERMITTENT PAIN AND SWELLING OF HAND: Primary | ICD-10-CM

## 2025-01-21 DIAGNOSIS — M79.89 INTERMITTENT PAIN AND SWELLING OF HAND: Primary | ICD-10-CM

## 2025-01-21 DIAGNOSIS — M77.8 RIGHT WRIST TENDINITIS: ICD-10-CM

## 2025-01-21 LAB — HLA-B27 QL NAA+PROBE: NEGATIVE

## 2025-01-21 NOTE — TELEPHONE ENCOUNTER
I called to touch base with the patient regarding her recent lab test that were negative for autoimmune arthropathy.  She had mention to the [facility] earlier via telephone that she was somewhat hesitant about attending physical therapy without having an actual cause or known diagnosis.  I explained to the patient would be happy to refer her to a hand subspecialist for additional evaluation/second opinion.  I feel comfortable the MRI finding was negative for acute pathology.  The lesion appeared benign and I do not suspect the lesion is causing her symptoms.  Patient would like for me to place the referral to hand subspecialty.  She states she has an appointment for physical therapy within the next week and she would keep that appointment to enroll in physical therapy.

## 2025-01-21 NOTE — TELEPHONE ENCOUNTER
Patient called the office stating that she had a missed call from our office. She said it may be in regards to test results. I informed patient that I would send a message to see whom may have tried to contact her.

## 2025-01-22 NOTE — TELEPHONE ENCOUNTER
Returned call to patient regarding lab results. Patient asked what was next in her treatment and would like to speak to the provider.  Sent message to provider to contact patient. Patient informed and stated understanding.

## 2025-01-24 ENCOUNTER — OFFICE VISIT (OUTPATIENT)
Age: 45
End: 2025-01-24
Payer: COMMERCIAL

## 2025-01-24 VITALS
BODY MASS INDEX: 30.86 KG/M2 | WEIGHT: 192 LBS | DIASTOLIC BLOOD PRESSURE: 70 MMHG | HEIGHT: 66 IN | HEART RATE: 65 BPM | OXYGEN SATURATION: 95 % | SYSTOLIC BLOOD PRESSURE: 110 MMHG

## 2025-01-24 DIAGNOSIS — M25.541 ARTHRALGIA OF RIGHT HAND: ICD-10-CM

## 2025-01-24 DIAGNOSIS — I10 ESSENTIAL HYPERTENSION: Primary | ICD-10-CM

## 2025-01-24 DIAGNOSIS — Z23 NEED FOR TDAP VACCINATION: ICD-10-CM

## 2025-01-24 DIAGNOSIS — K21.9 GASTROESOPHAGEAL REFLUX DISEASE WITHOUT ESOPHAGITIS: ICD-10-CM

## 2025-01-24 RX ORDER — MELOXICAM 15 MG/1
15 TABLET ORAL DAILY
Qty: 90 TABLET | Refills: 3 | Status: SHIPPED | OUTPATIENT
Start: 2025-01-24

## 2025-01-24 RX ORDER — NEBIVOLOL 10 MG/1
10 TABLET ORAL DAILY
Qty: 90 TABLET | Refills: 3 | Status: SHIPPED | OUTPATIENT
Start: 2025-01-24

## 2025-01-24 RX ORDER — DEXLANSOPRAZOLE 30 MG/1
30 CAPSULE, DELAYED RELEASE ORAL DAILY
Qty: 90 CAPSULE | Refills: 3 | Status: SHIPPED | OUTPATIENT
Start: 2025-01-24

## 2025-01-31 ENCOUNTER — OFFICE VISIT (OUTPATIENT)
Age: 45
End: 2025-01-31
Payer: COMMERCIAL

## 2025-01-31 VITALS
DIASTOLIC BLOOD PRESSURE: 100 MMHG | WEIGHT: 195.5 LBS | BODY MASS INDEX: 32.57 KG/M2 | SYSTOLIC BLOOD PRESSURE: 162 MMHG | HEIGHT: 65 IN

## 2025-01-31 DIAGNOSIS — M65.331 TRIGGER MIDDLE FINGER OF RIGHT HAND: ICD-10-CM

## 2025-01-31 DIAGNOSIS — M79.89 INTERMITTENT PAIN AND SWELLING OF HAND: Primary | ICD-10-CM

## 2025-01-31 DIAGNOSIS — M79.643 INTERMITTENT PAIN AND SWELLING OF HAND: Primary | ICD-10-CM

## 2025-01-31 DIAGNOSIS — G56.01 CARPAL TUNNEL SYNDROME OF RIGHT WRIST: ICD-10-CM

## 2025-01-31 RX ORDER — LIDOCAINE HYDROCHLORIDE 10 MG/ML
0.5 INJECTION, SOLUTION EPIDURAL; INFILTRATION; INTRACAUDAL; PERINEURAL
Status: COMPLETED | OUTPATIENT
Start: 2025-01-31 | End: 2025-01-31

## 2025-01-31 RX ORDER — TRIAMCINOLONE ACETONIDE 40 MG/ML
20 INJECTION, SUSPENSION INTRA-ARTICULAR; INTRAMUSCULAR
Status: COMPLETED | OUTPATIENT
Start: 2025-01-31 | End: 2025-01-31

## 2025-01-31 RX ADMIN — LIDOCAINE HYDROCHLORIDE 0.5 ML: 10 INJECTION, SOLUTION EPIDURAL; INFILTRATION; INTRACAUDAL; PERINEURAL at 10:03

## 2025-01-31 RX ADMIN — TRIAMCINOLONE ACETONIDE 20 MG: 40 INJECTION, SUSPENSION INTRA-ARTICULAR; INTRAMUSCULAR at 10:03

## 2025-01-31 NOTE — PROGRESS NOTES
Procedure   - Hand/Upper Extremity Injection: R long A1 for trigger finger on 1/31/2025 10:03 AM  Indications: pain  Details: 27 G (short) needle, volar approach  Medications: 0.5 mL lidocaine PF 1% 1 %; 20 mg triamcinolone acetonide 40 MG/ML  Outcome: tolerated well, no immediate complications  Procedure, treatment alternatives, risks and benefits explained, specific risks discussed. Consent was given by the patient. Immediately prior to procedure a time out was called to verify the correct patient, procedure, equipment, support staff and site/side marked as required. Patient was prepped and draped in the usual sterile fashion.

## 2025-01-31 NOTE — PROGRESS NOTES
[facility] Orthopedic     Office Visit       Date: 01/31/2025   Patient Name: Vielka Jennings  MRN: [id number]  YOB: 1980    Referring Physician: Estrada Chatterjee, *     Chief Complaint:   Chief Complaint   Patient presents with   • Right Wrist - Pain   • Right Hand - Pain     History of Present Illness:   Vielka Jennings is a 44 y.o. female right-hand-dominant presented clinic as a new patient with complaints of right long finger catching and locking as well as diffuse right hand pain and swelling.  She reports swelling to her hand digits and forearm for the past 6 weeks.  This began without injury or trauma.  She also reports a known long trigger finger and underwent injection in July having several months of relief.  She denies any specific numbness or tingling to the hands.  She reports awakening at night due to her trigger finger but is unsure if she has numbness.  No other complaints or concerns.    When asked if the patient has a history of DVT, she reported yes.  She states after the IV contrast from her MRI, she had a blood clot that was treated with a IM shot by her PCP.  She was not on prolonged anticoagulation.    Additionally, when asked about autoimmune disorders she reports a family history of rheumatoid arthritis.  She has recent underwent laboratory studies that were reviewed and TUNG/anti-CCP were negative.  Mildly elevated CRP.    Subjective   Review of Systems:   Review of Systems   Constitutional:  Negative for chills, fever, unexpected weight gain and unexpected weight loss.   HENT:  Negative for congestion, postnasal drip and rhinorrhea.    Eyes:  Negative for blurred vision.   Respiratory:  Negative for shortness of breath.    Cardiovascular:  Negative for leg swelling.   Gastrointestinal:  Negative for abdominal pain, nausea and vomiting.   Genitourinary:  Negative for difficulty urinating.  "  Musculoskeletal:  Positive for arthralgias. Negative for gait problem, joint swelling and myalgias.   Skin:  Negative for skin lesions and wound.   Neurological:  Negative for dizziness, weakness, light-headedness and numbness.   Hematological:  Does not bruise/bleed easily.   Psychiatric/Behavioral:  Negative for depressed mood.       Pertinent review of systems per HPI    I reviewed the patient's chief complaint, history of present illness, review of systems, past medical history, surgical history, family history, social history, medications and allergy list in the EMR on 01/31/2025 and agree with the findings above.    Objective    Vital Signs:   Vitals:    01/31/25 0932   BP: 162/100   Weight: 88.7 kg (195 lb 8 oz)   Height: 165.1 cm (65\")     General: No acute distress. Alert and oriented.   Cardiovascular: Palpable radial pulse.   Respiratory: Breathing is nonlabored.   Ortho Exam:  Examination of the right upper extremity demonstrates mild swelling along the dorsum of the hand.  No skin lesions or abrasions.  No significant swelling noted proximal to the wrist forearm or elbow as compared to the contralateral side.  She is tender over the long finger A1 pulley with catching and locking during examination.  Negative Tinel, weakly positive Durkan's and Phalen's.  Nontender over the pronator with negative Tinel's.  Tinel's along the cubital tunnel.  Sensations intact light touch throughout the hand.  5/5 APB and FDI strength.  Ousmane's test demonstrates appropriate flow through the ulnar and radial arteries.  Warm and well-perfused distally.    Imaging / Studies:    Imaging Results (Last 24 Hours)       ** No results found for the last 24 hours. **        Right hand x-rays obtained on 1/16/2025 were personally reviewed and interpreted by myself.  There is a lucent metaphyseal lesion noted in the fifth metacarpal.  Evidence of cortical expansion with thinning.  No significant change from prior imaging.    Right " hand x-rays obtained on 6/27/2024 were personally reviewed interpreted by myself. There is a lucent metaphyseal lesion noted in the fifth metacarpal.  Evidence of cortical expansion with thinning.     Right hand MRI obtained on 7/17/2024 was personally reviewed and interpreted by myself.  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  Cortical thinning is noted with no associated soft tissue component or erosion of the cortex.    Assessment / Plan    Assessment/Plan:   Vielka Jennings is a 44 y.o. female with a right long trigger finger and right carpal tunnel syndrome.    I discussed with the patient their clinical findings demonstrate a trigger finger.  We had a lengthy discussion regarding the pathophysiology of inflammation of the tendon-sheath unit, using the analogy of a subway tunnel.  Both conservative and surgical options were discussed.  Conservative treatments in the form of: observation, gentle stretching exercises, nighttime coban wrapping, and injection were presented. Discussed that approximately 70% of patients have complete symptoms resolution after 1 steroid injection, and should they fail 1 injection, they may still be considered for a second steroid injection.  Also discussed that the patient may not see any improvement from the steroid injection for sometimes 2 weeks. Operative treatments in the form of A1 pulley release was also discussed.  After expressing understanding of all options, the patient elects to proceed with repeat corticosteroid injection today and nighttime Coban wrapping    Additionally,I discussed with the patient their clinical electrodiagnostic findings demonstrate carpal tunnel syndrome.  The pathophysiology of the condition, including compression of the median nerve in the carpal tunnel, was explained in detail.  It was also discussed that with more severe symptomatology, such as persistent and worsening  paresthesias, that permanent nerve damage may result, which may make improvement after surgery less predictable.  Both conservative and surgical options were discussed.  Conservative treatments in the form of: observation, gentle nerve gliding exercises, night time splinting, and injection were presented.  Operative treatment in the form of open carpal tunnel release was presented and reiterated that the goal of surgery is to prevent further compression and damage to the nerve. Further workup with electrodiagnostic studies was also discussed and recommended.  Her clinical history and exam are benign and inconclusive.  Although she does not have deepa numbness and tingling in the median nerve distribution, her diffuse hand pain and swelling can be seen in carpal tunnel syndrome.  I am not suspicious for an underlying blood clot and therefore no additional vascular studies will be ordered.  I would like to rule this out as a potential pathology with a nerve study.  She is also provided a wrist brace to be worn at night as well as nerve gliding exercises.  She will hold off on formal hand therapy until reevaluation.  I will see her back with EMG results.      ICD-10-CM ICD-9-CM   1. Intermittent pain and swelling of hand  M79.643 729.5    M79.89 729.81   2. Trigger middle finger of right hand  M65.331 727.03   3. Carpal tunnel syndrome of right wrist  G56.01 354.0     Follow Up:   Return for Follow Up- After Testing.      Adela Andrews MD  [facility] [place] [facility]

## 2025-02-07 PROBLEM — M25.541 ARTHRALGIA OF RIGHT HAND: Status: ACTIVE | Noted: 2025-02-07

## 2025-02-07 NOTE — PROGRESS NOTES
Established Patient        Chief Complaint:   Chief Complaint   Patient presents with    Hypertension        Vielka Jennings is a 44 y.o. female    History of Present Illness:   Answers submitted by the patient for this visit:  Problem not listed (Submitted on 1/24/2025)  Chief Complaint: Other medical problem  abdominal pain: No  anorexia: No  joint pain: Yes  change in stool: No  chest pain: Yes  chills: No  nasal congestion: No  cough: No  diaphoresis: No  fatigue: Yes  fever: No  headaches: No  joint swelling: Yes  myalgias: No  nausea: No  neck pain: No  numbness: No  rash: No  sore throat: No  swollen glands: No  dysuria: No  vertigo: No  visual change: No  vomiting: No  weakness: No  Onset: 1 to 6 months    Here today in scheduled follow-up visit of her hypertension, GERD and right hand arthralgias.      Denies chest pain, syncope, palpitations or vertigo.  Denies fever, chills or night sweats.  Maintains an active lifestyle, balanced dietary intake; reports good hydration habits.  Denies hematuria/dysuria.  Denies any BRB/BTS.  No new rashes.  Denies orthopnea, epistaxis or hemoptysis.    Patient reports good response to meloxicam and treatment of her right hand arthralgias.      Subjective     The following portions of the patient's history were reviewed and updated as appropriate: allergies, current medications, past family history, past medical history, past social history, past surgical history and problem list.    Allergies   Allergen Reactions    Gadolinium Derivatives (Mr Contrast) Itching     Immediately upon receiving 17ml of multihance, patient experienced itching of the throat.  No other symptoms were present.    Levaquin [Levofloxacin] Hives    Sulfa Antibiotics Hives    Zithromax [Azithromycin] Diarrhea       Review of Systems:    Constitutional: Negative for fever. Negative for chills, diaphoresis, fatigue and unexpected weight change.   HENT: No dysphagia; no  "changes to vision/hearing/smell/taste; no epistaxis.  Otherwise, as per above.  Eyes: Negative for redness and visual disturbance.   Respiratory: negative for shortness of breath. Negative for chest pain . Negative for cough and chest tightness.   Cardiovascular: Negative for chest pain and palpitations.   Gastrointestinal: Negative for abdominal distention, abdominal pain and blood in stool.   Endocrine: Negative for cold intolerance and heat intolerance.   Genitourinary: Negative for difficulty urinating, dysuria and frequency.   Musculoskeletal: As per above to left upper extremity.  Skin: No new rashes or lesions.  Neurological: Negative for syncope, weakness and headaches.   Hematological: Negative for adenopathy. Does not bruise/bleed easily.   Psychiatric/Behavioral: Negative for confusion. The patient is not nervous/anxious.    Objective     Physical Exam   Vital Signs: /70   Pulse 65   Ht 167.6 cm (66\")   Wt 87.1 kg (192 lb)   SpO2 95%   BMI 30.99 kg/m²   BMI is >= 25 and <30. (Overweight) The following options were offered after discussion;: exercise counseling/recommendations and nutrition counseling/recommendations  Patient's (Body mass index is 30.99 kg/m².) indicates that they are overweight (BMI 25-29.9) with health related conditions that include GERD . Weight is unchanged. BMI is is above average; BMI management plan is completed. We discussed portion control and increasing exercise.      General Appearance: alert, oriented x 3, no acute distress.  Skin: warm and dry.  Known port wine stain to the right face area.  Tug test demonstrates easy hair shedding.  No areas of alopecia areata or totalis.  HEENT: Atraumatic.  pupils round and reactive to light and accommodation, oral mucosa pink and moist.  Nares patent without epistaxis.  External auditory canals are patent tympanic membranes intact.  Serous effusion noted bilateral tympanic membranes, slight erythema.  Decreased mobility on " Valsalva/insufflation.  Neck: supple, no JVD, trachea midline.  No thyromegaly  Lungs: CTA, unlabored breathing effort.  Heart: RRR, normal S1 and S2, no S3, no rub.  Abdomen: soft, non-tender, no palpable bladder, present bowel sounds to auscultation ×4.  No guarding or rigidity.  No CVA tenderness.  Extremities: no clubbing, cyanosis or edema.  Good range of motion actively and passively.  Symmetric muscle strength and development.  Nodular area to the flexor tendon of right hand, third phalanx, just proximal to the A1 avery.  Tender to palpation, trigger phenomenon obvious.  Mild prominence to the cephalic vein of the left upper extremity, particularly upper arm.  No appreciable edematous changes.  Normal extension/flexion at the elbows, normal supination/pronation of the forearms.  Symmetric  strength bilaterally.  Neuro: normal speech and mental status.  Cranial nerves II through XII intact.  No anosmia. DTR 2+; proprioception intact.  No focal motor/sensory deficits.        Assessment and Plan      Assessment/Plan:   Diagnoses and all orders for this visit:    1. Essential hypertension (Primary)  -     nebivolol (BYSTOLIC) 10 MG tablet; Take 1 tablet by mouth Daily.  Dispense: 90 tablet; Refill: 3    2. Gastroesophageal reflux disease without esophagitis  -     dexlansoprazole (Dexilant) 30 MG capsule; Take 1 capsule by mouth Daily.  Dispense: 90 capsule; Refill: 3    3. Need for Tdap vaccination  -     Tdap Vaccine => 8yo IM (BOOSTRIX/ADACEL)    4. Arthralgia of right hand  -     meloxicam (MOBIC) 15 MG tablet; Take 1 tablet by mouth Daily.  Dispense: 90 tablet; Refill: 3      Vital signs demonstrate hemodynamic stability, blood pressure is at goal.  Continue current dose of nebivolol.    Refill provided for meloxicam, continues to realize therapeutic benefit.    Continue PPI therapy.    Updated Tdap vaccine today.    Discussion Summary:    Discussed plan of care in detail with pt today; pt verb  understanding and agrees.    I spent 30 minutes caring for Vielka on this date of service. This time includes time spent by me in the following activities:preparing for the visit, performing a medically appropriate examination and/or evaluation , counseling and educating the patient/family/caregiver, ordering medications, tests, or procedures, documenting information in the medical record, and care coordination    I confirm accuracy of unchanged data/findings which have been carried forward from previous visit, as well as I have updated appropriately those that have changed.      Follow up:  Return in about 6 months (around 7/24/2025) for Recheck.     There are no Patient Instructions on file for this visit.        Patricio Bass DO  02/07/25  14:33 EST

## 2025-02-13 ENCOUNTER — OFFICE VISIT (OUTPATIENT)
Dept: ORTHOPEDIC SURGERY | Facility: CLINIC | Age: 45
End: 2025-02-13
Payer: COMMERCIAL

## 2025-02-13 VITALS
DIASTOLIC BLOOD PRESSURE: 72 MMHG | WEIGHT: 195 LBS | BODY MASS INDEX: 32.49 KG/M2 | HEIGHT: 65 IN | SYSTOLIC BLOOD PRESSURE: 134 MMHG

## 2025-02-13 DIAGNOSIS — G56.01 CARPAL TUNNEL SYNDROME OF RIGHT WRIST: Primary | ICD-10-CM

## 2025-02-13 RX ORDER — TRIAMCINOLONE ACETONIDE 40 MG/ML
20 INJECTION, SUSPENSION INTRA-ARTICULAR; INTRAMUSCULAR
Status: COMPLETED | OUTPATIENT
Start: 2025-02-13 | End: 2025-02-13

## 2025-02-13 RX ORDER — LIDOCAINE HYDROCHLORIDE 10 MG/ML
0.5 INJECTION, SOLUTION EPIDURAL; INFILTRATION; INTRACAUDAL; PERINEURAL
Status: COMPLETED | OUTPATIENT
Start: 2025-02-13 | End: 2025-02-13

## 2025-02-13 RX ADMIN — LIDOCAINE HYDROCHLORIDE 0.5 ML: 10 INJECTION, SOLUTION EPIDURAL; INFILTRATION; INTRACAUDAL; PERINEURAL at 10:12

## 2025-02-13 RX ADMIN — TRIAMCINOLONE ACETONIDE 20 MG: 40 INJECTION, SUSPENSION INTRA-ARTICULAR; INTRAMUSCULAR at 10:12

## 2025-02-13 NOTE — PROGRESS NOTES
[facility] Orthopedic     Office Visit       Date: 02/13/2025   Patient Name: Vielka Jennings  MRN: [id number]  YOB: 1980    Referring Physician: No ref. provider found     Chief Complaint:   Chief Complaint   Patient presents with    Follow-up     2 week follow up -Intermittent pain and swelling of hand -EMG preformed 2/10/25       History of Present Illness:   Vielka Jennings is a 44 y.o. female right-hand-dominant presented clinic for follow-up of EMG results.  The patient has a known history of a right long trigger finger and complains mostly of swelling in the hand digits and forearm.  She reports that there has been relatively no change in the numbness and tingling to her hands.  She feels this affects the thumb index and long fingers.  She has had no locking of the long finger recently.  She has been wearing her brace at night and performing nerve gliding exercises with minimal improvements.     When asked if the patient has a history of DVT, she reported yes.  She states after the IV contrast from her MRI, she had a blood clot that was treated with a IM shot by her PCP.  She was not on prolonged anticoagulation.     Additionally, when asked about autoimmune disorders she reports a family history of rheumatoid arthritis.  She has recent underwent laboratory studies that were reviewed and TUNG/anti-CCP were negative.  Mildly elevated CRP    Subjective   Review of Systems:   Review of Systems   Constitutional:  Negative for chills, fever, unexpected weight gain and unexpected weight loss.   HENT:  Negative for congestion, postnasal drip and rhinorrhea.    Eyes:  Negative for blurred vision.   Respiratory:  Negative for shortness of breath.    Cardiovascular:  Negative for leg swelling.   Gastrointestinal:  Negative for abdominal pain, nausea and vomiting.   Genitourinary:  Negative for difficulty urinating.  "  Musculoskeletal:  Positive for arthralgias. Negative for gait problem, joint swelling and myalgias.   Skin:  Negative for skin lesions and wound.   Neurological:  Negative for dizziness, weakness, light-headedness and numbness.   Hematological:  Does not bruise/bleed easily.   Psychiatric/Behavioral:  Negative for depressed mood.       Pertinent review of systems per HPI    I reviewed the patient's chief complaint, history of present illness, review of systems, past medical history, surgical history, family history, social history, medications and allergy list in the EMR on 02/13/2025 and agree with the findings above.    Objective    Vital Signs:   Vitals:    02/13/25 0952   BP: 134/72   Weight: 88.5 kg (195 lb)   Height: 165.1 cm (65\")     General: No acute distress. Alert and oriented.   Cardiovascular: Palpable radial pulse.   Respiratory: Breathing is nonlabored.   Ortho Exam:  Examination right upper extremity demonstrates no deformity.  No skin lesions or abrasions.  No significant swelling.  She is nontender over the long finger A1 pulley today.  Positive Tinel, Durkan's, Phalen's at the wrist.  Nontender proximally over the pronator with negative Tinel's.  Negative Tinel's along the cubital tunnel.  Negative elbow flexion compression test.  Sensation is intact light touch throughout the hand.  5/5 APB and FDI strength.  Warm and well-perfused distally.    Imaging / Studies:    Imaging Results (Last 24 Hours)       ** No results found for the last 24 hours. **        Right hand x-rays obtained on 1/16/2025 were personally reviewed and interpreted by myself.  There is a lucent metaphyseal lesion noted in the fifth metacarpal.  Evidence of cortical expansion with thinning.  No significant change from prior imaging.     Right hand x-rays obtained on 6/27/2024 were personally reviewed interpreted by myself. There is a lucent metaphyseal lesion noted in the fifth metacarpal.  Evidence of cortical expansion with " thinning.      Right hand MRI obtained on 7/17/2024 was personally reviewed and interpreted by myself.  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  Cortical thinning is noted with no associated soft tissue component or erosion of the cortex.    EMG nerve conduction study performed on 2/10/2025 demonstrates very mild right carpal tunnel syndrome.    Procedure Note:  After reviewing the risks, benefits and alternatives to a steroid injection, which include but are not limited to; hypopigmentation, fat necrosis/atrophy, pain, swelling, bleeding, bruising, damage to nearby nerves/vessels, allergic reaction , transient elevation in blood glucose levels and infection a verbal consent was obtained. A time-out was then performed and the affected hand was prepped with chlorhexadine soap and ethyl chloride was used to numb the skin. The right carpal tunnel was injected with 0.5cc: 0.5cc mixture of Kenalog - 40 mg/ml and Lidocaine - 1% / 2 ml. The injection was well tolerated and a sterile dressing was applied. There were no complications. I advised the patient that they might experience some local discomfort for the next couple days and can apply ice to the site as needed.    Assessment / Plan    Assessment/Plan:   Vielka Jennings is a 44 y.o. female with a right long trigger finger and right carpal tunnel syndrome.     I reviewed the patient her EMG findings demonstrate very mild right carpal tunnel syndrome.  She does appear to be symptomatic at the carpal tunnel and asymptomatic at the right long trigger finger today.  I offered her a therapeutic and diagnostic corticosteroid injection into the carpal tunnel.  She was agreeable and this was provided today.  She may continue nighttime bracing and gentle nerve gliding exercises.  I will see her back in 3 months for reevaluation.  All questions and concerns were addressed.  She is agreeable.       ICD-10-CM ICD-9-CM   1. Carpal tunnel syndrome of right wrist  G56.01 354.0     Follow Up:   Return in about 3 months (around 5/13/2025) for Follow Up.      Adela Andrews MD  [facility] [place] [facility]

## 2025-02-13 NOTE — PROGRESS NOTES
Procedure   - Hand/Upper Extremity Injection: R carpal tunnel for carpal tunnel syndrome on 2/13/2025 10:12 AM  Indications: pain  Details: 27 G needle, volar approach  Medications: 20 mg triamcinolone acetonide 40 MG/ML; 0.5 mL lidocaine PF 1% 1 %  Outcome: tolerated well, no immediate complications  Procedure, treatment alternatives, risks and benefits explained, specific risks discussed. Consent was given by the patient. Immediately prior to procedure a time out was called to verify the correct patient, procedure, equipment, support staff and site/side marked as required. Patient was prepped and draped in the usual sterile fashion.

## 2025-05-20 ENCOUNTER — OFFICE VISIT (OUTPATIENT)
Age: 45
End: 2025-05-20
Payer: COMMERCIAL

## 2025-05-20 VITALS
DIASTOLIC BLOOD PRESSURE: 82 MMHG | HEIGHT: 65 IN | WEIGHT: 198.8 LBS | BODY MASS INDEX: 33.12 KG/M2 | SYSTOLIC BLOOD PRESSURE: 130 MMHG

## 2025-05-20 DIAGNOSIS — G56.01 CARPAL TUNNEL SYNDROME OF RIGHT WRIST: Primary | ICD-10-CM

## 2025-05-20 RX ORDER — PHENAZOPYRIDINE HYDROCHLORIDE 100 MG/1
100 TABLET, FILM COATED ORAL AS NEEDED
COMMUNITY
Start: 2025-02-27

## 2025-05-20 NOTE — PROGRESS NOTES
[facility]     Office Visit       Date: 05/20/2025   Patient Name: Vielka Jennings  MRN: [id number]  YOB: 1980    Referring Physician: No ref. provider found     Chief Complaint:   Chief Complaint   Patient presents with    Follow-up     3 month recheck - Carpal tunnel syndrome of right wrist      History of Present Illness:   Vielka Jennings is a 45 y.o. female right-hand-dominant presenting to clinic for follow-up of right long trigger finger and right carpal tunnel syndrome.  At the patient's last clinic visit she received a right carpal tunnel corticosteroid injection.  She reports that the carpal tunnel injection did not improve her symptoms.  She had no relief even for a short amount of time.  She continues to endorse pain and swelling throughout the hand forearm and up into her arm.  They are associate numbness and tingling that she feels is in her forearm.  No other complaints.    Subjective   Review of Systems:   Review of Systems   Constitutional: Negative.  Negative for chills, fatigue and fever.   HENT: Negative.  Negative for congestion and dental problem.    Eyes: Negative.  Negative for blurred vision.   Respiratory: Negative.  Negative for shortness of breath.    Cardiovascular: Negative.  Negative for leg swelling.   Gastrointestinal: Negative.  Negative for abdominal pain.   Endocrine: Negative.  Negative for polyuria.   Genitourinary: Negative.  Negative for difficulty urinating.   Musculoskeletal:  Positive for arthralgias.   Skin: Negative.    Allergic/Immunologic: Negative.    Neurological: Negative.    Hematological: Negative.  Negative for adenopathy.   Psychiatric/Behavioral: Negative.  Negative for behavioral problems.       Pertinent review of systems per HPI    I reviewed the patient's chief complaint, history of present illness, review of systems, past medical history, surgical  "history, family history, social history, medications and allergy list in the EMR on 05/20/2025 and agree with the findings above.    Objective    Vital Signs:   Vitals:    05/20/25 0814   BP: 130/82   Weight: 90.2 kg (198 lb 12.8 oz)   Height: 165.1 cm (65\")     General: No acute distress. Alert and oriented.   Cardiovascular: Palpable radial pulse.   Respiratory: Breathing is nonlabored.   Ortho Exam:  Examination of the right upper extremity mistreats no deformity.  No atrophy or wasting.  No skin lesions or abrasions.  Negative Tinel, Durkan's, Phalen's at the wrist.  Tender over the pronator proximally.  Sensations gross intact light touch at the median and ulnar nerve distributions of the hand.  She has full symmetric wrist range of motion.  She is able to make composite fist.  5/5 APB and FDI strength.  Nontender at the A1 pulleys.  Palpable radial and ulnar pulses.  Warm and well-perfused distally.    Imaging / Studies:    Imaging Results (Last 24 Hours)       ** No results found for the last 24 hours. **        Right hand x-rays obtained on 1/16/2025 were personally reviewed and interpreted by myself.  There is a lucent metaphyseal lesion noted in the fifth metacarpal.  Evidence of cortical expansion with thinning.  No significant change from prior imaging.     Right hand x-rays obtained on 6/27/2024 were personally reviewed interpreted by myself. There is a lucent metaphyseal lesion noted in the fifth metacarpal.  Evidence of cortical expansion with thinning.      Right hand MRI obtained on 7/17/2024 was personally reviewed and interpreted by myself.  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  Cortical thinning is noted with no associated soft tissue component or erosion of the cortex.     EMG nerve conduction study performed on 2/10/2025 demonstrates very mild right carpal tunnel syndrome.    Assessment / Plan    Assessment/Plan:   Vielka" Mile Jennings is a 45 y.o. female with right carpal tunnel syndrome.    I discussed with the patient that a negative response to the injection indicates a negative prognosis after surgery.  At this point we have obtained advanced imaging with an MRI as well as an EMG and she has not responded to injections, bracing, therapy, and anti-inflammatories.  It is unclear to me the etiology of her symptoms.  My recommendation was for a second opinion or referral to pain management.  The patient states that she does not wish to pursue any additional treatments at this time.  I discussed that she may call the office if wishing to proceed with a referral and this will be provided.  All questions and concerns were addressed.  She agreeable.      ICD-10-CM ICD-9-CM   1. Carpal tunnel syndrome of right wrist  G56.01 354.0     Follow Up:   Return if symptoms worsen or fail to improve.      Adela Andrews MD  [facility]

## 2025-06-30 RX ORDER — ACETAMINOPHEN AND CODEINE PHOSPHATE 120; 12 MG/5ML; MG/5ML
1 SOLUTION ORAL DAILY
Qty: 28 TABLET | Refills: 3 | Status: SHIPPED | OUTPATIENT
Start: 2025-06-30 | End: 2026-06-30

## 2025-07-16 ENCOUNTER — TELEPHONE (OUTPATIENT)
Dept: ORTHOPEDIC SURGERY | Facility: CLINIC | Age: 45
End: 2025-07-16
Payer: COMMERCIAL

## 2025-07-16 NOTE — TELEPHONE ENCOUNTER
Caller: Vielka Jennigns    Relationship to patient: Self    Best call back number: [phone]    Type of visit: R HAND TRIGGER FINGER INJECTION, PT LAST HAD AN INJECTION FROM DR VALENZUELA FOR THIS ISSUE.    Requested date: ASAP     Additional notes: PLEASE CONTACT PT TO DISCUSS IF PT CAN SCHEDULE INJECTION WITH [facility].

## 2025-07-16 NOTE — TELEPHONE ENCOUNTER
Spoke with patient to let her know that once she has been treated by a hand specialist then she would need to follow back up with them.

## 2025-07-23 ENCOUNTER — OFFICE VISIT (OUTPATIENT)
Age: 45
End: 2025-07-23
Payer: COMMERCIAL

## 2025-07-23 ENCOUNTER — APPOINTMENT (OUTPATIENT)
Dept: CT IMAGING | Facility: HOSPITAL | Age: 45
End: 2025-07-23
Payer: COMMERCIAL

## 2025-07-23 ENCOUNTER — HOSPITAL ENCOUNTER (OUTPATIENT)
Facility: HOSPITAL | Age: 45
Setting detail: OBSERVATION
LOS: 1 days | Discharge: HOME | End: 2025-07-25
Attending: STUDENT IN AN ORGANIZED HEALTH CARE EDUCATION/TRAINING PROGRAM | Admitting: STUDENT IN AN ORGANIZED HEALTH CARE EDUCATION/TRAINING PROGRAM
Payer: COMMERCIAL

## 2025-07-23 VITALS
OXYGEN SATURATION: 97 % | HEART RATE: 82 BPM | WEIGHT: 193 LBS | BODY MASS INDEX: 32.15 KG/M2 | TEMPERATURE: 98.6 F | HEIGHT: 65 IN

## 2025-07-23 DIAGNOSIS — K57.92 DIVERTICULITIS: ICD-10-CM

## 2025-07-23 DIAGNOSIS — R31.9 HEMATURIA, UNSPECIFIED TYPE: Primary | ICD-10-CM

## 2025-07-23 DIAGNOSIS — R10.32 LEFT LOWER QUADRANT ABDOMINAL PAIN: ICD-10-CM

## 2025-07-23 DIAGNOSIS — R31.9 HEMATURIA, UNSPECIFIED TYPE: ICD-10-CM

## 2025-07-23 DIAGNOSIS — K57.20 DIVERTICULITIS OF COLON WITH PERFORATION: Primary | ICD-10-CM

## 2025-07-23 LAB
ALBUMIN SERPL-MCNC: 4.3 G/DL (ref 3.5–5.2)
ALBUMIN/GLOB SERPL: 1.4 G/DL
ALP SERPL-CCNC: 109 U/L (ref 39–117)
ALT SERPL W P-5'-P-CCNC: 26 U/L (ref 1–33)
ANION GAP SERPL CALCULATED.3IONS-SCNC: 12.6 MMOL/L (ref 5–15)
AST SERPL-CCNC: 18 U/L (ref 1–32)
B-HCG UR QL: NEGATIVE
BACTERIA UR QL AUTO: NORMAL /HPF
BASOPHILS # BLD AUTO: 0.04 10*3/MM3 (ref 0–0.2)
BASOPHILS NFR BLD AUTO: 0.2 % (ref 0–1.5)
BILIRUB BLD-MCNC: ABNORMAL MG/DL
BILIRUB SERPL-MCNC: 1.2 MG/DL (ref 0–1.2)
BILIRUB UR QL STRIP: NEGATIVE
BUN SERPL-MCNC: 8 MG/DL (ref 6–20)
BUN/CREAT SERPL: 11.8 (ref 7–25)
CALCIUM SPEC-SCNC: 10 MG/DL (ref 8.6–10.5)
CHLORIDE SERPL-SCNC: 97 MMOL/L (ref 98–107)
CLARITY UR: CLEAR
CLARITY, POC: CLEAR
CO2 SERPL-SCNC: 23.4 MMOL/L (ref 22–29)
COLOR UR: ABNORMAL
COLOR UR: ABNORMAL
CREAT SERPL-MCNC: 0.68 MG/DL (ref 0.57–1)
D-LACTATE SERPL-SCNC: 0.9 MMOL/L (ref 0.5–2)
DEPRECATED RDW RBC AUTO: 44.2 FL (ref 37–54)
EGFRCR SERPLBLD CKD-EPI 2021: 110.3 ML/MIN/1.73
EOSINOPHIL # BLD AUTO: 0.01 10*3/MM3 (ref 0–0.4)
EOSINOPHIL NFR BLD AUTO: 0.1 % (ref 0.3–6.2)
ERYTHROCYTE [DISTWIDTH] IN BLOOD BY AUTOMATED COUNT: 13.9 % (ref 12.3–15.4)
EXPIRATION DATE: ABNORMAL
GLOBULIN UR ELPH-MCNC: 3.1 GM/DL
GLUCOSE SERPL-MCNC: 102 MG/DL (ref 65–99)
GLUCOSE UR STRIP-MCNC: NEGATIVE MG/DL
GLUCOSE UR STRIP-MCNC: NEGATIVE MG/DL
HCT VFR BLD AUTO: 45 % (ref 34–46.6)
HGB BLD-MCNC: 15 G/DL (ref 12–15.9)
HGB UR QL STRIP.AUTO: ABNORMAL
HOLD SPECIMEN: NORMAL
HOLD SPECIMEN: NORMAL
HYALINE CASTS UR QL AUTO: NORMAL /LPF
IMM GRANULOCYTES # BLD AUTO: 0.12 10*3/MM3 (ref 0–0.05)
IMM GRANULOCYTES NFR BLD AUTO: 0.7 % (ref 0–0.5)
KETONES UR QL STRIP: ABNORMAL
KETONES UR QL: ABNORMAL
LEUKOCYTE EST, POC: ABNORMAL
LEUKOCYTE ESTERASE UR QL STRIP.AUTO: NEGATIVE
LIPASE SERPL-CCNC: 19 U/L (ref 13–60)
LYMPHOCYTES # BLD AUTO: 1.3 10*3/MM3 (ref 0.7–3.1)
LYMPHOCYTES NFR BLD AUTO: 7.4 % (ref 19.6–45.3)
Lab: ABNORMAL
MCH RBC QN AUTO: 28.6 PG (ref 26.6–33)
MCHC RBC AUTO-ENTMCNC: 33.3 G/DL (ref 31.5–35.7)
MCV RBC AUTO: 85.9 FL (ref 79–97)
MONOCYTES # BLD AUTO: 1.38 10*3/MM3 (ref 0.1–0.9)
MONOCYTES NFR BLD AUTO: 7.8 % (ref 5–12)
NEUTROPHILS NFR BLD AUTO: 14.73 10*3/MM3 (ref 1.7–7)
NEUTROPHILS NFR BLD AUTO: 83.8 % (ref 42.7–76)
NITRITE UR QL STRIP: NEGATIVE
NITRITE UR-MCNC: NEGATIVE MG/ML
NRBC BLD AUTO-RTO: 0 /100 WBC (ref 0–0.2)
PH UR STRIP.AUTO: 6 [PH] (ref 5–8)
PH UR: 6 [PH] (ref 5–8)
PLATELET # BLD AUTO: 350 10*3/MM3 (ref 140–450)
PMV BLD AUTO: 9.8 FL (ref 6–12)
POTASSIUM SERPL-SCNC: 4 MMOL/L (ref 3.5–5.2)
PROT SERPL-MCNC: 7.4 G/DL (ref 6–8.5)
PROT UR QL STRIP: NEGATIVE
PROT UR STRIP-MCNC: NEGATIVE MG/DL
RBC # BLD AUTO: 5.24 10*6/MM3 (ref 3.77–5.28)
RBC # UR STRIP: ABNORMAL /UL
RBC # UR STRIP: NORMAL /HPF
REF LAB TEST METHOD: NORMAL
SODIUM SERPL-SCNC: 133 MMOL/L (ref 136–145)
SP GR UR STRIP: 1.01 (ref 1–1.03)
SP GR UR: 1.03 (ref 1–1.03)
SQUAMOUS #/AREA URNS HPF: NORMAL /HPF
UROBILINOGEN UR QL STRIP: ABNORMAL
UROBILINOGEN UR QL: NORMAL
WBC # UR STRIP: NORMAL /HPF
WBC NRBC COR # BLD AUTO: 17.58 10*3/MM3 (ref 3.4–10.8)
WHOLE BLOOD HOLD COAG: NORMAL
WHOLE BLOOD HOLD SPECIMEN: NORMAL

## 2025-07-23 PROCEDURE — 81001 URINALYSIS AUTO W/SCOPE: CPT | Performed by: STUDENT IN AN ORGANIZED HEALTH CARE EDUCATION/TRAINING PROGRAM

## 2025-07-23 PROCEDURE — 99285 EMERGENCY DEPT VISIT HI MDM: CPT | Performed by: STUDENT IN AN ORGANIZED HEALTH CARE EDUCATION/TRAINING PROGRAM

## 2025-07-23 PROCEDURE — 96361 HYDRATE IV INFUSION ADD-ON: CPT

## 2025-07-23 PROCEDURE — 99214 OFFICE O/P EST MOD 30 MIN: CPT | Performed by: FAMILY MEDICINE

## 2025-07-23 PROCEDURE — G0378 HOSPITAL OBSERVATION PER HR: HCPCS

## 2025-07-23 PROCEDURE — 81025 URINE PREGNANCY TEST: CPT | Performed by: FAMILY MEDICINE

## 2025-07-23 PROCEDURE — 81003 URINALYSIS AUTO W/O SCOPE: CPT | Performed by: FAMILY MEDICINE

## 2025-07-23 PROCEDURE — 25010000002 PIPERACILLIN SOD-TAZOBACTAM PER 1 G: Performed by: STUDENT IN AN ORGANIZED HEALTH CARE EDUCATION/TRAINING PROGRAM

## 2025-07-23 PROCEDURE — 74177 CT ABD & PELVIS W/CONTRAST: CPT

## 2025-07-23 PROCEDURE — 25010000002 DIPHENHYDRAMINE PER 50 MG: Performed by: STUDENT IN AN ORGANIZED HEALTH CARE EDUCATION/TRAINING PROGRAM

## 2025-07-23 PROCEDURE — 99223 1ST HOSP IP/OBS HIGH 75: CPT | Performed by: FAMILY MEDICINE

## 2025-07-23 PROCEDURE — 25810000003 SODIUM CHLORIDE 0.9 % SOLUTION: Performed by: STUDENT IN AN ORGANIZED HEALTH CARE EDUCATION/TRAINING PROGRAM

## 2025-07-23 PROCEDURE — 25510000001 IOPAMIDOL 61 % SOLUTION: Performed by: STUDENT IN AN ORGANIZED HEALTH CARE EDUCATION/TRAINING PROGRAM

## 2025-07-23 PROCEDURE — 25010000002 MORPHINE PER 10 MG: Performed by: STUDENT IN AN ORGANIZED HEALTH CARE EDUCATION/TRAINING PROGRAM

## 2025-07-23 PROCEDURE — 80053 COMPREHEN METABOLIC PANEL: CPT | Performed by: STUDENT IN AN ORGANIZED HEALTH CARE EDUCATION/TRAINING PROGRAM

## 2025-07-23 PROCEDURE — 96375 TX/PRO/DX INJ NEW DRUG ADDON: CPT

## 2025-07-23 PROCEDURE — 83690 ASSAY OF LIPASE: CPT | Performed by: STUDENT IN AN ORGANIZED HEALTH CARE EDUCATION/TRAINING PROGRAM

## 2025-07-23 PROCEDURE — 25010000002 ONDANSETRON PER 1 MG: Performed by: STUDENT IN AN ORGANIZED HEALTH CARE EDUCATION/TRAINING PROGRAM

## 2025-07-23 PROCEDURE — 25010000002 METHYLPREDNISOLONE PER 125 MG: Performed by: STUDENT IN AN ORGANIZED HEALTH CARE EDUCATION/TRAINING PROGRAM

## 2025-07-23 PROCEDURE — 85025 COMPLETE CBC W/AUTO DIFF WBC: CPT | Performed by: STUDENT IN AN ORGANIZED HEALTH CARE EDUCATION/TRAINING PROGRAM

## 2025-07-23 PROCEDURE — 83605 ASSAY OF LACTIC ACID: CPT | Performed by: FAMILY MEDICINE

## 2025-07-23 PROCEDURE — 25810000003 SODIUM CHLORIDE 0.9 % SOLUTION: Performed by: FAMILY MEDICINE

## 2025-07-23 RX ORDER — SODIUM CHLORIDE 9 MG/ML
40 INJECTION, SOLUTION INTRAVENOUS AS NEEDED
Status: DISCONTINUED | OUTPATIENT
Start: 2025-07-23 | End: 2025-07-25 | Stop reason: HOSPADM

## 2025-07-23 RX ORDER — SODIUM CHLORIDE 9 MG/ML
125 INJECTION, SOLUTION INTRAVENOUS CONTINUOUS
Status: DISCONTINUED | OUTPATIENT
Start: 2025-07-23 | End: 2025-07-25 | Stop reason: HOSPADM

## 2025-07-23 RX ORDER — SODIUM CHLORIDE 0.9 % (FLUSH) 0.9 %
10 SYRINGE (ML) INJECTION AS NEEDED
Status: DISCONTINUED | OUTPATIENT
Start: 2025-07-23 | End: 2025-07-25 | Stop reason: HOSPADM

## 2025-07-23 RX ORDER — ONDANSETRON 4 MG/1
4 TABLET, FILM COATED ORAL EVERY 8 HOURS PRN
Qty: 12 TABLET | Refills: 0 | Status: ON HOLD | OUTPATIENT
Start: 2025-07-23 | End: 2025-07-23

## 2025-07-23 RX ORDER — PANTOPRAZOLE SODIUM 40 MG/1
40 TABLET, DELAYED RELEASE ORAL
Status: DISCONTINUED | OUTPATIENT
Start: 2025-07-24 | End: 2025-07-25 | Stop reason: HOSPADM

## 2025-07-23 RX ORDER — ACETAMINOPHEN 325 MG/1
650 TABLET ORAL EVERY 4 HOURS PRN
Status: DISCONTINUED | OUTPATIENT
Start: 2025-07-23 | End: 2025-07-25 | Stop reason: HOSPADM

## 2025-07-23 RX ORDER — IOPAMIDOL 612 MG/ML
100 INJECTION, SOLUTION INTRAVASCULAR
Status: COMPLETED | OUTPATIENT
Start: 2025-07-23 | End: 2025-07-23

## 2025-07-23 RX ORDER — ONDANSETRON 2 MG/ML
4 INJECTION INTRAMUSCULAR; INTRAVENOUS EVERY 6 HOURS PRN
Status: DISCONTINUED | OUTPATIENT
Start: 2025-07-23 | End: 2025-07-25 | Stop reason: HOSPADM

## 2025-07-23 RX ORDER — ACETAMINOPHEN 650 MG/1
650 SUPPOSITORY RECTAL EVERY 4 HOURS PRN
Status: DISCONTINUED | OUTPATIENT
Start: 2025-07-23 | End: 2025-07-25 | Stop reason: HOSPADM

## 2025-07-23 RX ORDER — NEBIVOLOL 10 MG/1
10 TABLET ORAL DAILY
Status: DISCONTINUED | OUTPATIENT
Start: 2025-07-24 | End: 2025-07-25 | Stop reason: HOSPADM

## 2025-07-23 RX ORDER — MORPHINE SULFATE 2 MG/ML
2 INJECTION, SOLUTION INTRAMUSCULAR; INTRAVENOUS
Status: DISCONTINUED | OUTPATIENT
Start: 2025-07-23 | End: 2025-07-25 | Stop reason: HOSPADM

## 2025-07-23 RX ORDER — SODIUM CHLORIDE 0.9 % (FLUSH) 0.9 %
10 SYRINGE (ML) INJECTION EVERY 12 HOURS SCHEDULED
Status: DISCONTINUED | OUTPATIENT
Start: 2025-07-23 | End: 2025-07-25 | Stop reason: HOSPADM

## 2025-07-23 RX ORDER — METHYLPREDNISOLONE SODIUM SUCCINATE 125 MG/2ML
125 INJECTION, POWDER, LYOPHILIZED, FOR SOLUTION INTRAMUSCULAR; INTRAVENOUS ONCE
Status: COMPLETED | OUTPATIENT
Start: 2025-07-23 | End: 2025-07-23

## 2025-07-23 RX ORDER — DIPHENHYDRAMINE HYDROCHLORIDE 50 MG/ML
25 INJECTION, SOLUTION INTRAMUSCULAR; INTRAVENOUS ONCE
Status: COMPLETED | OUTPATIENT
Start: 2025-07-23 | End: 2025-07-23

## 2025-07-23 RX ORDER — NITROGLYCERIN 0.4 MG/1
0.4 TABLET SUBLINGUAL
Status: DISCONTINUED | OUTPATIENT
Start: 2025-07-23 | End: 2025-07-25 | Stop reason: HOSPADM

## 2025-07-23 RX ORDER — OXYCODONE HYDROCHLORIDE 5 MG/1
5 TABLET ORAL EVERY 4 HOURS PRN
Qty: 12 TABLET | Refills: 0 | Status: ON HOLD | OUTPATIENT
Start: 2025-07-23 | End: 2025-07-23

## 2025-07-23 RX ORDER — ONDANSETRON 2 MG/ML
4 INJECTION INTRAMUSCULAR; INTRAVENOUS ONCE
Status: COMPLETED | OUTPATIENT
Start: 2025-07-23 | End: 2025-07-23

## 2025-07-23 RX ORDER — NALOXONE HCL 0.4 MG/ML
0.4 VIAL (ML) INJECTION
Status: DISCONTINUED | OUTPATIENT
Start: 2025-07-23 | End: 2025-07-25 | Stop reason: HOSPADM

## 2025-07-23 RX ORDER — ACETAMINOPHEN 160 MG/5ML
650 SOLUTION ORAL EVERY 4 HOURS PRN
Status: DISCONTINUED | OUTPATIENT
Start: 2025-07-23 | End: 2025-07-25 | Stop reason: HOSPADM

## 2025-07-23 RX ADMIN — SODIUM CHLORIDE 125 ML/HR: 9 INJECTION, SOLUTION INTRAVENOUS at 19:48

## 2025-07-23 RX ADMIN — IOPAMIDOL 100 ML: 612 INJECTION, SOLUTION INTRAVENOUS at 15:31

## 2025-07-23 RX ADMIN — METHYLPREDNISOLONE SODIUM SUCCINATE 125 MG: 125 INJECTION, POWDER, FOR SOLUTION INTRAMUSCULAR; INTRAVENOUS at 15:03

## 2025-07-23 RX ADMIN — SODIUM CHLORIDE 1000 ML: 9 INJECTION, SOLUTION INTRAVENOUS at 14:59

## 2025-07-23 RX ADMIN — ONDANSETRON 4 MG: 2 INJECTION, SOLUTION INTRAMUSCULAR; INTRAVENOUS at 15:01

## 2025-07-23 RX ADMIN — DIPHENHYDRAMINE HYDROCHLORIDE 25 MG: 50 INJECTION INTRAMUSCULAR; INTRAVENOUS at 15:00

## 2025-07-23 RX ADMIN — PIPERACILLIN AND TAZOBACTAM 3.38 G: 3; .375 INJECTION, POWDER, FOR SOLUTION INTRAVENOUS at 18:16

## 2025-07-23 RX ADMIN — MORPHINE SULFATE 4 MG: 4 INJECTION, SOLUTION INTRAMUSCULAR; INTRAVENOUS at 15:04

## 2025-07-23 NOTE — PROGRESS NOTES
Pharmacokinetic Consult - Piperacillin/Tazobactam Dosing  Vielka Jennings is a 45 y.o. female who has been consulted to dose Piperacillin/Tazobactam for intra-abdominal infection.    Current Antimicrobial Therapy    Anti-Infectives (From admission, onward)      Ordered     Dose/Rate Route Frequency Start Stop    07/23/25 1753  piperacillin-tazobactam (ZOSYN) IVPB 3.375 g IVPB in 100 mL NS (VTB)        Ordering Provider: John Plummer MD    3.375 g  over 4 Hours Intravenous Every 8 Hours 07/24/25 0000 07/28/25 2359    07/23/25 1752  Pharmacy To Dose: Piperacillin-tazobactam (Zosyn)        Ordering Provider: John Plummer MD     Not Applicable Continuous PRN 07/23/25 1752 07/28/25 1751    07/23/25 1735  piperacillin-tazobactam (ZOSYN) IVPB 3.375 g IVPB in 100 mL NS (VTB)        Ordering Provider: Ronnie Herrera DO    3.375 g  over 30 Minutes Intravenous Once 07/23/25 1750 07/23/25 1846            Microbiology Results (last 10 days)       ** No results found for the last 240 hours. **             Allergies  Gadolinium derivatives (mr contrast), Levaquin [levofloxacin], Sulfa antibiotics, and Zithromax [azithromycin]    Relevant clinical data and objective history reviewed:  Creatinine   Date Value Ref Range Status   07/23/2025 0.68 0.57 - 1.00 mg/dL Final     Estimated Creatinine Clearance: 117.7 mL/min (by C-G formula based on SCr of 0.68 mg/dL).  I/O last 3 completed shifts:  In: 1000 [IV Piggyback:1000]  Out: -   Patient weight: 87.5 kg (193 lb)    Asessment/Plan  Initiate Piperacillin/Tazobactam 3.375 gm IV every 8 hours  Pharmacy will monitor Ms. Jennings's renal function and clinical status and adjust the Piperacillin/Tazobactam dose and/or frequency as needed.    Thank you for the consult,     Mily PrinceD, BCPS   7/23/2025  19:39 EDT

## 2025-07-23 NOTE — PROGRESS NOTES
Follow Up Office Visit      Date: 2025   Patient Name: Vielka Jennings  : 1980   MRN: [id number]     Chief Complaint:    Chief Complaint   Patient presents with    Diverticulitis       History of Present Illness: Vielka Jennings is a 45 y.o. female who is here today for diverticulitis.    States that she is having pain in the LLQ pain.  States that she did go to urgent treatment center yesterday and was started on Augmentin, however the pain has continued to get somewhat worse.  Does note that she has a history of UTIs as well.  Denies any fever but has been having some nausea.    Subjective      Review of Systems:   Review of Systems   Constitutional:  Negative for appetite change and unexpected weight loss.   HENT:  Negative for trouble swallowing.    Eyes:  Negative for blurred vision and double vision.   Respiratory:  Negative for cough and shortness of breath.    Cardiovascular:  Negative for chest pain and leg swelling.   Gastrointestinal:  Positive for abdominal pain and nausea. Negative for blood in stool.   Endocrine: Negative for cold intolerance, heat intolerance and polyuria.   Musculoskeletal:  Negative for joint swelling.   Skin:  Negative for color change and bruise.   Neurological:  Negative for numbness and memory problem.   Hematological:  Does not bruise/bleed easily.   Psychiatric/Behavioral:  Negative for suicidal ideas and depressed mood. The patient is not nervous/anxious.        I have reviewed the patients family history, social history, past medical history, past surgical history and have updated it as appropriate.     Medications:     Current Outpatient Medications:     amoxicillin-clavulanate (AUGMENTIN) 875-125 MG per tablet, Take 1 tablet by mouth 2 (Two) Times a Day., Disp: , Rfl:     dexlansoprazole (Dexilant) 30 MG capsule, Take 1 capsule by mouth Daily., Disp: 90 capsule, Rfl: 3    nebivolol (BYSTOLIC) 10 MG tablet, Take 1 tablet by mouth Daily.,  "Disp: 90 tablet, Rfl: 3    norethindrone (MICRONOR) 0.35 MG tablet, Take 1 tablet by mouth Daily., Disp: 28 tablet, Rfl: 3    ondansetron (Zofran) 4 MG tablet, Take 1 tablet by mouth Every 8 (Eight) Hours As Needed for Nausea., Disp: 12 tablet, Rfl: 0    oxyCODONE (Roxicodone) 5 MG immediate release tablet, Take 1 tablet by mouth Every 4 (Four) Hours As Needed for Moderate Pain., Disp: 12 tablet, Rfl: 0    phenazopyridine (PYRIDIUM) 100 MG tablet, 1 tablet As Needed. (Patient not taking: Reported on 7/23/2025), Disp: , Rfl:     Allergies:   Allergies   Allergen Reactions    Gadolinium Derivatives (Mr Contrast) Itching     Immediately upon receiving 17ml of multihance, patient experienced itching of the throat.  No other symptoms were present.    Levaquin [Levofloxacin] Hives    Sulfa Antibiotics Hives    Zithromax [Azithromycin] Diarrhea       Objective     Physical Exam: Please see above  Vital Signs:   Vitals:    07/23/25 0906   Pulse: 82   Temp: 98.6 °F (37 °C)   SpO2: 97%   Weight: 87.5 kg (193 lb)   Height: 165.1 cm (65\")     Body mass index is 32.12 kg/m².    Physical Exam  Vitals and nursing note reviewed.   Constitutional:       Appearance: Normal appearance.      Comments: Does appear to be somewhat mild distress secondary to pain.   HENT:      Head: Normocephalic and atraumatic.   Eyes:      General: Lids are normal.      Conjunctiva/sclera: Conjunctivae normal.   Cardiovascular:      Rate and Rhythm: Normal rate and regular rhythm.   Pulmonary:      Effort: Pulmonary effort is normal.      Breath sounds: Normal breath sounds and air entry.   Abdominal:      General: Abdomen is flat. Bowel sounds are normal.      Palpations: Abdomen is soft.   Musculoskeletal:      Cervical back: Full passive range of motion without pain and normal range of motion.   Neurological:      General: No focal deficit present.      Mental Status: She is alert and oriented to person, place, and time.   Psychiatric:         " Attention and Perception: Attention normal.         Mood and Affect: Mood normal.         Behavior: Behavior normal. Behavior is cooperative.         Procedures    Results:   Labs:   TSH   Date Value Ref Range Status   06/21/2024 1.460 0.270 - 4.200 uIU/mL Final        POCT Results (if applicable):   Results for orders placed or performed in visit on 01/16/25   C-reactive Protein    Collection Time: 01/16/25  9:08 AM    Specimen: Blood   Result Value Ref Range    C-Reactive Protein 0.55 (H) 0.00 - 0.50 mg/dL   Rheumatoid Factor    Collection Time: 01/16/25  9:08 AM    Specimen: Blood   Result Value Ref Range    Rheumatoid Factor Quantitative <10.0 0.0 - 14.0 IU/mL   Sedimentation Rate    Collection Time: 01/16/25  9:08 AM    Specimen: Blood   Result Value Ref Range    Sed Rate 6 0 - 20 mm/hr   Uric Acid    Collection Time: 01/16/25  9:08 AM    Specimen: Blood   Result Value Ref Range    Uric Acid 3.9 2.4 - 5.7 mg/dL   Cyclic Citrul Peptide Antibody, IgG / IgA    Collection Time: 01/16/25  9:08 AM    Specimen: Blood   Result Value Ref Range    CCP Antibodies IgG/IgA 3 0 - 19 units   HLA-B27 Antigen    Collection Time: 01/16/25  9:08 AM    Specimen: Blood   Result Value Ref Range    HLA B27 Negative    CBC Auto Differential    Collection Time: 01/16/25  9:08 AM    Specimen: Blood   Result Value Ref Range    WBC 8.29 3.40 - 10.80 10*3/mm3    RBC 5.01 3.77 - 5.28 10*6/mm3    Hemoglobin 15.1 12.0 - 15.9 g/dL    Hematocrit 44.2 34.0 - 46.6 %    MCV 88.2 79.0 - 97.0 fL    MCH 30.1 26.6 - 33.0 pg    MCHC 34.2 31.5 - 35.7 g/dL    RDW 12.8 12.3 - 15.4 %    RDW-SD 41.1 37.0 - 54.0 fl    MPV 10.0 6.0 - 12.0 fL    Platelets 310 140 - 450 10*3/mm3    Neutrophil % 64.4 42.7 - 76.0 %    Lymphocyte % 26.9 19.6 - 45.3 %    Monocyte % 7.6 5.0 - 12.0 %    Eosinophil % 0.1 (L) 0.3 - 6.2 %    Basophil % 0.6 0.0 - 1.5 %    Immature Grans % 0.4 0.0 - 0.5 %    Neutrophils, Absolute 5.34 1.70 - 7.00 10*3/mm3    Lymphocytes, Absolute 2.23  0.70 - 3.10 10*3/mm3    Monocytes, Absolute 0.63 0.10 - 0.90 10*3/mm3    Eosinophils, Absolute 0.01 0.00 - 0.40 10*3/mm3    Basophils, Absolute 0.05 0.00 - 0.20 10*3/mm3    Immature Grans, Absolute 0.03 0.00 - 0.05 10*3/mm3    nRBC 0.0 0.0 - 0.2 /100 WBC       Imaging:   No valid procedures specified.         Assessment / Plan      Assessment/Plan:   Diagnoses and all orders for this visit:    1. Hematuria, unspecified type (Primary)  -     CT Abdomen Pelvis Stone Protocol; Future  -     oxyCODONE (Roxicodone) 5 MG immediate release tablet; Take 1 tablet by mouth Every 4 (Four) Hours As Needed for Moderate Pain.  Dispense: 12 tablet; Refill: 0    2. Left lower quadrant abdominal pain  -     CT Abdomen Pelvis Stone Protocol; Future  -     oxyCODONE (Roxicodone) 5 MG immediate release tablet; Take 1 tablet by mouth Every 4 (Four) Hours As Needed for Moderate Pain.  Dispense: 12 tablet; Refill: 0    Other orders  -     ondansetron (Zofran) 4 MG tablet; Take 1 tablet by mouth Every 8 (Eight) Hours As Needed for Nausea.  Dispense: 12 tablet; Refill: 0      Total time spent: 25 minutes was spent reviewing patient's previous medical records including previous ER visits for diverticulitis as well as reviewing patient's previous lab work as well as coordinating getting stat imaging performed.         Vaccine Counseling:      Follow Up:   No follow-ups on file.        Leonel Mccoy,   [facility]

## 2025-07-23 NOTE — PLAN OF CARE
Goal Outcome Evaluation:      New admit from the ED.       Problem: Adult Inpatient Plan of Care  Goal: Plan of Care Review  Outcome: Progressing  Goal: Patient-Specific Goal (Individualized)  Outcome: Progressing  Goal: Absence of Hospital-Acquired Illness or Injury  Outcome: Progressing  Goal: Optimal Comfort and Wellbeing  Outcome: Progressing  Goal: Readiness for Transition of Care  Outcome: Progressing  Intervention: Mutually Develop Transition Plan  Recent Flowsheet Documentation  Taken 7/23/2025 1843 by Lainey Campbell LPN  Transportation Anticipated: family or friend will provide  Patient/Family Anticipated Services at Transition: none  Patient/Family Anticipates Transition to: home with family  Taken 7/23/2025 1841 by Lainey Campbell LPN  Equipment Currently Used at Home: none

## 2025-07-23 NOTE — H&P
[facility]      Name:  Vielka Jennings   Age:  45 y.o.  Sex:  female  :  1980  MRN:  [id number]   Visit Number:  [id number]  Admission Date:  2025  Date Of Service:  25  Primary Care Physician:  Patricio Bass DO    Chief Complaint:     Abdominal pain    History Of Presenting Illness:      Patient is a 45 years old female with a past medical history of diverticulosis, endometriosis, IBS, GERD, who presented to the ER with a chief complaint of abdominal pain.  Patient reporting left lower quadrant squeezing severe abdominal pain that started 3 days ago, she had associated nausea but no vomiting.  No fever, chills or changes in bowel movements.  She went to an urgent care and was prescribed Augmentin couple days ago, she went to her PCP again today, she took the antibiotics but her symptoms did not improve.  Patient presented to the ER due to persistent and worsening abdominal pain.  Reporting history of diverticulosis.    On ER evaluation, vitals were stable and afebrile. Workup in the ER was significant for WBC of 17.5 and sodium of 133.  Otherwise CBC and CMP nonactionable.  CT abdomen pelvis showed Acute mid sigmoid diverticulitis with a small amount of pelvic ascites and what appears to be a contained for perforation posteriorly and laterally; recommend follow-up to document resolution. Bowel gas pattern suggesting ileus. Enlarged, fatty liver.  Case was discussed with general surgery Dr. Oakley by ER provider, recommended n.p.o., IV antibiotics and admission to the hospital and he will consult.  Patient received 1 L bolus of normal saline, Solu-Medrol 125 mg, morphine, Zofran, Benadryl and was started on Zosyn while in the ER.  Hospitalist consulted for admission, further management and treatment.    Review Of Systems:    All systems were reviewed and negative except as mentioned in history of presenting illness, assessment and  plan.    Past Medical History: Patient  has a past medical history of Abnormal Pap smear of cervix, Acid reflux, Anemia, Body piercing, Bronchitis (10/03/2022), Diverticulitis, Endometriosis, Heart murmur, History of cervical dysplasia, History of gastroesophageal reflux (GERD), HPV (human papilloma virus) infection, IBS (irritable bowel syndrome), Irritable bowel syndrome with diarrhea (06/03/2016), Migraine, Port-wine stain, Seasonal allergies, and Varicella.    Past Surgical History: Patient  has a past surgical history that includes Cholecystectomy; Dilation and curettage of uterus; Ankle surgery (Left); Esophagogastroduodenoscopy; Skin biopsy; hysteroscopy endometrial ablation; Other surgical history; LEEP (N/A, 08/24/2018); Cervical biopsy w/ loop electrode excision; Colonoscopy (N/A, 11/04/2022); d & c hysteroscopy (N/A, 04/03/2023); Foot surgery; Trigger point injection; Hysteroscopy; Endometrial ablation; Gynecologic cryosurgery; Exploratory laparotomy; and Laparoscopic cholecystectomy (2006).    Social History: Patient  reports that she has been smoking cigarettes. She started smoking about 25 years ago. She has a 12.9 pack-year smoking history. She has been exposed to tobacco smoke. She has never used smokeless tobacco. She reports that she does not drink alcohol and does not use drugs.    Family History:  Patient's family history has been reviewed and found to be noncontributory.     Allergies:      Gadolinium derivatives (mr contrast), Levaquin [levofloxacin], Sulfa antibiotics, and Zithromax [azithromycin]    Home Medications:    Prior to Admission Medications       Prescriptions Last Dose Informant Patient Reported? Taking?    amoxicillin-clavulanate (AUGMENTIN) 875-125 MG per tablet   Yes No    Take 1 tablet by mouth 2 (Two) Times a Day.    dexlansoprazole (Dexilant) 30 MG capsule   No No    Take 1 capsule by mouth Daily.    nebivolol (BYSTOLIC) 10 MG tablet   No No    Take 1 tablet by mouth Daily.  "   norethindrone (MICRONOR) 0.35 MG tablet   No No    Take 1 tablet by mouth Daily.    ondansetron (Zofran) 4 MG tablet   No No    Take 1 tablet by mouth Every 8 (Eight) Hours As Needed for Nausea.    oxyCODONE (Roxicodone) 5 MG immediate release tablet   No No    Take 1 tablet by mouth Every 4 (Four) Hours As Needed for Moderate Pain.    phenazopyridine (PYRIDIUM) 100 MG tablet   Yes No    1 tablet As Needed.    Patient not taking:  Reported on 7/23/2025          ED Medications:    Medications   sodium chloride 0.9 % flush 10 mL (has no administration in time range)   piperacillin-tazobactam (ZOSYN) IVPB 3.375 g IVPB in 100 mL NS (VTB) (has no administration in time range)   Pharmacy To Dose: Piperacillin-tazobactam (Zosyn) (has no administration in time range)   piperacillin-tazobactam (ZOSYN) IVPB 3.375 g IVPB in 100 mL NS (VTB) (has no administration in time range)   morphine injection 4 mg (4 mg Intravenous Given 7/23/25 1504)   ondansetron (ZOFRAN) injection 4 mg (4 mg Intravenous Given 7/23/25 1501)   sodium chloride 0.9 % bolus 1,000 mL (0 mL Intravenous Stopped 7/23/25 1706)   methylPREDNISolone sodium succinate (SOLU-Medrol) injection 125 mg (125 mg Intravenous Given 7/23/25 1503)   diphenhydrAMINE (BENADRYL) injection 25 mg (25 mg Intravenous Given 7/23/25 1500)   iopamidol (ISOVUE-300) 61 % injection 100 mL (100 mL Intravenous Given 7/23/25 1531)     Vital Signs:  Temp:  [98.3 °F (36.8 °C)-98.6 °F (37 °C)] 98.3 °F (36.8 °C)  Heart Rate:  [82-92] 89  Resp:  [18] 18  BP: (113-136)/(67-98) 120/67        07/23/25  1311   Weight: 87.5 kg (193 lb)     Body mass index is 31.15 kg/m².    Physical Exam:     Most recent vital Signs: /67   Pulse 89   Temp 98.3 °F (36.8 °C) (Oral)   Resp 18   Ht 167.6 cm (66\")   Wt 87.5 kg (193 lb)   LMP 07/09/2025 (Approximate)   SpO2 92%   BMI 31.15 kg/m²     Physical Exam  Vitals and nursing note reviewed.   Constitutional:       General: She is not in acute " distress.     Appearance: She is ill-appearing.   HENT:      Head: Normocephalic and atraumatic.      Right Ear: External ear normal.      Left Ear: External ear normal.      Nose: Nose normal.      Mouth/Throat:      Mouth: Mucous membranes are dry.   Eyes:      Extraocular Movements: Extraocular movements intact.      Conjunctiva/sclera: Conjunctivae normal.      Pupils: Pupils are equal, round, and reactive to light.   Cardiovascular:      Rate and Rhythm: Normal rate and regular rhythm.      Pulses: Normal pulses.      Heart sounds: Normal heart sounds.   Pulmonary:      Effort: Pulmonary effort is normal. No respiratory distress.      Breath sounds: Normal breath sounds. No wheezing or rhonchi.   Abdominal:      General: Bowel sounds are normal. There is no distension.      Palpations: Abdomen is soft.      Tenderness: There is abdominal tenderness in the left upper quadrant.   Musculoskeletal:         General: Normal range of motion.      Cervical back: Normal range of motion and neck supple.      Right lower leg: No edema.      Left lower leg: No edema.   Skin:     General: Skin is warm and dry.      Findings: No rash.   Neurological:      General: No focal deficit present.      Mental Status: She is alert and oriented to person, place, and time. Mental status is at baseline.      Motor: No weakness.   Psychiatric:         Mood and Affect: Mood normal.         Behavior: Behavior normal.         Thought Content: Thought content normal.         Laboratory data:    I have reviewed the labs done in the emergency room.    Results from last 7 days   Lab Units 07/23/25  1319   SODIUM mmol/L 133*   POTASSIUM mmol/L 4.0   CHLORIDE mmol/L 97*   CO2 mmol/L 23.4   BUN mg/dL 8.0   CREATININE mg/dL 0.68   CALCIUM mg/dL 10.0   BILIRUBIN mg/dL 1.2   ALK PHOS U/L 109   ALT (SGPT) U/L 26   AST (SGOT) U/L 18   GLUCOSE mg/dL 102*     Results from last 7 days   Lab Units 07/23/25  1319   WBC 10*3/mm3 17.58*   HEMOGLOBIN g/dL 15.0    HEMATOCRIT % 45.0   PLATELETS 10*3/mm3 350                     Results from last 7 days   Lab Units 07/23/25  1319   LIPASE U/L 19         Results from last 7 days   Lab Units 07/23/25  1359 07/23/25  1103   COLOR UA  [place]* [place]*   SPECIFIC GRAVITY, URINE   --  1.030   GLUCOSE UA  Negative  --    KETONES UA  15 mg/dL (1+)* Trace*   BLOOD UA  Moderate (2+)*  --    LEUKOCYTES UA  Negative Trace*   PH, URINE  6.0 6.0   BILIRUBIN UA  Negative Small (1+)*   UROBILINOGEN UA  0.2 E.U./dL Normal   RBC UA /HPF 0-2  --    WBC UA /HPF None Seen  --        Pain Management Panel           No data to display                Radiology:    CT Abdomen Pelvis With Contrast  Result Date: 7/23/2025  PROCEDURE: CT ABDOMEN PELVIS W CONTRAST-  HISTORY: LLQ abdominal pain.  COMPARISON: August 31, 2022..  PROCEDURE: The patient was injected with IV contrast. Axial images were obtained from the lung bases to the pubic symphysis by computed tomography. This study was performed with techniques to keep radiation doses as low as reasonably achievable, (ALARA). Individualized dose reduction techniques using automated exposure control or adjustment of mA and/or kV according to the patient size were employed.  FINDINGS:  ABDOMEN: The lung bases demonstrate dependent interstitial edema. A 3.6 cm partially calcified granuloma again identified posterior right lower lobe. The heart is proper size. The liver demonstrates diffuse fatty infiltration and is mildly enlarged at 16 cm. There are metallic surgical clips in the right upper quadrant consistent with previous cholecystectomy. The spleen again demonstrates a circumscribed, hypodense lesion laterally measuring 19 Hounsfield units consistent with a cyst and measuring 15 mm in the AP diameter. No diffuse no adrenal mass is present.  The pancreas is unremarkable. The kidneys are unremarkable, without evidence of mass or hydronephrosis. The aorta is proper caliber. There is no free fluid or  adenopathy. Vascular calcifications noted.  PELVIS: The GI tract demonstrates air-fluid levels in nondistended small bowel, suspect ileus. There is a significant inflammatory process in the left lower quadrant with marked wall thickening of the mid sigmoid colon. There are multiple diverticula in the sigmoid colon. There is significant infiltration of the adjacent fat with a small amount of fluid in the distal left paracolic gutter as well as the right lower quadrant and minimal fluid in the left side of the pelvis; findings are most consistent with acute diverticulitis. Follow-up by CT or colonoscopy to document resolution of this process to exclude underlying malignancy is recommended. Inflammatory process does indent the left dome of the bladder. No significant bladder wall thickening is identified. Uterus is midline. No free air identified. There is a small area of extraluminal air seen just lateral and posterior to the mid sigmoid colon measuring up to 15 mm in diameter suggesting contained perforation.. The appendix is identified and appears normal. The urinary bladder is unremarkable. There is no free fluid, adenopathy, or inflammatory process.      Acute mid sigmoid diverticulitis with a small amount of pelvic ascites and what appears to be a contained for perforation posteriorly and laterally; recommend follow-up to document resolution.  Bowel gas pattern suggesting ileus.  Enlarged, fatty liver.    CTDI: 10.02 mGy DLP:501.19 mGy.cm  This report was signed and finalized on 7/23/2025 5:06 PM by Lula Fitch MD.        Assessment:    Acute sigmoid diverticulitis with contained perforation, POA  Sepsis, secondary to 1  Diverticulosis  Endometriosis  IBS  GERD    Plan:    Patient is admitted for further management and treatment.    Acute sigmoid diverticulitis with contained perforation  Sepsis  -Met SIRS criteria with leukocytosis, heart rate 92 on admission with diverticulitis as a source of infection.  -  General Surgery Dr. Oakley consulted, appreciate recommendations  - Coverage with Zosyn  - Pain control  - Will keep patient n.p.o.  - IV fluids    -Continue home meds as warranted.  -Further orders as indicated per clinical course.    Risk Assessment: High  DVT Prophylaxis: SCDs until surgery evaluation  Code Status: Full  Diet: N.p.o.    Advance Care Planning   ACP discussion was held with the patient during this visit. Patient does not have an advance directive, information provided.       John Plummer MD  07/23/25  17:55 EDT    Dictated utilizing Dragon dictation.

## 2025-07-23 NOTE — ED PROVIDER NOTES
"     [facility]  Emergency Department Encounter  Emergency Medicine Physician Note       Pt Name: Vielka Jennings  MRN: [id number]  Pt :   1980  Room Number:    Date of encounter:  2025  PCP: Patricio Bass DO  ED Physician: Ronnie Herrera DO    HPI:  Vielka Jennings is a 45 y.o. female who presents to the ED with chief complaint of abdominal pain.  Onset 2 days ago.  Located to left lower quadrant with radiation to the left flank.  Associated nausea.  Duration of symptoms constant.  No modifying factors.    PAST MEDICAL HISTORY  Past Medical History:   Diagnosis Date    Abnormal Pap smear of cervix     Acid reflux     Anemia     Body piercing     EARS    Bronchitis 10/03/2022    has residual cough and nasal congestion (assessed 11/3/22)    Diverticulitis     Endometriosis     Heart murmur     REPORTS \"MURMUR AS A BABY\"    History of cervical dysplasia     History of gastroesophageal reflux (GERD)     HPV (human papilloma virus) infection     IBS (irritable bowel syndrome)     REPORTS PAST HISTORY, NO CURRENT ISSUES    Irritable bowel syndrome with diarrhea 2016    Migraine     Port-wine stain     Seasonal allergies     Varicella      Current Outpatient Medications   Medication Instructions    amoxicillin-clavulanate (AUGMENTIN) 875-125 MG per tablet 1 tablet, 2 Times Daily    dexlansoprazole (DEXILANT) 30 mg, Oral, Daily    nebivolol (BYSTOLIC) 10 mg, Oral, Daily    norethindrone (MICRONOR) 0.35 mg, Oral, Daily    ondansetron (ZOFRAN) 4 mg, Oral, Every 8 Hours PRN    oxyCODONE (ROXICODONE) 5 mg, Oral, Every 4 Hours PRN    phenazopyridine (PYRIDIUM) 100 mg, As Needed      PAST SURGICAL HISTORY  Past Surgical History:   Procedure Laterality Date    ANKLE SURGERY Left     REPORTS LIGAMENT REPAIR, THEN LATER HAD STITCHES REMOVED (1 YEAR POST OP)    CERVICAL BIOPSY  W/ LOOP ELECTRODE EXCISION      CHOLECYSTECTOMY      COLONOSCOPY N/A 2022    Procedure: " "COLONOSCOPY WITH POLYPECTOMY X3;  Surgeon: Tico Manzano MD;  Location: [facility] ENDOSCOPY;  Service: Gastroenterology;  Laterality: N/A;    D & C HYSTEROSCOPY N/A 04/03/2023    Procedure: DILATATION AND CURETTAGE HYSTEROSCOPY;  Surgeon: Candy Knight MD;  Location: [facility] OR;  Service: Obstetrics/Gynecology;  Laterality: N/A;    DILATATION AND CURETTAGE      ENDOMETRIAL ABLATION      ENDOSCOPY      EXPLORATORY LAPAROTOMY      FOOT SURGERY      surgery to repair torn ligaments in left ankle in 2010/2011    GYNECOLOGIC CRYOSURGERY      HYSTEROSCOPY      HYSTEROSCOPY ENDOMETRIAL ABLATION      LAPAROSCOPIC CHOLECYSTECTOMY  2006    LEEP N/A 08/24/2018    Procedure: LOOP ELECTROCAUTERY EXCISION PROCEDURE;  Surgeon: Candy Knight MD;  Location: [facility] OR;  Service: Obstetrics/Gynecology    OTHER SURGICAL HISTORY      AFTER DELIVERY OF DAUGHTER REPORTS SURGICAL INTERVENTION FROM \"TEAR THAT WAS INTERNAL\"    SKIN BIOPSY      benign    TRIGGER POINT INJECTION         FAMILY HISTORY  Family History   Problem Relation Age of Onset    Cancer Mother         Skin Cancer    Hyperlipidemia Mother     Melanoma Mother     Hyperlipidemia Father     Hypertension Father     Hyperlipidemia Brother     Hypertension Brother     Hyperlipidemia Maternal Grandmother     Heart attack Paternal Grandmother     Hyperlipidemia Paternal Grandmother     Thyroid disease Maternal Aunt     Hyperlipidemia Maternal Grandfather     Heart attack Paternal Grandfather     Hyperlipidemia Paternal Grandfather     Breast cancer Neg Hx        SOCIAL HISTORY  Social History     Socioeconomic History    Marital status:    Tobacco Use    Smoking status: Every Day     Current packs/day: 0.50     Average packs/day: 0.5 packs/day for 25.8 years (12.9 ttl pk-yrs)     Types: Cigarettes     Start date: 1/1/2000     Passive exposure: Current    Smokeless tobacco: Never   Vaping Use    Vaping status: Never Used   Substance and Sexual Activity    Alcohol use: No    " Drug use: No    Sexual activity: Not Currently     Partners: Male     Birth control/protection: Birth control pill     ALLERGIES  Gadolinium derivatives (mr contrast), Levaquin [levofloxacin], Sulfa antibiotics, and Zithromax [azithromycin]    REVIEW OF SYSTEMS  All systems reviewed and negative except for those discussed in HPI.     PHYSICAL EXAM  ED Triage Vitals [07/23/25 1311]   Temp Heart Rate Resp BP SpO2   98.3 °F (36.8 °C) 92 18 136/98 98 %      Temp src Heart Rate Source Patient Position BP Location FiO2 (%)   Oral Monitor -- Left arm --     I have reviewed the triage vital signs and nursing notes.    General: Alert.  Nontoxic appearance.  No acute distress.  Head: Normocephalic.  Atraumatic.  Eyes: No scleral icterus.  ENT: Moist mucous membranes.  Cardiovascular: Regular rate and rhythm.  No murmurs.  No rubs.  2+ distal pulses bilaterally.  Respiratory: No wheezing. No rales.  No rhonchi.  GI: Abdomen is soft.  Nondistended.  + Tenderness left lower quadrant.  No rebound.  No guarding.  No CVA tenderness.  MSK: Moves all 4 extremities.  Neurologic: Oriented x 3.  No focal deficits.  Skin: No edema. No erythema. No pallor. No cyanosis.  Psych: Normal mood and affect.    LAB RESULTS  Recent Results (from the past 24 hours)   POCT urinalysis dipstick, automated    Collection Time: 07/23/25 11:03 AM    Specimen: Urine   Result Value Ref Range    Color Liberty (A) Yellow, Straw, Dark Yellow, Liberty    Clarity, UA Clear Clear    Specific Gravity  1.030 1.005 - 1.030    pH, Urine 6.0 5.0 - 8.0    Leukocytes Trace (A) Negative    Nitrite, UA Negative Negative    Protein, POC Negative Negative mg/dL    Glucose, UA Negative Negative mg/dL    Ketones, UA Trace (A) Negative    Urobilinogen, UA Normal Normal, 0.2 E.U./dL    Bilirubin Small (1+) (A) Negative    Blood, UA 3+ (A) Negative    Lot Number [id number]     Expiration Date 07/08/2026    Comprehensive Metabolic Panel    Collection Time: 07/23/25  1:19 PM     Specimen: Blood   Result Value Ref Range    Glucose 102 (H) 65 - 99 mg/dL    BUN 8.0 6.0 - 20.0 mg/dL    Creatinine 0.68 0.57 - 1.00 mg/dL    Sodium 133 (L) 136 - 145 mmol/L    Potassium 4.0 3.5 - 5.2 mmol/L    Chloride 97 (L) 98 - 107 mmol/L    CO2 23.4 22.0 - 29.0 mmol/L    Calcium 10.0 8.6 - 10.5 mg/dL    Total Protein 7.4 6.0 - 8.5 g/dL    Albumin 4.3 3.5 - 5.2 g/dL    ALT (SGPT) 26 1 - 33 U/L    AST (SGOT) 18 1 - 32 U/L    Alkaline Phosphatase 109 39 - 117 U/L    Total Bilirubin 1.2 0.0 - 1.2 mg/dL    Globulin 3.1 gm/dL    A/G Ratio 1.4 g/dL    BUN/Creatinine Ratio 11.8 7.0 - 25.0    Anion Gap 12.6 5.0 - 15.0 mmol/L    eGFR 110.3 >60.0 mL/min/1.73   Lipase    Collection Time: 07/23/25  1:19 PM    Specimen: Blood   Result Value Ref Range    Lipase 19 13 - 60 U/L   Green Top (Gel)    Collection Time: 07/23/25  1:19 PM   Result Value Ref Range    Extra Tube Hold for add-ons.    Lavender Top    Collection Time: 07/23/25  1:19 PM   Result Value Ref Range    Extra Tube hold for add-on    Gold Top - SST    Collection Time: 07/23/25  1:19 PM   Result Value Ref Range    Extra Tube Hold for add-ons.    Light Blue Top    Collection Time: 07/23/25  1:19 PM   Result Value Ref Range    Extra Tube Hold for add-ons.    CBC Auto Differential    Collection Time: 07/23/25  1:19 PM    Specimen: Blood   Result Value Ref Range    WBC 17.58 (H) 3.40 - 10.80 10*3/mm3    RBC 5.24 3.77 - 5.28 10*6/mm3    Hemoglobin 15.0 12.0 - 15.9 g/dL    Hematocrit 45.0 34.0 - 46.6 %    MCV 85.9 79.0 - 97.0 fL    MCH 28.6 26.6 - 33.0 pg    MCHC 33.3 31.5 - 35.7 g/dL    RDW 13.9 12.3 - 15.4 %    RDW-SD 44.2 37.0 - 54.0 fl    MPV 9.8 6.0 - 12.0 fL    Platelets 350 140 - 450 10*3/mm3    Neutrophil % 83.8 (H) 42.7 - 76.0 %    Lymphocyte % 7.4 (L) 19.6 - 45.3 %    Monocyte % 7.8 5.0 - 12.0 %    Eosinophil % 0.1 (L) 0.3 - 6.2 %    Basophil % 0.2 0.0 - 1.5 %    Immature Grans % 0.7 (H) 0.0 - 0.5 %    Neutrophils, Absolute 14.73 (H) 1.70 - 7.00 10*3/mm3     Lymphocytes, Absolute 1.30 0.70 - 3.10 10*3/mm3    Monocytes, Absolute 1.38 (H) 0.10 - 0.90 10*3/mm3    Eosinophils, Absolute 0.01 0.00 - 0.40 10*3/mm3    Basophils, Absolute 0.04 0.00 - 0.20 10*3/mm3    Immature Grans, Absolute 0.12 (H) 0.00 - 0.05 10*3/mm3    nRBC 0.0 0.0 - 0.2 /100 WBC   Urinalysis With Microscopic If Indicated (No Culture) - Urine, Clean Catch    Collection Time: 07/23/25  1:59 PM    Specimen: Urine, Clean Catch   Result Value Ref Range    Color, UA Orange (A) Yellow, Straw    Appearance, UA Clear Clear    pH, UA 6.0 5.0 - 8.0    Specific Gravity, UA 1.012 1.005 - 1.030    Glucose, UA Negative Negative    Ketones, UA 15 mg/dL (1+) (A) Negative    Bilirubin, UA Negative Negative    Blood, UA Moderate (2+) (A) Negative    Protein, UA Negative Negative    Leuk Esterase, UA Negative Negative    Nitrite, UA Negative Negative    Urobilinogen, UA 0.2 E.U./dL 0.2 - 1.0 E.U./dL   Urinalysis, Microscopic Only - Urine, Clean Catch    Collection Time: 07/23/25  1:59 PM    Specimen: Urine, Clean Catch   Result Value Ref Range    RBC, UA 0-2 None Seen, 0-2 /HPF    WBC, UA None Seen None Seen, 0-2 /HPF    Bacteria, UA None Seen None Seen /HPF    Squamous Epithelial Cells, UA 0-2 None Seen, 0-2 /HPF    Hyaline Casts, UA None Seen None Seen /LPF    Methodology Manual Light Microscopy        RADIOLOGY  CT Abdomen Pelvis With Contrast  Result Date: 7/23/2025  PROCEDURE: CT ABDOMEN PELVIS W CONTRAST-  HISTORY: LLQ abdominal pain.  COMPARISON: August 31, 2022..  PROCEDURE: The patient was injected with IV contrast. Axial images were obtained from the lung bases to the pubic symphysis by computed tomography. This study was performed with techniques to keep radiation doses as low as reasonably achievable, (ALARA). Individualized dose reduction techniques using automated exposure control or adjustment of mA and/or kV according to the patient size were employed.  FINDINGS:  ABDOMEN: The lung bases demonstrate dependent  interstitial edema. A 3.6 cm partially calcified granuloma again identified posterior right lower lobe. The heart is proper size. The liver demonstrates diffuse fatty infiltration and is mildly enlarged at 16 cm. There are metallic surgical clips in the right upper quadrant consistent with previous cholecystectomy. The spleen again demonstrates a circumscribed, hypodense lesion laterally measuring 19 Hounsfield units consistent with a cyst and measuring 15 mm in the AP diameter. No diffuse no adrenal mass is present.  The pancreas is unremarkable. The kidneys are unremarkable, without evidence of mass or hydronephrosis. The aorta is proper caliber. There is no free fluid or adenopathy. Vascular calcifications noted.  PELVIS: The GI tract demonstrates air-fluid levels in nondistended small bowel, suspect ileus. There is a significant inflammatory process in the left lower quadrant with marked wall thickening of the mid sigmoid colon. There are multiple diverticula in the sigmoid colon. There is significant infiltration of the adjacent fat with a small amount of fluid in the distal left paracolic gutter as well as the right lower quadrant and minimal fluid in the left side of the pelvis; findings are most consistent with acute diverticulitis. Follow-up by CT or colonoscopy to document resolution of this process to exclude underlying malignancy is recommended. Inflammatory process does indent the left dome of the bladder. No significant bladder wall thickening is identified. Uterus is midline. No free air identified. There is a small area of extraluminal air seen just lateral and posterior to the mid sigmoid colon measuring up to 15 mm in diameter suggesting contained perforation.. The appendix is identified and appears normal. The urinary bladder is unremarkable. There is no free fluid, adenopathy, or inflammatory process.      Acute mid sigmoid diverticulitis with a small amount of pelvic ascites and what appears to  be a contained for perforation posteriorly and laterally; recommend follow-up to document resolution.  Bowel gas pattern suggesting ileus.  Enlarged, fatty liver.    CTDI: 10.02 mGy DLP:501.19 mGy.cm  This report was signed and finalized on 7/23/2025 5:06 PM by Lula Fitch MD.        PROCEDURES  Procedures    RISK STRATIFICATION    MEDICAL DECISION MAKING  45 y.o. female with past medical history listed above who presents with left lower quad abdominal pain.    Vital signs WNL.    Based on clinical presentation and physical exam, differential diagnosis includes, but is not limited to, diverticulitis, colitis, renal colic.    I have discussed the indication, risk, and alternatives of the following high risk medications: IV morphine    At least 3 different tests have been ordered on this patient.    Medications administered in ED:  Medications   sodium chloride 0.9 % flush 10 mL (has no administration in time range)   piperacillin-tazobactam (ZOSYN) IVPB 3.375 g IVPB in 100 mL NS (VTB) (has no administration in time range)   Pharmacy To Dose: Piperacillin-tazobactam (Zosyn) (has no administration in time range)   piperacillin-tazobactam (ZOSYN) IVPB 3.375 g IVPB in 100 mL NS (VTB) (has no administration in time range)   morphine injection 4 mg (4 mg Intravenous Given 7/23/25 1504)   ondansetron (ZOFRAN) injection 4 mg (4 mg Intravenous Given 7/23/25 1501)   sodium chloride 0.9 % bolus 1,000 mL (0 mL Intravenous Stopped 7/23/25 1706)   methylPREDNISolone sodium succinate (SOLU-Medrol) injection 125 mg (125 mg Intravenous Given 7/23/25 1503)   diphenhydrAMINE (BENADRYL) injection 25 mg (25 mg Intravenous Given 7/23/25 1500)   iopamidol (ISOVUE-300) 61 % injection 100 mL (100 mL Intravenous Given 7/23/25 1531)     Please see ED course below for my interpretation of the ED workup.  ED Course as of 07/23/25 1757   Wed Jul 23, 2025   1724 I reviewed the labs listed above.     Notable findings are highlighted below.    Old  laboratory data was reviewed from the medical records and compared to today's results.   [JS]   1724 WBC(!): 17.58 [JS]   1724 Sodium(!): 133 [JS]   1724 Urinalysis With Microscopic If Indicated (No Culture) - Urine, Clean Catch(!) [JS]   1724 Blood, UA(!): Moderate (2+) [JS]   1724 CT Abdomen Pelvis With Contrast  I have independently reviewed and interpreted the CT abdomen pelvis.  My interpretation is negative for small bowel obstruction.    Radiologist notes the following: Acute mid sigmoid diverticulitis with a small amount of pelvic ascites and what appears to be a contained for perforation posteriorly and laterally; recommend follow-up to document resolution.  Bowel gas pattern suggesting ileus.  Enlarged, fatty liver. [JS]   1735 Case discussed with Dr. Oakley (general surgery).  Agrees to see patient as consult.  Agrees with medical admission.  Keep NPO. [JS]   1737 No clinical evidence of sepsis. Rx IV Zosyn. [JS]   1752 On re-evaluation, patient resting comfortably.  Vital signs remained stable on room air. Will proceed with medical admission for further workup and management.  I discussed the findings of the ED workup with the patient including my recommendation for admission.  Patient agreeable with plan and disposition.    Case discussed with Dr. Plummer (hospitalist) who agrees to evaluate and admit the patient.  We discussed the HPI, pertinent PMHx, ED course and workup.    Chronic conditions affecting care: None    Social determinants of health impacting treatment or disposition: None [JS]      ED Course User Index  [JS] Ronnie Herrera DO     REPEAT VITAL SIGNS  AS OF 17:57 EDT VITALS:  BP - 120/67  HR - 89  TEMP - 98.3 °F (36.8 °C) (Oral)  O2 SATS - 92%    DIAGNOSIS  Final diagnoses:   Diverticulitis of colon with perforation     DISPOSITION  ED Disposition       ED Disposition   Decision to Admit    Condition   --    Comment   Level of Care: Telemetry [5]   Diagnosis: Diverticulitis [286096]    Certification: I Certify That Inpatient Hospital Services Are Medically Necessary For Greater Than 2 Midnights               Please note that portions of this document were completed with voice recognition software.        Ronnie Herrera,   07/23/25 [id number]

## 2025-07-24 DIAGNOSIS — K21.9 GASTROESOPHAGEAL REFLUX DISEASE WITHOUT ESOPHAGITIS: ICD-10-CM

## 2025-07-24 DIAGNOSIS — I10 ESSENTIAL HYPERTENSION: ICD-10-CM

## 2025-07-24 LAB
ANION GAP SERPL CALCULATED.3IONS-SCNC: 11 MMOL/L (ref 5–15)
BUN SERPL-MCNC: 11 MG/DL (ref 6–20)
BUN/CREAT SERPL: 19.3 (ref 7–25)
CALCIUM SPEC-SCNC: 9.9 MG/DL (ref 8.6–10.5)
CHLORIDE SERPL-SCNC: 104 MMOL/L (ref 98–107)
CO2 SERPL-SCNC: 22 MMOL/L (ref 22–29)
CREAT SERPL-MCNC: 0.57 MG/DL (ref 0.57–1)
DEPRECATED RDW RBC AUTO: 44.7 FL (ref 37–54)
EGFRCR SERPLBLD CKD-EPI 2021: 115.1 ML/MIN/1.73
ERYTHROCYTE [DISTWIDTH] IN BLOOD BY AUTOMATED COUNT: 13.9 % (ref 12.3–15.4)
GLUCOSE SERPL-MCNC: 127 MG/DL (ref 65–99)
HCT VFR BLD AUTO: 40.7 % (ref 34–46.6)
HGB BLD-MCNC: 13.1 G/DL (ref 12–15.9)
MCH RBC QN AUTO: 28.1 PG (ref 26.6–33)
MCHC RBC AUTO-ENTMCNC: 32.2 G/DL (ref 31.5–35.7)
MCV RBC AUTO: 87.2 FL (ref 79–97)
PLATELET # BLD AUTO: 315 10*3/MM3 (ref 140–450)
PMV BLD AUTO: 10 FL (ref 6–12)
POTASSIUM SERPL-SCNC: 4.3 MMOL/L (ref 3.5–5.2)
RBC # BLD AUTO: 4.67 10*6/MM3 (ref 3.77–5.28)
SODIUM SERPL-SCNC: 137 MMOL/L (ref 136–145)
WBC NRBC COR # BLD AUTO: 13.31 10*3/MM3 (ref 3.4–10.8)

## 2025-07-24 PROCEDURE — 25010000002 CEFTRIAXONE PER 250 MG: Performed by: STUDENT IN AN ORGANIZED HEALTH CARE EDUCATION/TRAINING PROGRAM

## 2025-07-24 PROCEDURE — 99232 SBSQ HOSP IP/OBS MODERATE 35: CPT | Performed by: STUDENT IN AN ORGANIZED HEALTH CARE EDUCATION/TRAINING PROGRAM

## 2025-07-24 PROCEDURE — 99222 1ST HOSP IP/OBS MODERATE 55: CPT | Performed by: SURGERY

## 2025-07-24 PROCEDURE — 25010000002 METRONIDAZOLE 500 MG/100ML SOLUTION: Performed by: STUDENT IN AN ORGANIZED HEALTH CARE EDUCATION/TRAINING PROGRAM

## 2025-07-24 PROCEDURE — 25010000002 DIPHENHYDRAMINE PER 50 MG: Performed by: STUDENT IN AN ORGANIZED HEALTH CARE EDUCATION/TRAINING PROGRAM

## 2025-07-24 PROCEDURE — 96366 THER/PROPH/DIAG IV INF ADDON: CPT

## 2025-07-24 PROCEDURE — 25010000002 MORPHINE PER 10 MG: Performed by: FAMILY MEDICINE

## 2025-07-24 PROCEDURE — 80048 BASIC METABOLIC PNL TOTAL CA: CPT | Performed by: FAMILY MEDICINE

## 2025-07-24 PROCEDURE — G0378 HOSPITAL OBSERVATION PER HR: HCPCS

## 2025-07-24 PROCEDURE — 25010000002 PIPERACILLIN SOD-TAZOBACTAM PER 1 G: Performed by: FAMILY MEDICINE

## 2025-07-24 PROCEDURE — 96376 TX/PRO/DX INJ SAME DRUG ADON: CPT

## 2025-07-24 PROCEDURE — 96365 THER/PROPH/DIAG IV INF INIT: CPT

## 2025-07-24 PROCEDURE — 85027 COMPLETE CBC AUTOMATED: CPT | Performed by: FAMILY MEDICINE

## 2025-07-24 RX ORDER — DIPHENHYDRAMINE HYDROCHLORIDE 50 MG/ML
25 INJECTION, SOLUTION INTRAMUSCULAR; INTRAVENOUS ONCE
Status: COMPLETED | OUTPATIENT
Start: 2025-07-24 | End: 2025-07-24

## 2025-07-24 RX ORDER — METRONIDAZOLE 500 MG/100ML
500 INJECTION, SOLUTION INTRAVENOUS EVERY 8 HOURS
Status: DISCONTINUED | OUTPATIENT
Start: 2025-07-24 | End: 2025-07-25 | Stop reason: HOSPADM

## 2025-07-24 RX ORDER — NEBIVOLOL 10 MG/1
10 TABLET ORAL DAILY
Qty: 90 TABLET | Refills: 3 | Status: SHIPPED | OUTPATIENT
Start: 2025-07-24

## 2025-07-24 RX ORDER — DEXLANSOPRAZOLE 30 MG/1
30 CAPSULE, DELAYED RELEASE ORAL DAILY
Qty: 90 CAPSULE | Refills: 3 | Status: SHIPPED | OUTPATIENT
Start: 2025-07-24

## 2025-07-24 RX ADMIN — CEFTRIAXONE 2000 MG: 2 INJECTION, POWDER, FOR SOLUTION INTRAMUSCULAR; INTRAVENOUS at 17:06

## 2025-07-24 RX ADMIN — MORPHINE SULFATE 2 MG: 2 INJECTION, SOLUTION INTRAMUSCULAR; INTRAVENOUS at 01:42

## 2025-07-24 RX ADMIN — MORPHINE SULFATE 2 MG: 2 INJECTION, SOLUTION INTRAMUSCULAR; INTRAVENOUS at 12:16

## 2025-07-24 RX ADMIN — PIPERACILLIN AND TAZOBACTAM 3.38 G: 3; .375 INJECTION, POWDER, FOR SOLUTION INTRAVENOUS at 00:18

## 2025-07-24 RX ADMIN — MORPHINE SULFATE 2 MG: 2 INJECTION, SOLUTION INTRAMUSCULAR; INTRAVENOUS at 21:45

## 2025-07-24 RX ADMIN — PANTOPRAZOLE SODIUM 40 MG: 40 TABLET, DELAYED RELEASE ORAL at 06:16

## 2025-07-24 RX ADMIN — Medication 10 ML: at 00:19

## 2025-07-24 RX ADMIN — Medication 10 ML: at 20:53

## 2025-07-24 RX ADMIN — METRONIDAZOLE 500 MG: 500 INJECTION, SOLUTION INTRAVENOUS at 17:05

## 2025-07-24 RX ADMIN — DIPHENHYDRAMINE HYDROCHLORIDE 25 MG: 50 INJECTION, SOLUTION INTRAMUSCULAR; INTRAVENOUS at 17:05

## 2025-07-24 RX ADMIN — PIPERACILLIN AND TAZOBACTAM 3.38 G: 3; .375 INJECTION, POWDER, FOR SOLUTION INTRAVENOUS at 07:51

## 2025-07-24 NOTE — THERAPY DISCHARGE NOTE
PT order received. Pt is IND and at her baseline per pt and RN. PT educated pt on frequent mobility, pt expressing understanding. PT to sign off.

## 2025-07-24 NOTE — PLAN OF CARE
Goal Outcome Evaluation:  Plan of Care Reviewed With: patient        Progress: improving          Patient has been stable throughout shift with no significant changes. PRN pain medications given to control pain. VSS on 2L. NPO at this time awaiting possible surgery. Plan of care ongoing.

## 2025-07-24 NOTE — PLAN OF CARE
Goal Outcome Evaluation:                 Problem: Adult Inpatient Plan of Care  Goal: Plan of Care Review  Outcome: Progressing             Pt doing better this afternoon, prn morphine given, up ambulating the halls, IVF continued.  Pt given a clear liquid diet per GI no surgery.

## 2025-07-24 NOTE — CASE MANAGEMENT/SOCIAL WORK
Discharge Planning Assessment   Hall     Patient Name: Vielka Jennings  MRN: [id number]  Today's Date: 7/24/2025    Admit Date: 7/23/2025    Plan: Plans to return home with 11 yr old daughter   Discharge Needs Assessment       Row Name 07/24/25 1213       Living Environment    People in Home child(michael), dependent    Name(s) of People in Home 11 year old daughter    Current Living Arrangements home    Duration at Residence 9 yrs    Potentially Unsafe Housing Conditions unable to assess    In the past 12 months has the electric, gas, oil, or water company threatened to shut off services in your home? No    Primary Care Provided by self    Provides Primary Care For child(michael)    Family Caregiver if Needed parent(s)    Quality of Family Relationships supportive    Able to Return to Prior Arrangements yes       Resource/Environmental Concerns    Resource/Environmental Concerns none    Transportation Concerns none       Transportation Needs    In the past 12 months, has lack of transportation kept you from medical appointments or from getting medications? no    In the past 12 months, has lack of transportation kept you from meetings, work, or from getting things needed for daily living? No       Food Insecurity    Within the past 12 months, you worried that your food would run out before you got the money to buy more. Sometimes    Within the past 12 months, the food you bought just didn't last and you didn't have money to get more. Pt Unable       Transition Planning    Patient/Family Anticipates Transition to home with family    Patient/Family Anticipated Services at Transition none    Transportation Anticipated family or friend will provide       Discharge Needs Assessment    Readmission Within the Last 30 Days no previous admission in last 30 days    Equipment Currently Used at Home none    Concerns to be Addressed adjustment to diagnosis/illness    Do you want help finding or keeping work or a job? I do  not need or want help    Do you want help with school or training? For example, starting or completing job training or getting a high school diploma, GED or equivalent No    Anticipated Changes Related to Illness other (see comments)  None at this time    Equipment Needed After Discharge none    Provided Post Acute Provider List? N/A    N/A Provider List Comment No needs identified at this time    Current Discharge Risk other (see comments)  recurrent GI issues                   Discharge Plan       Row Name 07/24/25 1223       Plan    Plan Plans to return home with 11 yr old daughter    Plan Comments Pt awake and alert at time of visit.  Pt's mother at bedside and pt agreeable with mother staying during conversation.  Pt reports does not have a POA or Living Will.  She lives with her 11 yr old daughter in a single level house and has lived there for 9 yrs.  Pt denies having any medical equipment and denies need for any,  Pt is employed part time as clerical at [employer]  She reports is Independent of ADLs. Pt does drive .  She did identify Food insecurity during completion of SDOH.  Informed CM can provide Food Bank Bag at [place].  Pt uses [email or web address] in [place] but reported agreed to using [facility] Beds at [place].  Pt plans to return home at [place] with her 11 yr old daughter.   Informed pt should equipment of service need identified at [facility], CM will assist as possible.                  Continued Care and Services - Admitted Since 7/23/2025    No active coordination exists.          Demographic Summary       Row Name 07/24/25 1205       General Information    Admission Type inpatient    Arrived From home    Referral Source admission list    Reason for Consult discharge planning    Preferred Language English       Contact Information    Permission Granted to Share Info With ;family/designee    Contact Information Obtained for     Contact Information Comments Hillary Abraham/Mother/(H)  [phone]  (Cell) [phone]                   Functional Status       Row Name 07/24/25 1210       Functional Status    Usual Activity Tolerance good    Current Activity Tolerance good       Physical Activity    On average, how many days per week do you engage in moderate to strenuous exercise (like a brisk walk)? 0 days    On average, how many minutes do you engage in exercise at this level? 0 min    Number of minutes of exercise per week 0       Functional Status, IADL    Medications independent    Meal Preparation independent    Housekeeping independent    Laundry independent    Shopping independent    If for any reason you need help with day-to-day activities such as bathing, preparing meals, shopping, managing finances, etc., do you get the help you need? I don't need any help    IADL Comments Independent of ADLs       Mental Status    General Appearance WDL WDL       Employment/    Employment Status employed part-time;other (see comments)  Reports Clerical at [employer]    Shift Worked first shift    Current or Previous Occupation other (see comments)  Clerical work at [employer]                   Psychosocial    No documentation.                  Abuse/Neglect    No documentation.                  Legal    No documentation.                  Substance Abuse    No documentation.                  Patient Forms    No documentation.                     Shelbi Chavez RN

## 2025-07-24 NOTE — PAYOR COMM NOTE
"To:  Jean  From: Andra Guerrero RN  Phone: [phone]  Fax: [phone]  NPI: [id number]  TIN: [id number]  Member ID: [id number]   MRN: [id number]    OBSERVATION NOTIFICATION    Vielka Jennings (45 y.o. Female)       Date of Birth   1980    Social Security Number       Address   [address] [place] [id number]    Home Phone       MRN   [id number]       Yarsani   None    Marital Status                               Admission Date   7/23/2025    Admission Type   Emergency    Admitting Provider   Kerley, Brian Joseph, DO    Attending Provider   Kerley, Brian Joseph, DO    Department, Room/Bed   [facility] TELEMETRY 3, 323/1       Discharge Date       Discharge Disposition       Discharge Destination                                 Attending Provider: Kerley, Brian Joseph, DO    Allergies: Gadolinium Derivatives (Mr Contrast), Levaquin [Levofloxacin], Sulfa Antibiotics, Zithromax [Azithromycin]    Isolation: None   Infection: None   Code Status: CPR    Ht: 167.6 cm (66\")   Wt: 87.5 kg (193 lb)    Admission Cmt: None   Principal Problem: Diverticulitis [K57.92]                   Active Insurance as of 7/23/2025       Primary Coverage       Payor Plan Insurance Group Employer/Plan Group    [employer] EMPLOYEE [id number]       Payor Plan Address Payor Plan Phone Number Payor Plan Fax Number Effective Dates    [address] [phone]  1/1/2015 - None Entered    [place] [address]         Subscriber Name Subscriber Birth Date Member ID       VIELKA JENNINGS 1980 [id number]                     Emergency Contacts        (Rel.) Home Phone Work Phone Mobile Phone    Hillary Abraham (Mother) [phone] -- [phone]                 History & Physical        John Plummer MD at 07/23/25 1755            [facility] HOSPITALIST   HISTORY AND PHYSICAL      Name:  Vielka Jennings   Age:  45 y.o.  Sex:  " female  :  1980  MRN:  [id number]   Visit Number:  [id number]  Admission Date:  2025  Date Of Service:  25  Primary Care Physician:  Patricio Bass DO    Chief Complaint:     Abdominal pain    History Of Presenting Illness:      Patient is a 45 years old female with a past medical history of diverticulosis, endometriosis, IBS, GERD, who presented to the ER with a chief complaint of abdominal pain.  Patient reporting left lower quadrant squeezing severe abdominal pain that started 3 days ago, she had associated nausea but no vomiting.  No fever, chills or changes in bowel movements.  She went to an urgent care and was prescribed Augmentin couple days ago, she went to her PCP again today, she took the antibiotics but her symptoms did not improve.  Patient presented to the ER due to persistent and worsening abdominal pain.  Reporting history of diverticulosis.    On ER evaluation, vitals were stable and afebrile. Workup in the ER was significant for WBC of 17.5 and sodium of 133.  Otherwise CBC and CMP nonactionable.  CT abdomen pelvis showed Acute mid sigmoid diverticulitis with a small amount of pelvic ascites and what appears to be a contained for perforation posteriorly and laterally; recommend follow-up to document resolution. Bowel gas pattern suggesting ileus. Enlarged, fatty liver.  Case was discussed with general surgery Dr. Oakley by ER provider, recommended n.p.o., IV antibiotics and admission to the hospital and he will consult.  Patient received 1 L bolus of normal saline, Solu-Medrol 125 mg, morphine, Zofran, Benadryl and was started on Zosyn while in the ER.  Hospitalist consulted for admission, further management and treatment.    Review Of Systems:    All systems were reviewed and negative except as mentioned in history of presenting illness, assessment and plan.    Past Medical History: Patient  has a past medical history of Abnormal Pap smear of cervix, Acid reflux, Anemia,  Body piercing, Bronchitis (10/03/2022), Diverticulitis, Endometriosis, Heart murmur, History of cervical dysplasia, History of gastroesophageal reflux (GERD), HPV (human papilloma virus) infection, IBS (irritable bowel syndrome), Irritable bowel syndrome with diarrhea (06/03/2016), Migraine, Port-wine stain, Seasonal allergies, and Varicella.    Past Surgical History: Patient  has a past surgical history that includes Cholecystectomy; Dilation and curettage of uterus; Ankle surgery (Left); Esophagogastroduodenoscopy; Skin biopsy; hysteroscopy endometrial ablation; Other surgical history; LEEP (N/A, 08/24/2018); Cervical biopsy w/ loop electrode excision; Colonoscopy (N/A, 11/04/2022); d & c hysteroscopy (N/A, 04/03/2023); Foot surgery; Trigger point injection; Hysteroscopy; Endometrial ablation; Gynecologic cryosurgery; Exploratory laparotomy; and Laparoscopic cholecystectomy (2006).    Social History: Patient  reports that she has been smoking cigarettes. She started smoking about 25 years ago. She has a 12.9 pack-year smoking history. She has been exposed to tobacco smoke. She has never used smokeless tobacco. She reports that she does not drink alcohol and does not use drugs.    Family History:  Patient's family history has been reviewed and found to be noncontributory.     Allergies:      Gadolinium derivatives (mr contrast), Levaquin [levofloxacin], Sulfa antibiotics, and Zithromax [azithromycin]    Home Medications:    Prior to Admission Medications       Prescriptions Last Dose Informant Patient Reported? Taking?    amoxicillin-clavulanate (AUGMENTIN) 875-125 MG per tablet   Yes No    Take 1 tablet by mouth 2 (Two) Times a Day.    dexlansoprazole (Dexilant) 30 MG capsule   No No    Take 1 capsule by mouth Daily.    nebivolol (BYSTOLIC) 10 MG tablet   No No    Take 1 tablet by mouth Daily.    norethindrone (MICRONOR) 0.35 MG tablet   No No    Take 1 tablet by mouth Daily.    ondansetron (Zofran) 4 MG tablet    "No No    Take 1 tablet by mouth Every 8 (Eight) Hours As Needed for Nausea.    oxyCODONE (Roxicodone) 5 MG immediate release tablet   No No    Take 1 tablet by mouth Every 4 (Four) Hours As Needed for Moderate Pain.    phenazopyridine (PYRIDIUM) 100 MG tablet   Yes No    1 tablet As Needed.    Patient not taking:  Reported on 7/23/2025          ED Medications:    Medications   sodium chloride 0.9 % flush 10 mL (has no administration in time range)   piperacillin-tazobactam (ZOSYN) IVPB 3.375 g IVPB in 100 mL NS (VTB) (has no administration in time range)   Pharmacy To Dose: Piperacillin-tazobactam (Zosyn) (has no administration in time range)   piperacillin-tazobactam (ZOSYN) IVPB 3.375 g IVPB in 100 mL NS (VTB) (has no administration in time range)   morphine injection 4 mg (4 mg Intravenous Given 7/23/25 1504)   ondansetron (ZOFRAN) injection 4 mg (4 mg Intravenous Given 7/23/25 1501)   sodium chloride 0.9 % bolus 1,000 mL (0 mL Intravenous Stopped 7/23/25 1706)   methylPREDNISolone sodium succinate (SOLU-Medrol) injection 125 mg (125 mg Intravenous Given 7/23/25 1503)   diphenhydrAMINE (BENADRYL) injection 25 mg (25 mg Intravenous Given 7/23/25 1500)   iopamidol (ISOVUE-300) 61 % injection 100 mL (100 mL Intravenous Given 7/23/25 1531)     Vital Signs:  Temp:  [98.3 °F (36.8 °C)-98.6 °F (37 °C)] 98.3 °F (36.8 °C)  Heart Rate:  [82-92] 89  Resp:  [18] 18  BP: (113-136)/(67-98) 120/67        07/23/25  1311   Weight: 87.5 kg (193 lb)     Body mass index is 31.15 kg/m².    Physical Exam:     Most recent vital Signs: /67   Pulse 89   Temp 98.3 °F (36.8 °C) (Oral)   Resp 18   Ht 167.6 cm (66\")   Wt 87.5 kg (193 lb)   LMP 07/09/2025 (Approximate)   SpO2 92%   BMI 31.15 kg/m²     Physical Exam  Vitals and nursing note reviewed.   Constitutional:       General: She is not in acute distress.     Appearance: She is ill-appearing.   HENT:      Head: Normocephalic and atraumatic.      Right Ear: External ear " normal.      Left Ear: External ear normal.      Nose: Nose normal.      Mouth/Throat:      Mouth: Mucous membranes are dry.   Eyes:      Extraocular Movements: Extraocular movements intact.      Conjunctiva/sclera: Conjunctivae normal.      Pupils: Pupils are equal, round, and reactive to light.   Cardiovascular:      Rate and Rhythm: Normal rate and regular rhythm.      Pulses: Normal pulses.      Heart sounds: Normal heart sounds.   Pulmonary:      Effort: Pulmonary effort is normal. No respiratory distress.      Breath sounds: Normal breath sounds. No wheezing or rhonchi.   Abdominal:      General: Bowel sounds are normal. There is no distension.      Palpations: Abdomen is soft.      Tenderness: There is abdominal tenderness in the left upper quadrant.   Musculoskeletal:         General: Normal range of motion.      Cervical back: Normal range of motion and neck supple.      Right lower leg: No edema.      Left lower leg: No edema.   Skin:     General: Skin is warm and dry.      Findings: No rash.   Neurological:      General: No focal deficit present.      Mental Status: She is alert and oriented to person, place, and time. Mental status is at baseline.      Motor: No weakness.   Psychiatric:         Mood and Affect: Mood normal.         Behavior: Behavior normal.         Thought Content: Thought content normal.         Laboratory data:    I have reviewed the labs done in the emergency room.    Results from last 7 days   Lab Units 07/23/25  1319   SODIUM mmol/L 133*   POTASSIUM mmol/L 4.0   CHLORIDE mmol/L 97*   CO2 mmol/L 23.4   BUN mg/dL 8.0   CREATININE mg/dL 0.68   CALCIUM mg/dL 10.0   BILIRUBIN mg/dL 1.2   ALK PHOS U/L 109   ALT (SGPT) U/L 26   AST (SGOT) U/L 18   GLUCOSE mg/dL 102*     Results from last 7 days   Lab Units 07/23/25  1319   WBC 10*3/mm3 17.58*   HEMOGLOBIN g/dL 15.0   HEMATOCRIT % 45.0   PLATELETS 10*3/mm3 350                     Results from last 7 days   Lab Units 07/23/25  1319   LIPASE  U/L 19         Results from last 7 days   Lab Units 07/23/25  1359 07/23/25  1103   COLOR UA  [place]* [place]*   SPECIFIC GRAVITY, URINE   --  1.030   GLUCOSE UA  Negative  --    KETONES UA  15 mg/dL (1+)* Trace*   BLOOD UA  Moderate (2+)*  --    LEUKOCYTES UA  Negative Trace*   PH, URINE  6.0 6.0   BILIRUBIN UA  Negative Small (1+)*   UROBILINOGEN UA  0.2 E.U./dL Normal   RBC UA /HPF 0-2  --    WBC UA /HPF None Seen  --        Pain Management Panel           No data to display                Radiology:    CT Abdomen Pelvis With Contrast  Result Date: 7/23/2025  PROCEDURE: CT ABDOMEN PELVIS W CONTRAST-  HISTORY: LLQ abdominal pain.  COMPARISON: August 31, 2022..  PROCEDURE: The patient was injected with IV contrast. Axial images were obtained from the lung bases to the pubic symphysis by computed tomography. This study was performed with techniques to keep radiation doses as low as reasonably achievable, (ALARA). Individualized dose reduction techniques using automated exposure control or adjustment of mA and/or kV according to the patient size were employed.  FINDINGS:  ABDOMEN: The lung bases demonstrate dependent interstitial edema. A 3.6 cm partially calcified granuloma again identified posterior right lower lobe. The heart is proper size. The liver demonstrates diffuse fatty infiltration and is mildly enlarged at 16 cm. There are metallic surgical clips in the right upper quadrant consistent with previous cholecystectomy. The spleen again demonstrates a circumscribed, hypodense lesion laterally measuring 19 Hounsfield units consistent with a cyst and measuring 15 mm in the AP diameter. No diffuse no adrenal mass is present.  The pancreas is unremarkable. The kidneys are unremarkable, without evidence of mass or hydronephrosis. The aorta is proper caliber. There is no free fluid or adenopathy. Vascular calcifications noted.  PELVIS: The GI tract demonstrates air-fluid levels in nondistended small bowel, suspect  ileus. There is a significant inflammatory process in the left lower quadrant with marked wall thickening of the mid sigmoid colon. There are multiple diverticula in the sigmoid colon. There is significant infiltration of the adjacent fat with a small amount of fluid in the distal left paracolic gutter as well as the right lower quadrant and minimal fluid in the left side of the pelvis; findings are most consistent with acute diverticulitis. Follow-up by CT or colonoscopy to document resolution of this process to exclude underlying malignancy is recommended. Inflammatory process does indent the left dome of the bladder. No significant bladder wall thickening is identified. Uterus is midline. No free air identified. There is a small area of extraluminal air seen just lateral and posterior to the mid sigmoid colon measuring up to 15 mm in diameter suggesting contained perforation.. The appendix is identified and appears normal. The urinary bladder is unremarkable. There is no free fluid, adenopathy, or inflammatory process.      Acute mid sigmoid diverticulitis with a small amount of pelvic ascites and what appears to be a contained for perforation posteriorly and laterally; recommend follow-up to document resolution.  Bowel gas pattern suggesting ileus.  Enlarged, fatty liver.    CTDI: 10.02 mGy DLP:501.19 mGy.cm  This report was signed and finalized on 7/23/2025 5:06 PM by Lula Fitch MD.        Assessment:    Acute sigmoid diverticulitis with contained perforation, POA  Sepsis, secondary to 1  Diverticulosis  Endometriosis  IBS  GERD    Plan:    Patient is admitted for further management and treatment.    Acute sigmoid diverticulitis with contained perforation  Sepsis  -Met SIRS criteria with leukocytosis, heart rate 92 on admission with diverticulitis as a source of infection.  - General Surgery Dr. Oakley consulted, appreciate recommendations  - Coverage with Zosyn  - Pain control  - Will keep patient n.p.o.  -  IV fluids    -Continue home meds as warranted.  -Further orders as indicated per clinical course.    Risk Assessment: High  DVT Prophylaxis: SCDs until surgery evaluation  Code Status: Full  Diet: N.p.o.    Advance Care Planning  ACP discussion was held with the patient during this visit. Patient does not have an advance directive, information provided.       John Plummer MD  07/23/25  17:55 EDT    Dictated utilizing Dragon dictation.    Electronically signed by John Plummer MD at 07/23/25 6990

## 2025-07-24 NOTE — PROGRESS NOTES
[facility] HOSPITALIST    PROGRESS NOTE    Name:  Vielka Jennings   Age:  45 y.o.  Sex:  female  :  1980  MRN:  [id number]   Visit Number:  [id number]  Admission Date:  2025  Date Of Service:  25  Primary Care Physician:  Patricio Bass DO     LOS: 1 day :    Chief Complaint:      Follow-up; abdominal pain    Subjective:    Still having some abdominal discomfort, some nausea but no vomiting.  Generally feels better than yesterday.    Hospital Course:    Vielka Jennings is a 45 years old female with a past medical history of diverticulosis, endometriosis, IBS, GERD, who presented to the ER with a chief complaint of abdominal pain.  Patient reporting left lower quadrant squeezing severe abdominal pain that started 3 days ago, she had associated nausea but no vomiting.  No fever, chills or changes in bowel movements.  She went to an urgent care and was prescribed Augmentin couple days prior to presentation, she went to her PCP again day of presentation, she took the antibiotics but her symptoms did not improve.  Patient presented to the ER due to persistent and worsening abdominal pain.  Reporting history of diverticulosis.     On ER evaluation, vitals were stable and afebrile. Workup in the ER was significant for WBC of 17.5 and sodium of 133.  Otherwise CBC and CMP nonactionable.  CT abdomen pelvis showed Acute mid sigmoid diverticulitis with a small amount of pelvic ascites and what appears to be a contained for perforation posteriorly and laterally; recommend follow-up to document resolution. Bowel gas pattern suggesting ileus. Enlarged, fatty liver.  Case was discussed with general surgery Dr. Oakley by ER provider, recommended n.p.o., IV antibiotics and admission to the hospital and he will consult.  Patient received 1 L bolus of normal saline, Solu-Medrol 125 mg, morphine, Zofran, Benadryl and was started on Zosyn while in the ER.  Hospitalist consulted for  "admission, further management and treatment.    Review of Systems:     All systems were reviewed and negative except as mentioned in subjective, assessment and plan.    Vital Signs:    Temp:  [97.8 °F (36.6 °C)-98.6 °F (37 °C)] 97.8 °F (36.6 °C)  Heart Rate:  [74-92] 74  Resp:  [18] 18  BP: (113-136)/(67-98) 125/75    Intake and output:    I/O last 3 completed shifts:  In: 1000 [IV Piggyback:1000]  Out: -   No intake/output data recorded.    Physical Examination:    General Appearance:  Alert and cooperative.    Head:  Atraumatic and normocephalic.   Eyes: Conjunctivae and sclerae normal, no icterus. No pallor.   Throat: No oral lesions, no thrush, oral mucosa moist.   Neck: Supple, trachea midline, no thyromegaly.   Lungs:   Breath sounds heard bilaterally equally.  No wheezing or crackles. No Pleural rub or bronchial breathing.   Heart:  Normal S1 and S2, no murmur, no gallop, no rub. No JVD.   Abdomen:   Normal bowel sounds, no masses, no organomegaly. Soft, left lower quadrant tenderness, nondistended, no rebound tenderness.   Extremities: Supple, no edema, no cyanosis, no clubbing.   Skin:    Neurologic: Alert and oriented x 3. No facial asymmetry. Moves all four limbs. No tremors.      Laboratory results:    Results from last 7 days   Lab Units 07/24/25  0519 07/23/25  1319   SODIUM mmol/L 137 133*   POTASSIUM mmol/L 4.3 4.0   CHLORIDE mmol/L 104 97*   CO2 mmol/L 22.0 23.4   BUN mg/dL 11.0 8.0   CREATININE mg/dL 0.57 0.68   CALCIUM mg/dL 9.9 10.0   BILIRUBIN mg/dL  --  1.2   ALK PHOS U/L  --  109   ALT (SGPT) U/L  --  26   AST (SGOT) U/L  --  18   GLUCOSE mg/dL 127* 102*     Results from last 7 days   Lab Units 07/24/25  0519 07/23/25  1319   WBC 10*3/mm3 13.31* 17.58*   HEMOGLOBIN g/dL 13.1 15.0   HEMATOCRIT % 40.7 45.0   PLATELETS 10*3/mm3 315 350                 No results for input(s): \"PHART\", \"GJJ2DSI\", \"PO2ART\", \"MLH8QSI\", \"BASEEXCESS\" in the last 8760 hours.   I have reviewed the patient's laboratory " results.    Radiology results:    CT Abdomen Pelvis With Contrast  Result Date: 7/23/2025  PROCEDURE: CT ABDOMEN PELVIS W CONTRAST-  HISTORY: LLQ abdominal pain.  COMPARISON: August 31, 2022..  PROCEDURE: The patient was injected with IV contrast. Axial images were obtained from the lung bases to the pubic symphysis by computed tomography. This study was performed with techniques to keep radiation doses as low as reasonably achievable, (ALARA). Individualized dose reduction techniques using automated exposure control or adjustment of mA and/or kV according to the patient size were employed.  FINDINGS:  ABDOMEN: The lung bases demonstrate dependent interstitial edema. A 3.6 cm partially calcified granuloma again identified posterior right lower lobe. The heart is proper size. The liver demonstrates diffuse fatty infiltration and is mildly enlarged at 16 cm. There are metallic surgical clips in the right upper quadrant consistent with previous cholecystectomy. The spleen again demonstrates a circumscribed, hypodense lesion laterally measuring 19 Hounsfield units consistent with a cyst and measuring 15 mm in the AP diameter. No diffuse no adrenal mass is present.  The pancreas is unremarkable. The kidneys are unremarkable, without evidence of mass or hydronephrosis. The aorta is proper caliber. There is no free fluid or adenopathy. Vascular calcifications noted.  PELVIS: The GI tract demonstrates air-fluid levels in nondistended small bowel, suspect ileus. There is a significant inflammatory process in the left lower quadrant with marked wall thickening of the mid sigmoid colon. There are multiple diverticula in the sigmoid colon. There is significant infiltration of the adjacent fat with a small amount of fluid in the distal left paracolic gutter as well as the right lower quadrant and minimal fluid in the left side of the pelvis; findings are most consistent with acute diverticulitis. Follow-up by CT or colonoscopy  to document resolution of this process to exclude underlying malignancy is recommended. Inflammatory process does indent the left dome of the bladder. No significant bladder wall thickening is identified. Uterus is midline. No free air identified. There is a small area of extraluminal air seen just lateral and posterior to the mid sigmoid colon measuring up to 15 mm in diameter suggesting contained perforation.. The appendix is identified and appears normal. The urinary bladder is unremarkable. There is no free fluid, adenopathy, or inflammatory process.      Impression: Acute mid sigmoid diverticulitis with a small amount of pelvic ascites and what appears to be a contained for perforation posteriorly and laterally; recommend follow-up to document resolution.  Bowel gas pattern suggesting ileus.  Enlarged, fatty liver.    CTDI: 10.02 mGy DLP:501.19 mGy.cm  This report was signed and finalized on 7/23/2025 5:06 PM by Lula Fitch MD.      I have reviewed the patient's radiology reports.    Medication Review:     I have reviewed the patient's active and prn medications.     Problem List:      Diverticulitis      Assessment/Plan:    Inpatient general floor admission 7/23/2025 with acute sigmoid diverticulitis with contained perforation with related sepsis.    Hypoxia  Stable on 2 L.  Possible elements of VALENTINO while sleeping, splinting from abdominal pain.  Acute sigmoid diverticulitis with contained perforation  Sepsis  Zosyn.  NPO.  Pain control.  IVF.  General Surgery consultation, recommendations appreciated.    Chronic: diverticulosis, endometriosis, IBS, GERD  Continue home medications when cleared for p.o.    DVT Prophylaxis: SCDs   Code Status: Full code  Diet: N.p.o.  Discharge Plan: Pending GS evaluation    Brian Joseph Kerley, DO  07/24/25  07:07 EDT    Dictated utilizing Dragon dictation.    I have reviewed the copied text and it is accurate as of 7/24/2025

## 2025-07-24 NOTE — CONSULTS
[facility]    1980    Primary Care Provider: Patricio Bass DO    Chief Complaint   Patient presents with    Abdominal Pain          SUBJECTIVE:    History of Present Illness: I was asked to see this patient today in consultation with the hospitalist service for evaluation and treatment of a several day history of increasing left lower quadrant abdominal discomfort.  Apparently she has had these attacks in the past, she last had diverticulitis several years ago.  She describes this pain as sharp in nature and worse with movement.    Review of Systems:  Constitutional:  Negative for chills, fever, and unexpected weight change.  HENT: Negative for trouble swallowing and voice change.  Eyes:  Negative for visual disturbance.  Respiratory:  Negative for apnea, cough, chest tightness, shortness of breath, and wheezing.  Cardiovascular:  Negative for chest pain, palpitations, and leg swelling.  Gastrointestinal:  Negative for abdominal distention, abdominal pain, anal bleeding, blood in stool, constipation, diarrhea, nausea, rectal pain, and vomiting.  There is a history significant for left lower quadrant abdominal discomfort  Musculoskeletal:  Negative for back pain, gait problem, and joint swelling.  Skin:  Negative for color change, rash, and wound  Neurological:  Negative for dizziness, syncope, speech difficulty, weakness, numbness, and headaches.  Hematological:  Negative for adenopathy.  Does not bruise/bleed easily.  Psychiatric/Behavioral:  Negative for confusion.  The patient is not nervous/anxious.        History:    Past Medical History:   Diagnosis Date    Abnormal Pap smear of cervix     Acid reflux     Anemia     Body piercing     EARS    Bronchitis 10/03/2022    has residual cough and nasal congestion (assessed 11/3/22)    Diverticulitis     Endometriosis     Heart murmur     REPORTS \"MURMUR AS A BABY\"    History of cervical dysplasia     History of gastroesophageal reflux (GERD)     " "HPV (human papilloma virus) infection     IBS (irritable bowel syndrome)     REPORTS PAST HISTORY, NO CURRENT ISSUES    Irritable bowel syndrome with diarrhea 06/03/2016    Migraine     Port-wine stain     Seasonal allergies     Varicella        Past Surgical History:   Procedure Laterality Date    ANKLE SURGERY Left     REPORTS LIGAMENT REPAIR, THEN LATER HAD STITCHES REMOVED (1 YEAR POST OP)    CERVICAL BIOPSY  W/ LOOP ELECTRODE EXCISION      CHOLECYSTECTOMY      COLONOSCOPY N/A 11/04/2022    Procedure: COLONOSCOPY WITH POLYPECTOMY X3;  Surgeon: Tico Manzano MD;  Location: [facility] ENDOSCOPY;  Service: Gastroenterology;  Laterality: N/A;    D & C HYSTEROSCOPY N/A 04/03/2023    Procedure: DILATATION AND CURETTAGE HYSTEROSCOPY;  Surgeon: Candy Knight MD;  Location: [facility] OR;  Service: Obstetrics/Gynecology;  Laterality: N/A;    DILATATION AND CURETTAGE      ENDOMETRIAL ABLATION      ENDOSCOPY      EXPLORATORY LAPAROTOMY      FOOT SURGERY      surgery to repair torn ligaments in left ankle in 2010/2011    GYNECOLOGIC CRYOSURGERY      HYSTEROSCOPY      HYSTEROSCOPY ENDOMETRIAL ABLATION      LAPAROSCOPIC CHOLECYSTECTOMY  2006    LEEP N/A 08/24/2018    Procedure: LOOP ELECTROCAUTERY EXCISION PROCEDURE;  Surgeon: Candy Knight MD;  Location: [facility] OR;  Service: Obstetrics/Gynecology    OTHER SURGICAL HISTORY      AFTER DELIVERY OF DAUGHTER REPORTS SURGICAL INTERVENTION FROM \"TEAR THAT WAS INTERNAL\"    SKIN BIOPSY      benign    TRIGGER POINT INJECTION         Family History   Problem Relation Age of Onset    Cancer Mother         Skin Cancer    Hyperlipidemia Mother     Melanoma Mother     Hyperlipidemia Father     Hypertension Father     Hyperlipidemia Brother     Hypertension Brother     Hyperlipidemia Maternal Grandmother     Heart attack Paternal Grandmother     Hyperlipidemia Paternal Grandmother     Thyroid disease Maternal Aunt     Hyperlipidemia Maternal Grandfather     Heart attack Paternal Grandfather  "    Hyperlipidemia Paternal Grandfather     Breast cancer Neg Hx        Social History     Socioeconomic History    Marital status:    Tobacco Use    Smoking status: Every Day     Current packs/day: 0.50     Average packs/day: 0.5 packs/day for 25.8 years (12.9 ttl pk-yrs)     Types: Cigarettes     Start date: 1/1/2000     Passive exposure: Current    Smokeless tobacco: Never   Vaping Use    Vaping status: Never Used   Substance and Sexual Activity    Alcohol use: No    Drug use: No    Sexual activity: Not Currently     Partners: Male     Birth control/protection: Birth control pill       Allergies:  Allergies   Allergen Reactions    Gadolinium Derivatives (Mr Contrast) Itching     Immediately upon receiving 17ml of multihance, patient experienced itching of the throat.  No other symptoms were present.    Levaquin [Levofloxacin] Hives    Sulfa Antibiotics Hives    Zithromax [Azithromycin] Diarrhea       Medications:    Current Facility-Administered Medications:     acetaminophen (TYLENOL) tablet 650 mg, 650 mg, Oral, Q4H PRN **OR** acetaminophen (TYLENOL) 160 MG/5ML oral solution 650 mg, 650 mg, Oral, Q4H PRN **OR** acetaminophen (TYLENOL) suppository 650 mg, 650 mg, Rectal, Q4H PRN, John Plummer MD    Magnesium Standard Dose Replacement - Follow Nurse / BPA Driven Protocol, , Not Applicable, PRN, John Plummer MD    Morphine sulfate (PF) injection 2 mg, 2 mg, Intravenous, Q2H PRN, 2 mg at 07/24/25 1216 **AND** naloxone (NARCAN) injection 0.4 mg, 0.4 mg, Intravenous, Q5 Min PRN, John Plummer MD    nebivolol (BYSTOLIC) tablet 10 mg, 10 mg, Oral, Daily, John Plummer MD    nitroglycerin (NITROSTAT) SL tablet 0.4 mg, 0.4 mg, Sublingual, Q5 Min PRN, John Plummer MD    ondansetron (ZOFRAN) injection 4 mg, 4 mg, Intravenous, Q6H PRN, John Plummer MD    pantoprazole (PROTONIX) EC tablet 40 mg, 40 mg, Oral, Q AM, John Plummer MD, 40 mg at 07/24/25 0616    Pharmacy To Dose:  Piperacillin-tazobactam (Zosyn), , Not Applicable, Continuous PRN, John Plummer MD    piperacillin-tazobactam (ZOSYN) IVPB 3.375 g IVPB in 100 mL NS (VTB), 3.375 g, Intravenous, Q8H, John Plummer MD, Stopped at 07/24/25 1200    Potassium Replacement - Follow Nurse / BPA Driven Protocol, , Not Applicable, PRN, John Plummer MD    sodium chloride 0.9 % flush 10 mL, 10 mL, Intravenous, PRN, John Plummer MD    sodium chloride 0.9 % flush 10 mL, 10 mL, Intravenous, Q12H, John Plummer MD, 10 mL at 07/24/25 0019    sodium chloride 0.9 % flush 10 mL, 10 mL, Intravenous, PRN, John Plummer MD    sodium chloride 0.9 % infusion 40 mL, 40 mL, Intravenous, PRN, John Plummer MD    sodium chloride 0.9 % infusion, 125 mL/hr, Intravenous, Continuous, John Plummer MD, Last Rate: 125 mL/hr at 07/23/25 1948, 125 mL/hr at 07/23/25 1948    OBJECTIVE:    Vital Signs:   Vitals:    07/23/25 2259 07/24/25 0311 07/24/25 0717 07/24/25 0752   BP: 119/70 125/75 127/71    BP Location: Left arm Left arm Left arm    Patient Position: Lying Lying Lying    Pulse: 75 74 70 83   Resp: 18 18 18    Temp: 98 °F (36.7 °C) 97.8 °F (36.6 °C)     TempSrc: Oral Oral     SpO2:  (!) 89%  90%   Weight:       Height:           Physical Exam:   General Appearance:    Alert, cooperative, in no acute distress   Head:    Normocephalic, without obvious abnormality, atraumatic   Eyes:            Lids and lashes normal, conjunctivae and sclerae normal, no   icterus, no pallor, corneas clear, PERRLA   Throat:   No oral lesions, no thrush, oral mucosa moist   Neck:   No adenopathy, supple, trachea midline, no thyromegaly, no   carotid bruit, no JVD   Lungs:     Clear to auscultation,respirations regular, even and unlabored    Heart:    Regular rhythm and normal rate, normal S1 and S2, no murmur, no gallop, no rub, no click   Chest Wall:    No abnormalities observed   Abdomen:     Normal bowel sounds, no masses, no organomegaly, soft, tender in  the left lower quadrant to deep palpation, nondistended, no guarding, no rebound                tenderness   Extremities:   Moves all extremities well, no edema, no cyanosis, no redness   Pulses:   Pulses palpable and equal bilaterally   Skin:   No bleeding, bruising or rash   Lymph nodes:   No palpable adenopathy   Neurologic:   Cranial nerves 2 - 12 grossly intact, sensation intact, DTR present and equal bilaterally   Results Review:    Lab Results (last 24 hours)       Procedure Component Value Units Date/Time    Basic Metabolic Panel [[id number]]  (Abnormal) Collected: 07/24/25 0519    Specimen: Blood Updated: 07/24/25 0612     Glucose 127 mg/dL      BUN 11.0 mg/dL      Creatinine 0.57 mg/dL      Sodium 137 mmol/L      Potassium 4.3 mmol/L      Chloride 104 mmol/L      CO2 22.0 mmol/L      Calcium 9.9 mg/dL      BUN/Creatinine Ratio 19.3     Anion Gap 11.0 mmol/L      eGFR 115.1 mL/min/1.73     Narrative:      GFR Categories in Chronic Kidney Disease (CKD)              GFR Category          GFR (mL/min/1.73)    Interpretation  G1                    90 or greater        Normal or high (1)  G2                    60-89                Mild decrease (1)  G3a                   45-59                Mild to moderate decrease  G3b                   30-44                Moderate to severe decrease  G4                    15-29                Severe decrease  G5                    14 or less           Kidney failure    (1)In the absence of evidence of kidney disease, neither GFR category G1 or G2 fulfill the criteria for CKD.    eGFR calculation 2021 CKD-EPI creatinine equation, which does not include race as a factor    CBC (No Diff) [[id number]]  (Abnormal) Collected: 07/24/25 0519    Specimen: Blood Updated: 07/24/25 0550     WBC 13.31 10*3/mm3      RBC 4.67 10*6/mm3      Hemoglobin 13.1 g/dL      Hematocrit 40.7 %      MCV 87.2 fL      MCH 28.1 pg      MCHC 32.2 g/dL      RDW 13.9 %      RDW-SD 44.7 fl      MPV 10.0 fL       Platelets 315 10*3/mm3     Pregnancy, Urine - Urine, Clean Catch [[id number]]  (Normal) Collected: 07/23/25 1359    Specimen: Urine, Clean Catch Updated: 07/23/25 1944     HCG, Urine QL Negative    Lactic Acid, Plasma [[id number]]  (Normal) Collected: 07/23/25 1849    Specimen: Blood Updated: 07/23/25 1932     Lactate 0.9 mmol/L               I reviewed the patient's new clinical results.  I reviewed the patient's new imaging results and agree with the interpretation.  I reviewed the patient's other test results and agree with the interpretation    ASSESSMENT PLAN:      Diverticulitis      I did have a detailed and extensive discussion with the patient in the office today along with her father.  This is at least her second attack of diverticulitis in the last several years, she needs to undergo admission, IV fluids for rehydration, IV antibiotics, and she can be started on a clear liquid diet.  Clinically she is better, I would like to keep her again today and then hopefully she can be discharged tomorrow and then I want to see her back in the office next week.  She will need most likely elective outpatient colonoscopy and then I have already discussed the possibilities with her of future sigmoid colectomy in a robotic assisted laparoscopic manner due to her young age and the fact that she has had multiple attacks of diverticulitis.    I discussed the patient's findings and my recommendations with patient, family, and nursing staff.    I did review the patient's chart, reviewed her old records, I looked at the CT scan and according my interpretation there is evidence of sigmoid diverticulitis with inflammatory changes.    Edwin Oakley MD  07/24/25  15:55 EDT

## 2025-07-25 ENCOUNTER — READMISSION MANAGEMENT (OUTPATIENT)
Dept: CALL CENTER | Facility: HOSPITAL | Age: 45
End: 2025-07-25
Payer: COMMERCIAL

## 2025-07-25 VITALS
OXYGEN SATURATION: 96 % | RESPIRATION RATE: 18 BRPM | SYSTOLIC BLOOD PRESSURE: 131 MMHG | TEMPERATURE: 98.2 F | HEIGHT: 66 IN | WEIGHT: 193 LBS | HEART RATE: 69 BPM | BODY MASS INDEX: 31.02 KG/M2 | DIASTOLIC BLOOD PRESSURE: 67 MMHG

## 2025-07-25 PROBLEM — R31.9 HEMATURIA: Status: ACTIVE | Noted: 2025-07-25

## 2025-07-25 LAB
ALBUMIN SERPL-MCNC: 3.1 G/DL (ref 3.5–5.2)
ALBUMIN/GLOB SERPL: 1.2 G/DL
ALP SERPL-CCNC: 75 U/L (ref 39–117)
ALT SERPL W P-5'-P-CCNC: 18 U/L (ref 1–33)
ANION GAP SERPL CALCULATED.3IONS-SCNC: 9.9 MMOL/L (ref 5–15)
AST SERPL-CCNC: 13 U/L (ref 1–32)
BILIRUB SERPL-MCNC: 0.3 MG/DL (ref 0–1.2)
BUN SERPL-MCNC: 9 MG/DL (ref 6–20)
BUN/CREAT SERPL: 15 (ref 7–25)
CALCIUM SPEC-SCNC: 8.7 MG/DL (ref 8.6–10.5)
CHLORIDE SERPL-SCNC: 109 MMOL/L (ref 98–107)
CO2 SERPL-SCNC: 20.1 MMOL/L (ref 22–29)
CREAT SERPL-MCNC: 0.6 MG/DL (ref 0.57–1)
DEPRECATED RDW RBC AUTO: 45 FL (ref 37–54)
EGFRCR SERPLBLD CKD-EPI 2021: 113.7 ML/MIN/1.73
EOSINOPHIL # BLD MANUAL: 0.27 10*3/MM3 (ref 0–0.4)
EOSINOPHIL NFR BLD MANUAL: 3 % (ref 0.3–6.2)
ERYTHROCYTE [DISTWIDTH] IN BLOOD BY AUTOMATED COUNT: 14.1 % (ref 12.3–15.4)
GLOBULIN UR ELPH-MCNC: 2.5 GM/DL
GLUCOSE SERPL-MCNC: 78 MG/DL (ref 65–99)
HCT VFR BLD AUTO: 35.5 % (ref 34–46.6)
HGB BLD-MCNC: 11.6 G/DL (ref 12–15.9)
LYMPHOCYTES # BLD MANUAL: 3.19 10*3/MM3 (ref 0.7–3.1)
LYMPHOCYTES NFR BLD MANUAL: 2 % (ref 5–12)
MCH RBC QN AUTO: 28.4 PG (ref 26.6–33)
MCHC RBC AUTO-ENTMCNC: 32.7 G/DL (ref 31.5–35.7)
MCV RBC AUTO: 86.8 FL (ref 79–97)
MONOCYTES # BLD: 0.18 10*3/MM3 (ref 0.1–0.9)
NEUTROPHILS # BLD AUTO: 5.47 10*3/MM3 (ref 1.7–7)
NEUTROPHILS NFR BLD MANUAL: 60 % (ref 42.7–76)
PLAT MORPH BLD: NORMAL
PLATELET # BLD AUTO: 269 10*3/MM3 (ref 140–450)
PMV BLD AUTO: 10.6 FL (ref 6–12)
POTASSIUM SERPL-SCNC: 3.7 MMOL/L (ref 3.5–5.2)
PROT SERPL-MCNC: 5.6 G/DL (ref 6–8.5)
RBC # BLD AUTO: 4.09 10*6/MM3 (ref 3.77–5.28)
RBC MORPH BLD: NORMAL
SCAN SLIDE: NORMAL
SODIUM SERPL-SCNC: 139 MMOL/L (ref 136–145)
VARIANT LYMPHS NFR BLD MANUAL: 1 % (ref 0–5)
VARIANT LYMPHS NFR BLD MANUAL: 34 % (ref 19.6–45.3)
WBC MORPH BLD: NORMAL
WBC NRBC COR # BLD AUTO: 9.11 10*3/MM3 (ref 3.4–10.8)

## 2025-07-25 PROCEDURE — 25010000002 METRONIDAZOLE 500 MG/100ML SOLUTION: Performed by: STUDENT IN AN ORGANIZED HEALTH CARE EDUCATION/TRAINING PROGRAM

## 2025-07-25 PROCEDURE — 99239 HOSP IP/OBS DSCHRG MGMT >30: CPT | Performed by: STUDENT IN AN ORGANIZED HEALTH CARE EDUCATION/TRAINING PROGRAM

## 2025-07-25 PROCEDURE — G0378 HOSPITAL OBSERVATION PER HR: HCPCS

## 2025-07-25 PROCEDURE — 80053 COMPREHEN METABOLIC PANEL: CPT | Performed by: STUDENT IN AN ORGANIZED HEALTH CARE EDUCATION/TRAINING PROGRAM

## 2025-07-25 PROCEDURE — 85007 BL SMEAR W/DIFF WBC COUNT: CPT | Performed by: STUDENT IN AN ORGANIZED HEALTH CARE EDUCATION/TRAINING PROGRAM

## 2025-07-25 PROCEDURE — 25810000003 SODIUM CHLORIDE 0.9 % SOLUTION: Performed by: FAMILY MEDICINE

## 2025-07-25 PROCEDURE — 85025 COMPLETE CBC W/AUTO DIFF WBC: CPT | Performed by: STUDENT IN AN ORGANIZED HEALTH CARE EDUCATION/TRAINING PROGRAM

## 2025-07-25 RX ORDER — HYDROCODONE BITARTRATE AND ACETAMINOPHEN 5; 325 MG/1; MG/1
1 TABLET ORAL EVERY 6 HOURS PRN
Qty: 8 TABLET | Refills: 0 | Status: SHIPPED | OUTPATIENT
Start: 2025-07-25

## 2025-07-25 RX ADMIN — Medication 10 ML: at 08:45

## 2025-07-25 RX ADMIN — SODIUM CHLORIDE 125 ML/HR: 9 INJECTION, SOLUTION INTRAVENOUS at 00:35

## 2025-07-25 RX ADMIN — METRONIDAZOLE 500 MG: 500 INJECTION, SOLUTION INTRAVENOUS at 00:30

## 2025-07-25 RX ADMIN — PANTOPRAZOLE SODIUM 40 MG: 40 TABLET, DELAYED RELEASE ORAL at 06:13

## 2025-07-25 RX ADMIN — NEBIBOLOL 10 MG: 10 TABLET ORAL at 08:45

## 2025-07-25 RX ADMIN — METRONIDAZOLE 500 MG: 500 INJECTION, SOLUTION INTRAVENOUS at 08:45

## 2025-07-25 NOTE — OUTREACH NOTE
Prep Survey      Flowsheet Row Responses   [facility] patient discharged from? [place]   Is LACE score < 7 ? Yes   Eligibility [facility]   Date of Admission 07/23/25   Date of Discharge 07/25/25   Discharge Disposition Home or Self Care   Discharge diagnosis Abdominal painDiverticulitis of colon with perforation   Does the patient have one of the following disease processes/diagnoses(primary or secondary)? Other   Does the patient have Home health ordered? No   Is there a DME ordered? No   Prep survey completed? Yes            SHELLIE RICHARDSON - Registered Nurse

## 2025-07-25 NOTE — CASE MANAGEMENT/SOCIAL WORK
Case Management Discharge Note      Final Note: Patient is discharging today to home via private vehicle. Food bag provided. No needs noted.    Provided Post Acute Provider List?: N/A  N/A Provider List Comment: No needs identified at this time    Selected Continued Care - Discharged on 7/25/2025 Admission date: 7/23/2025 - Discharge disposition: Home or Self Care      Destination    No services have been selected for the patient.                Durable Medical Equipment    No services have been selected for the patient.                Dialysis/Infusion    No services have been selected for the patient.                Home Medical Care    No services have been selected for the patient.                Therapy    No services have been selected for the patient.                Community Resources Coordination complete.      Service Provider Services Address Phone Fax Patient Preferred    [facility] [address] [place] [phone] -- --              Community & DME    No services have been selected for the patient.                    Transportation Services  Transportation: Private Transportation  Private: Car    Final Discharge Disposition Code: 01 - home or self-care

## 2025-07-25 NOTE — DISCHARGE SUMMARY
[facility]   DISCHARGE SUMMARY      Name:  Vielka Jennings   Age:  45 y.o.  Sex:  female  :  1980  MRN:  [id number]   Visit Number:  [id number]    Admission Date:  2025  Date of Discharge:  2025  Primary Care Physician:  Patricio Bass DO      Problem List:     Active Hospital Problems    Diagnosis  POA    **Diverticulitis [K57.92]  Yes    Hematuria [R31.9]  Yes      Resolved Hospital Problems   No resolved problems to display.     Presenting Problem:    Chief Complaint   Patient presents with    Abdominal Pain      Consults:     Consulting Physician(s)         Provider   Role Specialty     Edwin Oakley MD      Consulting Physician General Surgery            History of presenting illness/Hospital Course:    Vielka Jennings is a 45 years old female with a past medical history of diverticulosis, endometriosis, IBS, GERD, who presented to the ER with a chief complaint of abdominal pain.  Patient reporting left lower quadrant squeezing severe abdominal pain that started 3 days ago, she had associated nausea but no vomiting.  No fever, chills or changes in bowel movements.  She went to an urgent care and was prescribed Augmentin couple days prior to presentation, she went to her PCP again day of presentation, she took the antibiotics but her symptoms did not improve.  Patient presented to the ER due to persistent and worsening abdominal pain.  Reporting history of diverticulosis.     On ER evaluation, vitals were stable and afebrile. Workup in the ER was significant for WBC of 17.5 and sodium of 133.  Otherwise CBC and CMP nonactionable.  CT abdomen pelvis showed Acute mid sigmoid diverticulitis with a small amount of pelvic ascites and what appears to be a contained for perforation posteriorly and laterally; recommend follow-up to document resolution. Bowel gas pattern suggesting ileus. Enlarged, fatty liver.  Case was discussed with general surgery Dr. Oakley  by ER provider, recommended n.p.o., IV antibiotics and admission to the hospital and he will consult.  Patient received 1 L bolus of normal saline, Solu-Medrol 125 mg, morphine, Zofran, Benadryl and was started on Zosyn while in the ER.  Hospitalist consulted for admission, further management and treatment.    Inpatient general floor admission 7/23/2025 with acute sigmoid diverticulitis with contained perforation with related sepsis.  Developed flushed face and into neck, unsure if related to Zosyn.  At the time Zosyn discontinued and switched to Rocephin and Flagyl.  Clinically improved overall during course.    Able for discharge per general surgery 7/25/2025.     Nocturnal hypoxia  Recommend outpatient sleep study.  Stable on 2 L when needed.  Possible elements of VALENTINO while sleeping, splinting from abdominal pain.  Acute sigmoid diverticulitis with contained perforation  Sepsis, resolved  Follow-up outpatient with general surgery.  Continue Augmentin at discharge.  Developed flushed face and into neck, unsure if related to Zosyn.  At the time Zosyn discontinued and switched to Rocephin and Flagyl  General Surgery consultation during course, recommendations appreciated.  Hematuria  Follow-up outpatient with urology.  Said that she saw some blood in her urine before hospitalization.  Denied history of kidney stones.     Chronic: diverticulosis, endometriosis, IBS, GERD  Continue home medications when cleared for p.o.    Vital Signs:    Temp:  [98 °F (36.7 °C)-98.2 °F (36.8 °C)] 98.2 °F (36.8 °C)  Heart Rate:  [69-84] 69  Resp:  [18] 18  BP: (103-131)/(60-73) 131/67    Physical Exam:    General Appearance:  Alert and cooperative.    Head:  Atraumatic and normocephalic.   Eyes: Conjunctivae and sclerae normal, no icterus. No pallor.   Throat: No oral lesions, no thrush, oral mucosa moist.   Neck: Supple, trachea midline, no thyromegaly.   Lungs:   Breath sounds heard bilaterally equally.  No wheezing or crackles. No  Pleural rub or bronchial breathing.   Heart:  Normal S1 and S2, no murmur, no gallop, no rub. No JVD.   Abdomen:   Normal bowel sounds, no masses, no organomegaly. Soft, left lower quadrant tenderness, nondistended, no rebound tenderness.   Extremities: Supple, no edema, no cyanosis, no clubbing.   Skin:  warm.   Neurologic: Alert and oriented x 3. No facial asymmetry. Moves all four limbs. No tremors.      Pertinent Lab Results:     Results from last 7 days   Lab Units 07/25/25  0641 07/24/25  0519 07/23/25  1319   SODIUM mmol/L 139 137 133*   POTASSIUM mmol/L 3.7 4.3 4.0   CHLORIDE mmol/L 109* 104 97*   CO2 mmol/L 20.1* 22.0 23.4   BUN mg/dL 9.0 11.0 8.0   CREATININE mg/dL 0.60 0.57 0.68   CALCIUM mg/dL 8.7 9.9 10.0   BILIRUBIN mg/dL 0.3  --  1.2   ALK PHOS U/L 75  --  109   ALT (SGPT) U/L 18  --  26   AST (SGOT) U/L 13  --  18   GLUCOSE mg/dL 78 127* 102*     Results from last 7 days   Lab Units 07/25/25  0641 07/24/25  0519 07/23/25  1319   WBC 10*3/mm3 9.11 13.31* 17.58*   HEMOGLOBIN g/dL 11.6* 13.1 15.0   HEMATOCRIT % 35.5 40.7 45.0   PLATELETS 10*3/mm3 269 315 350                     Results from last 7 days   Lab Units 07/23/25  1319   LIPASE U/L 19               Pertinent Radiology Results:    Imaging Results (All)       Procedure Component Value Units Date/Time    CT Abdomen Pelvis With Contrast [[id number]] Collected: 07/23/25 1615     Updated: 07/23/25 1710    Narrative:      PROCEDURE: CT ABDOMEN PELVIS W CONTRAST-     HISTORY: LLQ abdominal pain.     COMPARISON: August 31, 2022..     PROCEDURE: The patient was injected with IV contrast. Axial images were  obtained from the lung bases to the pubic symphysis by computed  tomography. This study was performed with techniques to keep radiation  doses as low as reasonably achievable, (ALARA). Individualized dose  reduction techniques using automated exposure control or adjustment of  mA and/or kV according to the patient size were employed.     FINDINGS:      ABDOMEN: The lung bases demonstrate dependent interstitial edema. A 3.6  cm partially calcified granuloma again identified posterior right lower  lobe. The heart is proper size. The liver demonstrates diffuse fatty  infiltration and is mildly enlarged at 16 cm. There are metallic  surgical clips in the right upper quadrant consistent with previous  cholecystectomy. The spleen again demonstrates a circumscribed,  hypodense lesion laterally measuring 19 Hounsfield units consistent with  a cyst and measuring 15 mm in the AP diameter. No diffuse no adrenal  mass is present.  The pancreas is unremarkable. The kidneys are  unremarkable, without evidence of mass or hydronephrosis. The aorta is  proper caliber. There is no free fluid or adenopathy. Vascular  calcifications noted.     PELVIS: The GI tract demonstrates air-fluid levels in nondistended small  bowel, suspect ileus. There is a significant inflammatory process in the  left lower quadrant with marked wall thickening of the mid sigmoid  colon. There are multiple diverticula in the sigmoid colon. There is  significant infiltration of the adjacent fat with a small amount of  fluid in the distal left paracolic gutter as well as the right lower  quadrant and minimal fluid in the left side of the pelvis; findings are  most consistent with acute diverticulitis. Follow-up by CT or  colonoscopy to document resolution of this process to exclude underlying  malignancy is recommended. Inflammatory process does indent the left  dome of the bladder. No significant bladder wall thickening is  identified. Uterus is midline. No free air identified. There is a small  area of extraluminal air seen just lateral and posterior to the mid  sigmoid colon measuring up to 15 mm in diameter suggesting contained  perforation.. The appendix is identified and appears normal. The urinary  bladder is unremarkable. There is no free fluid, adenopathy, or  inflammatory process.       Impression:       Acute mid sigmoid diverticulitis with a small amount of  pelvic ascites and what appears to be a contained for perforation  posteriorly and laterally; recommend follow-up to document resolution.     Bowel gas pattern suggesting ileus.     Enlarged, fatty liver.           CTDI: 10.02 mGy  DLP:501.19 mGy.cm     This report was signed and finalized on 7/23/2025 5:06 PM by Lula Fitch MD.               Echo:      Condition on Discharge:      Stable.    Code status during the hospital stay:    Code Status and Medical Interventions: CPR (Attempt to Resuscitate); Full Support   Ordered at: 07/23/25 1937     Code Status (Patient has no pulse and is not breathing):    CPR (Attempt to Resuscitate)     Medical Interventions (Patient has pulse or is breathing):    Full Support     Discharge Disposition:    Home or Self Care    Discharge Medications:       Discharge Medications        New Medications        Instructions Start Date   amoxicillin-clavulanate 875-125 MG per tablet  Commonly known as: AUGMENTIN   1 tablet, Oral, 2 Times Daily      HYDROcodone-acetaminophen 5-325 MG per tablet  Commonly known as: NORCO   1 tablet, Oral, Every 6 Hours PRN             Continue These Medications        Instructions Start Date   dexlansoprazole 30 MG capsule  Commonly known as: Dexilant   30 mg, Oral, Daily      nebivolol 10 MG tablet  Commonly known as: BYSTOLIC   10 mg, Oral, Daily      norethindrone 0.35 MG tablet  Commonly known as: MICRONOR   0.35 mg, Oral, Daily             Discharge Diet:     Diet Instructions       Advance Diet As Tolerated -Target Diet: cardiac      Target Diet: cardiac          Activity at Discharge:     Activity Instructions       Activity as Tolerated            Follow-up Appointments:     Follow-up Information       Dean Ramon MD Follow up.    Specialty: Urology  Why: hematuria  Contact information:  [address] [address] [phone]               Edwin Oakley,  MD Follow up.    Specialty: General Surgery  Why: next wednesday per Dr. Oakley  Contact information:  [address] [facility]  [address]  [place] [address]  [phone]               Patricio Bass, DO .    Specialty: Family Medicine  Contact information:  [address] [place] [id number] [phone]                           Future Appointments   Date Time Provider Department Center   8/15/2025  9:10 AM Candy Knight MD [facility] [place] [facility]     Test Results Pending at Discharge:    Pending Results       None                 Brian Joseph Kerley, DO  07/25/25  11:28 EDT    Time: I spent 35 minutes on this discharge activity which included: face-to-face encounter with the patient, reviewing the data in the system, coordination of the care with the nursing staff as well as consultants, documentation, and entering orders.     Dictated utilizing Dragon dictation.    I have reviewed the copied text and it is accurate as of 7/25/2025

## 2025-07-25 NOTE — PLAN OF CARE
Goal Outcome Evaluation:  Plan of Care Reviewed With: patient        Progress: improving  Outcome Evaluation: VSS, no acute episodes overnight, only one complaint of pain throughout shift, will continue to follow plan of care

## 2025-07-28 ENCOUNTER — TRANSITIONAL CARE MANAGEMENT TELEPHONE ENCOUNTER (OUTPATIENT)
Dept: CALL CENTER | Facility: HOSPITAL | Age: 45
End: 2025-07-28
Payer: COMMERCIAL

## 2025-07-28 NOTE — PROGRESS NOTES
Patient: Vielka Jennings    YOB: 1980    Date: 07/30/2025    Primary Care Provider: Patricio Bass DO    Chief Complaint   Patient presents with    Hospital Follow Up Visit     Diverticulitis        SUBJECTIVE:    History of present illness:  Patient is here for evaluation of recent diagnosis of diverticulitis.  Patient presented to [facility] emergency department 07/23/2025 for evaluation of abdominal pain.  Patient had CT scan of her abdomen and pelvis performed at that time which showed acute sigmoid diverticulitis with a small amount of pelvic ascites and what appeared to be a contained for perforation, bowel gas pattern suggesting ileus and an enlarged fatty liver. Patient was admitted and she was discharged 07/25/2025.    It is interesting that she has had a previous attack of diverticulitis about 3 years ago in which she was treated as an outpatient.  She also has had a recent history of blood in her urine and a questionable history of passing pneumaturia.  She does not give a history significant for chronic urinary tract infections, at the time of hospitalization there was indentation of the bladder from her inflamed sigmoid colon.        The following portions of the patient's history were reviewed and updated as appropriate: allergies, current medications, past family history, past medical history, past social history, past surgical history and problem list.      Review of Systems   Constitutional:  Negative for chills, fever and unexpected weight change.   HENT:  Negative for hearing loss, trouble swallowing and voice change.    Eyes:  Negative for visual disturbance.   Respiratory:  Negative for apnea, cough, chest tightness, shortness of breath and wheezing.    Cardiovascular:  Negative for chest pain, palpitations and leg swelling.   Gastrointestinal:  Negative for abdominal distention, abdominal pain, anal bleeding, blood in stool, constipation, diarrhea, nausea,  rectal pain and vomiting.   Endocrine: Negative for cold intolerance and heat intolerance.   Genitourinary:  Negative for difficulty urinating, dysuria and flank pain.   Musculoskeletal:  Negative for back pain and gait problem.   Skin:  Negative for color change, rash and wound.   Neurological:  Negative for dizziness, syncope, speech difficulty, weakness, light-headedness, numbness and headaches.   Hematological:  Negative for adenopathy. Does not bruise/bleed easily.   Psychiatric/Behavioral:  Negative for confusion. The patient is not nervous/anxious.        Allergies:  Allergies   Allergen Reactions    Gadolinium Derivatives (Mr Contrast) Itching     Immediately upon receiving 17ml of multihance, patient experienced itching of the throat.  No other symptoms were present.    Levaquin [Levofloxacin] Hives    Sulfa Antibiotics Hives    Zithromax [Azithromycin] Diarrhea       Medications:    Current Outpatient Medications:     amoxicillin-clavulanate (AUGMENTIN) 875-125 MG per tablet, Take 1 tablet by mouth 2 (Two) Times a Day for 5 days., Disp: 10 tablet, Rfl: 0    dexlansoprazole (Dexilant) 30 MG capsule, Take 1 capsule by mouth Daily., Disp: 90 capsule, Rfl: 3    nebivolol (BYSTOLIC) 10 MG tablet, Take 1 tablet by mouth Daily., Disp: 90 tablet, Rfl: 3    norethindrone (MICRONOR) 0.35 MG tablet, Take 1 tablet by mouth Daily., Disp: 28 tablet, Rfl: 3    ondansetron (ZOFRAN) 4 MG tablet, Take 1 tablet by mouth Every 8 (Eight) Hours As Needed. for nausea, Disp: , Rfl:     HYDROcodone-acetaminophen (NORCO) 5-325 MG per tablet, Take 1 tablet by mouth Every 6 (Six) Hours As Needed for Severe Pain. (Patient not taking: Reported on 7/30/2025), Disp: 8 tablet, Rfl: 0    History:  Past Medical History:   Diagnosis Date    Abnormal Pap smear of cervix     Acid reflux     Anemia     Body piercing     EARS    Bronchitis 10/03/2022    has residual cough and nasal congestion (assessed 11/3/22)    Diverticulitis      "Endometriosis     Heart murmur     REPORTS \"MURMUR AS A BABY\"    History of cervical dysplasia     History of gastroesophageal reflux (GERD)     HPV (human papilloma virus) infection     Hypertension     IBS (irritable bowel syndrome)     REPORTS PAST HISTORY, NO CURRENT ISSUES    Irritable bowel syndrome with diarrhea 06/03/2016    Migraine     Port-wine stain     Seasonal allergies     Varicella        Past Surgical History:   Procedure Laterality Date    ANKLE SURGERY Left     REPORTS LIGAMENT REPAIR, THEN LATER HAD STITCHES REMOVED (1 YEAR POST OP)    CERVICAL BIOPSY  W/ LOOP ELECTRODE EXCISION      CHOLECYSTECTOMY      COLONOSCOPY N/A 11/04/2022    Procedure: COLONOSCOPY WITH POLYPECTOMY X3;  Surgeon: Tico Manzano MD;  Location: [facility] ENDOSCOPY;  Service: Gastroenterology;  Laterality: N/A;    D & C HYSTEROSCOPY N/A 04/03/2023    Procedure: DILATATION AND CURETTAGE HYSTEROSCOPY;  Surgeon: Candy Knight MD;  Location: [facility] OR;  Service: Obstetrics/Gynecology;  Laterality: N/A;    DILATATION AND CURETTAGE      ENDOMETRIAL ABLATION      ENDOSCOPY      EXPLORATORY LAPAROTOMY      FOOT SURGERY      surgery to repair torn ligaments in left ankle in 2010/2011    GYNECOLOGIC CRYOSURGERY      HYSTEROSCOPY      HYSTEROSCOPY ENDOMETRIAL ABLATION      LAPAROSCOPIC CHOLECYSTECTOMY  2006    LEEP N/A 08/24/2018    Procedure: LOOP ELECTROCAUTERY EXCISION PROCEDURE;  Surgeon: Candy Knight MD;  Location: [facility] OR;  Service: Obstetrics/Gynecology    OTHER SURGICAL HISTORY      AFTER DELIVERY OF DAUGHTER REPORTS SURGICAL INTERVENTION FROM \"TEAR THAT WAS INTERNAL\"    SKIN BIOPSY      benign    TRIGGER POINT INJECTION         Family History   Problem Relation Age of Onset    Cancer Mother         Skin Cancer    Hyperlipidemia Mother     Melanoma Mother     Hyperlipidemia Father     Hypertension Father     Hyperlipidemia Brother     Hypertension Brother     Hyperlipidemia Maternal Grandmother     Heart attack Paternal " "Grandmother     Hyperlipidemia Paternal Grandmother     Thyroid disease Maternal Aunt     Hyperlipidemia Maternal Grandfather     Heart attack Paternal Grandfather     Hyperlipidemia Paternal Grandfather     Breast cancer Neg Hx        Social History     Tobacco Use    Smoking status: Every Day     Current packs/day: 0.50     Average packs/day: 0.5 packs/day for 25.8 years (12.9 ttl pk-yrs)     Types: Cigarettes     Start date: 1/1/2000     Passive exposure: Current    Smokeless tobacco: Never   Vaping Use    Vaping status: Never Used   Substance Use Topics    Alcohol use: No    Drug use: No        OBJECTIVE:    Vital Signs:   Vitals:    07/30/25 1347   BP: 146/90   Pulse: 89   Temp: 97.3 °F (36.3 °C)   TempSrc: Infrared   SpO2: 97%   Weight: 86.6 kg (191 lb)   Height: 167.6 cm (65.98\")     Physical Exam:     General Appearance:    Alert, cooperative, in no acute distress   Head:    Normocephalic, without obvious abnormality, atraumatic   Eyes:            Lids and lashes normal, conjunctivae and sclerae normal, no   icterus, no pallor, corneas clear, PERRLA   Ears:    Ears appear intact with no abnormalities noted   Throat:   No oral lesions, no thrush, oral mucosa moist   Neck:   No adenopathy, supple, trachea midline, no thyromegaly, no   carotid bruit, no JVD   Back:     No kyphosis present, no scoliosis present, no skin lesions,      erythema or scars, no tenderness to percussion or                   palpation,   range of motion normal   Lungs:     Clear to auscultation,respirations regular, even and                  unlabored    Heart:    Regular rhythm and normal rate, normal S1 and S2, no            murmur, no gallop, no rub, no click   Chest Wall:    No abnormalities observed   Abdomen:     Normal bowel sounds, no masses, no organomegaly, soft         slightly tender in the left lower quadrant, non-distended, no guarding,    Extremities:   Moves all extremities well, no edema, no cyanosis, no             " redness   Pulses:   Pulses palpable and equal bilaterally   Skin:   No bleeding, bruising or rash   Lymph nodes:   No palpable adenopathy   Neurologic:   Cranial nerves 2 - 12 grossly intact, sensation intact, DTR       present and equal bilaterally         Results Review:   I reviewed the patient's new clinical results.    Review of Systems was reviewed and confirmed as accurate as documented by the MA.    ASSESSMENT/PLAN:    1. Diverticulitis        I did have a detailed extensive and complete discussion with the patient in the office today.  She does have diverticulitis that is fairly complicated, I would like to get repeat laboratory values and a repeat CT scan of the abdomen and pelvis and then see her back in the office next Monday.  I have asked her to take another 4 days of oral antibiotics and she does have these at home, she most likely will need to get eventual colonoscopy but also think the patient is going to require future sigmoid resection in a robotic assisted laparoscopic fashion.    I discussed the patients findings and my recommendations with patient        Electronically signed by Edwin Oakley MD  07/30/25

## 2025-07-28 NOTE — OUTREACH NOTE
Call Center TCM Note      Flowsheet Row Responses   [facility] patient discharged from? [facility]   Does the patient have one of the following disease processes/diagnoses(primary or secondary)? Other   TCM attempt successful? Yes  [no VR]   Call start time 1105   Call end time 1113   Discharge diagnosis Abdominal painDiverticulitis of colon with perforation   Person spoke with today (if not patient) and relationship patient   Meds reviewed with patient/caregiver? Yes   Is the patient having any side effects they believe may be caused by any medication additions or changes? No   Does the patient have all medications ordered at discharge? Yes   Is the patient taking all medications as directed (includes completed medication regime)? Yes   Comments Hosp f/u scheduled for 08/05/2025.  TCM complete.   Does the patient have an appointment with their PCP within 7-14 days of discharge? Yes   Psychosocial issues? No   Did the patient receive a copy of their discharge instructions? Yes   Nursing interventions Reviewed instructions with patient   What is the patient's perception of their health status since discharge? Improving   If the patient is a current smoker, are they able to teach back resources for cessation? Not a smoker   TCM call completed? Yes   Wrap up additional comments Pt states she is passing a lot of blood.  She was advised to return to the ED now.  She states she will think about it.   Call end time 1113   Would this patient benefit from a Referral to Amb Social Work? No   Is the patient interested in additional calls from an ambulatory ? No            ILEANA CRISTINA - Registered Nurse    7/28/2025, 11:14 EDT

## 2025-07-30 ENCOUNTER — LAB (OUTPATIENT)
Dept: LAB | Facility: HOSPITAL | Age: 45
End: 2025-07-30
Payer: COMMERCIAL

## 2025-07-30 ENCOUNTER — OFFICE VISIT (OUTPATIENT)
Dept: SURGERY | Facility: CLINIC | Age: 45
End: 2025-07-30
Payer: COMMERCIAL

## 2025-07-30 VITALS
OXYGEN SATURATION: 97 % | HEART RATE: 89 BPM | HEIGHT: 66 IN | TEMPERATURE: 97.3 F | BODY MASS INDEX: 30.7 KG/M2 | SYSTOLIC BLOOD PRESSURE: 146 MMHG | DIASTOLIC BLOOD PRESSURE: 90 MMHG | WEIGHT: 191 LBS

## 2025-07-30 DIAGNOSIS — K57.92 DIVERTICULITIS: ICD-10-CM

## 2025-07-30 DIAGNOSIS — K57.92 DIVERTICULITIS: Primary | ICD-10-CM

## 2025-07-30 LAB
ALBUMIN SERPL-MCNC: 3.7 G/DL (ref 3.5–5.2)
ALBUMIN/GLOB SERPL: 1.2 G/DL
ALP SERPL-CCNC: 81 U/L (ref 39–117)
ALT SERPL W P-5'-P-CCNC: 26 U/L (ref 1–33)
ANION GAP SERPL CALCULATED.3IONS-SCNC: 8.9 MMOL/L (ref 5–15)
AST SERPL-CCNC: 25 U/L (ref 1–32)
BASOPHILS # BLD AUTO: 0.04 10*3/MM3 (ref 0–0.2)
BASOPHILS NFR BLD AUTO: 0.4 % (ref 0–1.5)
BILIRUB SERPL-MCNC: <0.2 MG/DL (ref 0–1.2)
BUN SERPL-MCNC: 9 MG/DL (ref 6–20)
BUN/CREAT SERPL: 12.3 (ref 7–25)
CALCIUM SPEC-SCNC: 9.4 MG/DL (ref 8.6–10.5)
CHLORIDE SERPL-SCNC: 106 MMOL/L (ref 98–107)
CO2 SERPL-SCNC: 23.1 MMOL/L (ref 22–29)
CREAT SERPL-MCNC: 0.73 MG/DL (ref 0.57–1)
DEPRECATED RDW RBC AUTO: 43.5 FL (ref 37–54)
EGFRCR SERPLBLD CKD-EPI 2021: 104.1 ML/MIN/1.73
EOSINOPHIL # BLD AUTO: 1.78 10*3/MM3 (ref 0–0.4)
EOSINOPHIL # BLD MANUAL: 1.32 10*3/MM3 (ref 0–0.4)
EOSINOPHIL NFR BLD AUTO: 18.9 % (ref 0.3–6.2)
EOSINOPHIL NFR BLD MANUAL: 14 % (ref 0.3–6.2)
ERYTHROCYTE [DISTWIDTH] IN BLOOD BY AUTOMATED COUNT: 13.7 % (ref 12.3–15.4)
GLOBULIN UR ELPH-MCNC: 3 GM/DL
GLUCOSE SERPL-MCNC: 86 MG/DL (ref 65–99)
HCT VFR BLD AUTO: 40.1 % (ref 34–46.6)
HGB BLD-MCNC: 13.1 G/DL (ref 12–15.9)
IMM GRANULOCYTES # BLD AUTO: 0.14 10*3/MM3 (ref 0–0.05)
IMM GRANULOCYTES NFR BLD AUTO: 1.5 % (ref 0–0.5)
LYMPHOCYTES # BLD AUTO: 2.63 10*3/MM3 (ref 0.7–3.1)
LYMPHOCYTES # BLD MANUAL: 2.45 10*3/MM3 (ref 0.7–3.1)
LYMPHOCYTES NFR BLD AUTO: 27.9 % (ref 19.6–45.3)
LYMPHOCYTES NFR BLD MANUAL: 3 % (ref 5–12)
MCH RBC QN AUTO: 28.2 PG (ref 26.6–33)
MCHC RBC AUTO-ENTMCNC: 32.7 G/DL (ref 31.5–35.7)
MCV RBC AUTO: 86.4 FL (ref 79–97)
MONOCYTES # BLD AUTO: 0.79 10*3/MM3 (ref 0.1–0.9)
MONOCYTES # BLD: 0.28 10*3/MM3 (ref 0.1–0.9)
MONOCYTES NFR BLD AUTO: 8.4 % (ref 5–12)
NEUTROPHILS # BLD AUTO: 5.36 10*3/MM3 (ref 1.7–7)
NEUTROPHILS NFR BLD AUTO: 4.03 10*3/MM3 (ref 1.7–7)
NEUTROPHILS NFR BLD AUTO: 42.9 % (ref 42.7–76)
NEUTROPHILS NFR BLD MANUAL: 50 % (ref 42.7–76)
NEUTS BAND NFR BLD MANUAL: 7 % (ref 0–5)
NRBC BLD AUTO-RTO: 0 /100 WBC (ref 0–0.2)
PLAT MORPH BLD: NORMAL
PLAT MORPH BLD: NORMAL
PLATELET # BLD AUTO: 420 10*3/MM3 (ref 140–450)
PMV BLD AUTO: 8.9 FL (ref 6–12)
POTASSIUM SERPL-SCNC: 3.9 MMOL/L (ref 3.5–5.2)
PROT SERPL-MCNC: 6.7 G/DL (ref 6–8.5)
RBC # BLD AUTO: 4.64 10*6/MM3 (ref 3.77–5.28)
RBC MORPH BLD: NORMAL
RBC MORPH BLD: NORMAL
SODIUM SERPL-SCNC: 138 MMOL/L (ref 136–145)
VARIANT LYMPHS NFR BLD MANUAL: 26 % (ref 19.6–45.3)
WBC MORPH BLD: NORMAL
WBC MORPH BLD: NORMAL
WBC NRBC COR # BLD AUTO: 9.41 10*3/MM3 (ref 3.4–10.8)

## 2025-07-30 PROCEDURE — 36415 COLL VENOUS BLD VENIPUNCTURE: CPT

## 2025-07-30 PROCEDURE — 99213 OFFICE O/P EST LOW 20 MIN: CPT | Performed by: SURGERY

## 2025-07-30 PROCEDURE — 80053 COMPREHEN METABOLIC PANEL: CPT

## 2025-07-30 PROCEDURE — 85007 BL SMEAR W/DIFF WBC COUNT: CPT

## 2025-07-30 PROCEDURE — 85025 COMPLETE CBC W/AUTO DIFF WBC: CPT

## 2025-07-30 RX ORDER — ONDANSETRON 4 MG/1
4 TABLET, FILM COATED ORAL EVERY 8 HOURS PRN
COMMUNITY
Start: 2025-07-23

## 2025-07-31 ENCOUNTER — OFFICE VISIT (OUTPATIENT)
Dept: UROLOGY | Facility: CLINIC | Age: 45
End: 2025-07-31
Payer: COMMERCIAL

## 2025-07-31 ENCOUNTER — HOSPITAL ENCOUNTER (OUTPATIENT)
Dept: CT IMAGING | Facility: HOSPITAL | Age: 45
Discharge: HOME | End: 2025-07-31
Admitting: SURGERY
Payer: COMMERCIAL

## 2025-07-31 VITALS
WEIGHT: 191 LBS | SYSTOLIC BLOOD PRESSURE: 136 MMHG | OXYGEN SATURATION: 100 % | HEIGHT: 66 IN | DIASTOLIC BLOOD PRESSURE: 88 MMHG | BODY MASS INDEX: 30.7 KG/M2 | TEMPERATURE: 98.4 F | HEART RATE: 78 BPM

## 2025-07-31 DIAGNOSIS — K57.92 DIVERTICULITIS: ICD-10-CM

## 2025-07-31 DIAGNOSIS — R31.0 GROSS HEMATURIA: Primary | ICD-10-CM

## 2025-07-31 LAB
BILIRUB BLD-MCNC: NEGATIVE MG/DL
CLARITY, POC: CLEAR
COLOR UR: YELLOW
EXPIRATION DATE: ABNORMAL
GLUCOSE UR STRIP-MCNC: NEGATIVE MG/DL
KETONES UR QL: NEGATIVE
LEUKOCYTE EST, POC: NEGATIVE
Lab: ABNORMAL
NITRITE UR-MCNC: NEGATIVE MG/ML
PH UR: 6 [PH] (ref 5–8)
PROT UR STRIP-MCNC: NEGATIVE MG/DL
RBC # UR STRIP: ABNORMAL /UL
SP GR UR: 1.02 (ref 1–1.03)
UROBILINOGEN UR QL: NORMAL

## 2025-07-31 PROCEDURE — 25510000001 IOPAMIDOL 61 % SOLUTION: Performed by: SURGERY

## 2025-07-31 PROCEDURE — 74177 CT ABD & PELVIS W/CONTRAST: CPT

## 2025-07-31 RX ORDER — IOPAMIDOL 612 MG/ML
100 INJECTION, SOLUTION INTRAVASCULAR
Status: COMPLETED | OUTPATIENT
Start: 2025-07-31 | End: 2025-07-31

## 2025-07-31 RX ADMIN — IOPAMIDOL 100 ML: 612 INJECTION, SOLUTION INTRAVENOUS at 18:50

## 2025-07-31 NOTE — PROGRESS NOTES
"       Office Visit     Patient Name: Vielka Jennings  : 1980   MRN: [id number]     Patient or patient representative verbalized consent for the use of Ambient Listening during the visit with  SHER Pike for chart documentation. 2025  09:03 EDT    Chief Complaint:   Chief Complaint   Patient presents with    Blood in Urine     New patient      Referring Provider: No ref. provider found    Primary Care Provider: Patricio Bass DO     History of Present Illness  The patient presents for evaluation of hematuria.    Hematuria  - She reports hematuria during a hospital visit, attributed to diverticulitis impacting the bladder possibly.  - She is not on anticoagulant therapy.  - She has no history of pelvic radiation, or chemotherapy.  - She is a current smoker and has a history of secondhand smoke exposure   - Patient was in the ER for abdominal pain diagnosed with diverticulitis and admitted to the hospital, during this time she also noted the presence of blood clots in her urine.  She reports she saw  blood clots in the urine for 7 days and bleeding stopped as of yesterday.  Urine microscopy while in hospital was negative for bacteria, therefore no urine culture was performed.  -Denies recurrent urinary tract infections  - CT was negative for kidney stones      Subjective   Review of System:   As noted in HPI.    Past Medical History:   Diagnosis Date    Abnormal Pap smear of cervix     Acid reflux     Anemia     Body piercing     EARS    Bronchitis 10/03/2022    has residual cough and nasal congestion (assessed 11/3/22)    Diverticulitis     Endometriosis     Heart murmur     REPORTS \"MURMUR AS A BABY\"    History of cervical dysplasia     History of gastroesophageal reflux (GERD)     HPV (human papilloma virus) infection     Hypertension     IBS (irritable bowel syndrome)     REPORTS PAST HISTORY, NO CURRENT ISSUES    Irritable bowel syndrome with diarrhea 2016    Migraine  " "   Port-wine stain     Seasonal allergies     Varicella      Past Surgical History:   Procedure Laterality Date    ANKLE SURGERY Left     REPORTS LIGAMENT REPAIR, THEN LATER HAD STITCHES REMOVED (1 YEAR POST OP)    CERVICAL BIOPSY  W/ LOOP ELECTRODE EXCISION      CHOLECYSTECTOMY      COLONOSCOPY N/A 11/04/2022    Procedure: COLONOSCOPY WITH POLYPECTOMY X3;  Surgeon: Tico Manzano MD;  Location: [facility] ENDOSCOPY;  Service: Gastroenterology;  Laterality: N/A;    D & C HYSTEROSCOPY N/A 04/03/2023    Procedure: DILATATION AND CURETTAGE HYSTEROSCOPY;  Surgeon: Candy Knight MD;  Location: [facility] OR;  Service: Obstetrics/Gynecology;  Laterality: N/A;    DILATATION AND CURETTAGE      ENDOMETRIAL ABLATION      ENDOSCOPY      EXPLORATORY LAPAROTOMY      FOOT SURGERY      surgery to repair torn ligaments in left ankle in 2010/2011    GYNECOLOGIC CRYOSURGERY      HYSTEROSCOPY      HYSTEROSCOPY ENDOMETRIAL ABLATION      LAPAROSCOPIC CHOLECYSTECTOMY  2006    LEEP N/A 08/24/2018    Procedure: LOOP ELECTROCAUTERY EXCISION PROCEDURE;  Surgeon: Candy Knight MD;  Location: [facility] OR;  Service: Obstetrics/Gynecology    OTHER SURGICAL HISTORY      AFTER DELIVERY OF DAUGHTER REPORTS SURGICAL INTERVENTION FROM \"TEAR THAT WAS INTERNAL\"    SKIN BIOPSY      benign    TRIGGER POINT INJECTION       Family History   Problem Relation Age of Onset    Cancer Mother         Skin Cancer    Hyperlipidemia Mother     Melanoma Mother     Hyperlipidemia Father     Hypertension Father     Hyperlipidemia Brother     Hypertension Brother     Hyperlipidemia Maternal Grandmother     Heart attack Paternal Grandmother     Hyperlipidemia Paternal Grandmother     Thyroid disease Maternal Aunt     Hyperlipidemia Maternal Grandfather     Heart attack Paternal Grandfather     Hyperlipidemia Paternal Grandfather     Breast cancer Neg Hx      Social History     Socioeconomic History    Marital status:    Tobacco Use    Smoking status: Every Day     " "Current packs/day: 0.50     Average packs/day: 0.5 packs/day for 25.8 years (12.9 ttl pk-yrs)     Types: Cigarettes     Start date: 1/1/2000     Passive exposure: Current    Smokeless tobacco: Never   Vaping Use    Vaping status: Never Used   Substance and Sexual Activity    Alcohol use: No    Drug use: No    Sexual activity: Not Currently     Partners: Male     Birth control/protection: Birth control pill       Current Outpatient Medications:     dexlansoprazole (Dexilant) 30 MG capsule, Take 1 capsule by mouth Daily., Disp: 90 capsule, Rfl: 3    nebivolol (BYSTOLIC) 10 MG tablet, Take 1 tablet by mouth Daily., Disp: 90 tablet, Rfl: 3    norethindrone (MICRONOR) 0.35 MG tablet, Take 1 tablet by mouth Daily., Disp: 28 tablet, Rfl: 3    ondansetron (ZOFRAN) 4 MG tablet, Take 1 tablet by mouth Every 8 (Eight) Hours As Needed. for nausea, Disp: , Rfl:     HYDROcodone-acetaminophen (NORCO) 5-325 MG per tablet, Take 1 tablet by mouth Every 6 (Six) Hours As Needed for Severe Pain. (Patient not taking: Reported on 7/31/2025), Disp: 8 tablet, Rfl: 0    Allergies   Allergen Reactions    Gadolinium Derivatives (Mr Contrast) Itching     Immediately upon receiving 17ml of multihance, patient experienced itching of the throat.  No other symptoms were present.    Levaquin [Levofloxacin] Hives    Sulfa Antibiotics Hives    Zithromax [Azithromycin] Diarrhea     Objective   Visit Vitals  /88 (BP Location: Right arm, Patient Position: Sitting, Cuff Size: Adult)   Pulse 78   Temp 98.4 °F (36.9 °C) (Temporal)   Ht 167.6 cm (65.98\")   Wt 86.6 kg (191 lb)   LMP 07/09/2025 (Approximate)   SpO2 100%   BMI 30.84 kg/m²        Body mass index is 30.84 kg/m².     Physical Exam  Vitals and nursing note reviewed.   Constitutional:       General: She is not in acute distress.     Appearance: Normal appearance. She is overweight. She is not ill-appearing.   Pulmonary:      Effort: Pulmonary effort is normal. No respiratory distress. " "  Skin:     General: Skin is warm and dry.   Neurological:      General: No focal deficit present.      Mental Status: She is alert and oriented to person, place, and time.   Psychiatric:         Mood and Affect: Mood normal.         Behavior: Behavior normal.          Labs  Lab Results   Component Value Date    COLORU Yellow 07/31/2025    CLARITYU Clear 07/31/2025    SPECGRAV 1.025 07/31/2025    PHUR 6.0 07/31/2025    LEUKOCYTESUR Negative 07/31/2025    NITRITE Negative 07/31/2025    PROTEINPOCUA Negative 07/31/2025    GLUCOSEUR Negative 07/31/2025    KETONESU Negative 07/31/2025    UROBILINOGEN Normal 07/31/2025    BILIRUBINUR Negative 07/31/2025    RBCUR Large (A) 07/31/2025      Lab Results   Component Value Date    WBCUA None Seen 07/23/2025    WBCUA 0-2 (A) 03/24/2023    RBCUA 0-2 07/23/2025    RBCUA 0-2 (A) 03/24/2023    BACTERIA None Seen 07/23/2025    BACTERIA Trace (A) 03/24/2023    HYALCASTU None Seen 07/23/2025    HYALCASTU None Seen 03/24/2023    SQUAMEPIUA 0-2 07/23/2025    SQUAMEPIUA 3-6 (A) 03/24/2023        Lab Results   Component Value Date    WBC 9.41 07/30/2025    HGB 13.1 07/30/2025    HCT 40.1 07/30/2025    MCV 86.4 07/30/2025     07/30/2025     Lab Results   Component Value Date    GLUCOSE 86 07/30/2025    CALCIUM 9.4 07/30/2025     07/30/2025    K 3.9 07/30/2025    CO2 23.1 07/30/2025     07/30/2025    BUN 9.0 07/30/2025    BUN 9.0 07/25/2025    CREATININE 0.73 07/30/2025    CREATININE 0.60 07/25/2025    EGFR 104.1 07/30/2025    EGFR 113.7 07/25/2025    BCR 12.3 07/30/2025    ANIONGAP 8.9 07/30/2025    ALT 26 07/30/2025    AST 25 07/30/2025     No results found for: \"HGBA1C\"  No results found for: \"URICACIDSTN\", \"KUOK8OXHDQX\", \"TOQY9MZIBV\", \"LABMAGN\"  Lab Results   Component Value Date    HRWC02WX 27.9 (L) 08/19/2022    URICACID 3.9 01/16/2025     Lab Results   Component Value Date    LABPH 6.0 09/15/2022       No results found for: \"ATOPOBIUMV\", \"BVAB2\", \"MEGASPHAER\", " "\"CALBICANSN\", \"CGLABRATAN\", \"CPARAPSILOS\", \"CLUSITANIAE\", \"CKRUSEI\", \"TRICHVAG\", \"CHLAMNAA\", \"NGONORRHON\", \"UREAPLASMA\", \"MYCOPLASMAG\"    Lab Results   Component Value Date    TSH 1.460 06/21/2024    FREET4 1.41 06/21/2024    WXSMFPYP28 315 08/19/2022    ZDKH21OS 27.9 (L) 08/19/2022         Radiographic Studies  CT Abdomen Pelvis With Contrast  Result Date: 7/23/2025  Acute mid sigmoid diverticulitis with a small amount of pelvic ascites and what appears to be a contained for perforation posteriorly and laterally; recommend follow-up to document resolution.  Bowel gas pattern suggesting ileus.  Enlarged, fatty liver.    CTDI: 10.02 mGy DLP:501.19 mGy.cm  This report was signed and finalized on 7/23/2025 5:06 PM by Lula Fitch MD.        I have reviewed the above labs and imaging.     Assessment / Plan      Diagnoses and all orders for this visit:    1. Gross hematuria (Primary)  -     POC Urinalysis Dipstick, Automated         Assessment & Plan  1.  Gross hematuria  - Visible hematuria reported for 7 days  - Recommended diagnostic workup includes cystoscopy and CT urogram to rule out bladder lesions or ulcers (called radiology and they will add delayed phase imaging to CT scan ordered by Dr. Oakley)  - Cystoscopy to be performed in-office with lidocaine jelly, taking 30 seconds to a minute, potentially causing discomfort similar to catheterization  - Schedule cystoscopy with Dr. Ramon       Return for Dr. Ramon cystoscopy.    I spent a total of 30 minutes with the patient and the chart engaging in data gathering and interpretation, patient interaction, as well as counseling on the risks, benefits, and alternatives of the therapy and coordinating care.    Gracy Marquez, MSN, APRN, FNP-C  [facility]    "

## 2025-08-04 ENCOUNTER — PROCEDURE VISIT (OUTPATIENT)
Dept: UROLOGY | Facility: CLINIC | Age: 45
End: 2025-08-04
Payer: COMMERCIAL

## 2025-08-04 ENCOUNTER — OFFICE VISIT (OUTPATIENT)
Dept: SURGERY | Facility: CLINIC | Age: 45
End: 2025-08-04
Payer: COMMERCIAL

## 2025-08-04 ENCOUNTER — TELEPHONE (OUTPATIENT)
Age: 45
End: 2025-08-04
Payer: COMMERCIAL

## 2025-08-04 VITALS
DIASTOLIC BLOOD PRESSURE: 70 MMHG | TEMPERATURE: 97.5 F | SYSTOLIC BLOOD PRESSURE: 118 MMHG | HEART RATE: 67 BPM | OXYGEN SATURATION: 98 % | WEIGHT: 192 LBS | BODY MASS INDEX: 30.86 KG/M2 | HEIGHT: 66 IN

## 2025-08-04 DIAGNOSIS — R31.0 GROSS HEMATURIA: Primary | ICD-10-CM

## 2025-08-04 DIAGNOSIS — K57.92 DIVERTICULITIS: Primary | ICD-10-CM

## 2025-08-04 PROCEDURE — 52000 CYSTOURETHROSCOPY: CPT | Performed by: UROLOGY

## 2025-08-04 RX ORDER — FLUCONAZOLE 150 MG/1
150 TABLET ORAL ONCE
Qty: 1 TABLET | Refills: 0 | Status: SHIPPED | OUTPATIENT
Start: 2025-08-04 | End: 2025-08-04

## 2025-08-05 ENCOUNTER — OFFICE VISIT (OUTPATIENT)
Age: 45
End: 2025-08-05
Payer: COMMERCIAL

## 2025-08-05 VITALS
BODY MASS INDEX: 31.18 KG/M2 | HEART RATE: 71 BPM | DIASTOLIC BLOOD PRESSURE: 94 MMHG | OXYGEN SATURATION: 96 % | SYSTOLIC BLOOD PRESSURE: 134 MMHG | HEIGHT: 66 IN | WEIGHT: 194 LBS

## 2025-08-05 DIAGNOSIS — K57.20 DIVERTICULITIS OF LARGE INTESTINE WITH PERFORATION WITHOUT BLEEDING: Primary | ICD-10-CM

## 2025-08-05 RX ORDER — FLUCONAZOLE 100 MG/1
100 TABLET ORAL DAILY
Qty: 5 TABLET | Refills: 0 | Status: SHIPPED | OUTPATIENT
Start: 2025-08-05 | End: 2025-08-10

## 2025-08-15 ENCOUNTER — OFFICE VISIT (OUTPATIENT)
Dept: OBSTETRICS AND GYNECOLOGY | Facility: CLINIC | Age: 45
End: 2025-08-15
Payer: COMMERCIAL

## 2025-08-15 VITALS
HEIGHT: 66 IN | WEIGHT: 193 LBS | DIASTOLIC BLOOD PRESSURE: 76 MMHG | SYSTOLIC BLOOD PRESSURE: 128 MMHG | BODY MASS INDEX: 31.02 KG/M2

## 2025-08-15 DIAGNOSIS — Z01.411 ENCOUNTER FOR GYNECOLOGICAL EXAMINATION (GENERAL) (ROUTINE) WITH ABNORMAL FINDINGS: Primary | ICD-10-CM

## 2025-08-15 DIAGNOSIS — N92.6 IRREGULAR MENSES: ICD-10-CM

## 2025-08-15 DIAGNOSIS — Z30.011 ENCOUNTER FOR PRESCRIPTION OF ORAL CONTRACEPTIVES: ICD-10-CM

## 2025-08-15 DIAGNOSIS — Z12.31 ENCOUNTER FOR SCREENING MAMMOGRAM FOR BREAST CANCER: ICD-10-CM

## 2025-08-15 RX ORDER — ACETAMINOPHEN AND CODEINE PHOSPHATE 120; 12 MG/5ML; MG/5ML
1 SOLUTION ORAL DAILY
Qty: 84 TABLET | Refills: 3 | Status: SHIPPED | OUTPATIENT
Start: 2025-08-15 | End: 2026-08-15

## 2025-08-18 LAB — REF LAB TEST METHOD: NORMAL

## 2025-08-21 ENCOUNTER — OFFICE VISIT (OUTPATIENT)
Dept: SURGERY | Facility: CLINIC | Age: 45
End: 2025-08-21
Payer: COMMERCIAL

## 2025-08-21 VITALS
WEIGHT: 190 LBS | HEIGHT: 66 IN | HEART RATE: 62 BPM | DIASTOLIC BLOOD PRESSURE: 82 MMHG | TEMPERATURE: 97.5 F | BODY MASS INDEX: 30.53 KG/M2 | OXYGEN SATURATION: 98 % | SYSTOLIC BLOOD PRESSURE: 128 MMHG

## 2025-08-21 DIAGNOSIS — K57.92 DIVERTICULITIS: Primary | ICD-10-CM

## 2025-08-21 PROCEDURE — 99213 OFFICE O/P EST LOW 20 MIN: CPT | Performed by: SURGERY

## 2025-08-22 DIAGNOSIS — Z12.11 COLON CANCER SCREENING: Primary | ICD-10-CM

## 2025-08-22 RX ORDER — POLYETHYLENE GLYCOL 3350 17 G/17G
238 POWDER, FOR SOLUTION ORAL DAILY
Qty: 238 G | Refills: 0 | Status: SHIPPED | OUTPATIENT
Start: 2025-08-22 | End: 2025-08-23

## 2025-08-22 RX ORDER — BISACODYL 5 MG
TABLET, DELAYED RELEASE (ENTERIC COATED) ORAL
Qty: 4 TABLET | Refills: 0 | Status: SHIPPED | OUTPATIENT
Start: 2025-08-22

## (undated) DEVICE — QUICK CATCH IN-LINE SUCTION POLYP TRAP IS USED FOR SUCTION RETRIEVAL OF ENDOSCOPICALLY REMOVED POLYPS.

## (undated) DEVICE — ELECTRD DIATHERMY BALL 5MM 11CM

## (undated) DEVICE — SOL NACL 0.9PCT 1000ML

## (undated) DEVICE — RICH MINOR LITHOTOMY: Brand: MEDLINE INDUSTRIES, INC.

## (undated) DEVICE — VLV SXN AIR/H2O ORCAPOD3 1P/U STRL

## (undated) DEVICE — HYBRID TUBING/CAP SET FOR OLYMPUS® SCOPES: Brand: ERBE

## (undated) DEVICE — PAD SANI MAXI W/ADHS SNG WRP 11IN

## (undated) DEVICE — GOWN,PREVENTION PLUS,XLARGE,STERILE: Brand: MEDLINE

## (undated) DEVICE — SINGLE-USE POLYPECTOMY SNARE: Brand: CAPTIVATOR II

## (undated) DEVICE — GLV SURG BIOGEL M LTX PF 6 1/2

## (undated) DEVICE — Device

## (undated) DEVICE — FLEXIBLE YANKAUER,MEDIUM TIP, NO VACUUM CONTROL: Brand: ARGYLE

## (undated) DEVICE — COUNT NDL FOAM STRIP W/MAG 60CT

## (undated) DEVICE — SOL IRR H2O BTL 1000ML STRL

## (undated) DEVICE — TUBING, SUCTION, 1/4" X 12', STRAIGHT: Brand: MEDLINE

## (undated) DEVICE — INTENDED FOR TISSUE SEPARATION, AND OTHER PROCEDURES THAT REQUIRE A SHARP SURGICAL BLADE TO PUNCTURE OR CUT.: Brand: BARD-PARKER ® CARBON RIB-BACK BLADES

## (undated) DEVICE — GOWN,SIRUS,NON REINFRCD,LARGE,SET IN SL: Brand: MEDLINE

## (undated) DEVICE — PENCL E/S HNDSWCH PUSHBTN HOLSTR 10FT

## (undated) DEVICE — ENDOSCOPY PORT CONNECTOR FOR OLYMPUS® SCOPES: Brand: ERBE

## (undated) DEVICE — ST IRR CYSTO W/SPK 77IN LF

## (undated) DEVICE — SUT GUT CHRM 2/0 SH 27IN G123H

## (undated) DEVICE — ELECTRD SQ LOOP 10X8MM 5IN STRL

## (undated) DEVICE — PTFE COATED BLADE 2.5': Brand: MEDLINE